# Patient Record
Sex: FEMALE | NOT HISPANIC OR LATINO | Employment: FULL TIME | URBAN - METROPOLITAN AREA
[De-identification: names, ages, dates, MRNs, and addresses within clinical notes are randomized per-mention and may not be internally consistent; named-entity substitution may affect disease eponyms.]

---

## 2017-01-05 ENCOUNTER — TELEPHONE (OUTPATIENT)
Dept: FAMILY MEDICINE | Facility: OTHER | Age: 27
End: 2017-01-05

## 2017-01-05 NOTE — TELEPHONE ENCOUNTER
Summary:    Patient is due/failing the following:   PAP and PHQ9    Action needed:   Patient needs office visit for PAP. and Patient needs to do PHQ9.    Type of outreach:    Phone, left message for patient to call back.     Questions for provider review:    None                                                                                                                                    Victorina Cuellar       Chart routed to Care Team .        Panel Management Review      Patient has the following on her problem list: None      Composite cancer screening  Chart review shows that this patient is due/due soon for the following Pap Smear

## 2017-01-05 NOTE — Clinical Note
[facility]  [address]   [facility] [address]  Phone: [phone]  January 6, 2017      Vin Branham  [address]  [place] [address]      Dear Vin,    We care about your health and have reviewed your health plan including your medical conditions, medications, and lab results.  Based on this review, it is recommended that you follow up regarding the following health topic(s):  -Cervical Cancer Screening    We recommend you take the following action(s):  -schedule a PAP SMEAR EXAM which is due.  Please disregard this reminder if you have had this exam elsewhere within the last year.  It would be helpful for us to have a copy of your recent pap smear report to update your records.     Please call us at the [facility] - [phone] (or use Yippy) to address the above recommendations.     Thank you for trusting [facility] and we appreciate the opportunity to serve you.  We look forward to supporting your healthcare needs in the future.    Healthy Regards,    Your Health Care Team  [facility]
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
## 2018-07-06 NOTE — PATIENT INSTRUCTIONS
Preventive Health Recommendations  Female Ages 26 - 39  Yearly exam:   See your health care provider every year in order to    Review health changes.     Discuss preventive care.      Review your medicines if you your doctor has prescribed any.    Until age 30: Get a Pap test every three years (more often if you have had an abnormal result).    After age 30: Talk to your doctor about whether you should have a Pap test every 3 years or have a Pap test with HPV screening every 5 years.   You do not need a Pap test if your uterus was removed (hysterectomy) and you have not had cancer.  You should be tested each year for STDs (sexually transmitted diseases), if you're at risk.   Talk to your provider about how often to have your cholesterol checked.  If you are at risk for diabetes, you should have a diabetes test (fasting glucose).  Shots: Get a flu shot each year. Get a tetanus shot every 10 years.   Nutrition:     Eat at least 5 servings of fruits and vegetables each day.    Eat whole-grain bread, whole-wheat pasta and brown rice instead of white grains and rice.    Get adequate Calcium and Vitamin D.     Lifestyle    Exercise at least 150 minutes a week (30 minutes a day, 5 days of the week). This will help you control your weight and prevent disease.    Limit alcohol to one drink per day.    No smoking.     Wear sunscreen to prevent skin cancer.    See your dentist every six months for an exam and cleaning.        Will get you set up with OB/gyn to further discuss your chronic pelvic pain with history of endometriosis.    Will order labs today including cholesterol. If anything comes back abnormal, will order genetic testing.    Follow up annually.

## 2018-07-11 ENCOUNTER — OFFICE VISIT (OUTPATIENT)
Dept: FAMILY MEDICINE | Facility: OTHER | Age: 28
End: 2018-07-11
Payer: COMMERCIAL

## 2018-07-11 VITALS
TEMPERATURE: 98.8 F | RESPIRATION RATE: 16 BRPM | WEIGHT: 157 LBS | BODY MASS INDEX: 24.64 KG/M2 | DIASTOLIC BLOOD PRESSURE: 64 MMHG | OXYGEN SATURATION: 100 % | SYSTOLIC BLOOD PRESSURE: 110 MMHG | HEIGHT: 67 IN | HEART RATE: 89 BPM

## 2018-07-11 DIAGNOSIS — N94.6 DYSMENORRHEA: ICD-10-CM

## 2018-07-11 DIAGNOSIS — Z00.00 ENCOUNTER FOR ROUTINE ADULT HEALTH EXAMINATION WITHOUT ABNORMAL FINDINGS: Primary | ICD-10-CM

## 2018-07-11 DIAGNOSIS — Z11.4 SCREENING FOR HIV (HUMAN IMMUNODEFICIENCY VIRUS): ICD-10-CM

## 2018-07-11 DIAGNOSIS — Z83.42 FAMILY HISTORY OF FAMILIAL HYPERCHOLESTEROLEMIA: ICD-10-CM

## 2018-07-11 DIAGNOSIS — Z13.6 CARDIOVASCULAR SCREENING; LDL GOAL LESS THAN 160: ICD-10-CM

## 2018-07-11 DIAGNOSIS — R10.2 CHRONIC PELVIC PAIN IN FEMALE: ICD-10-CM

## 2018-07-11 DIAGNOSIS — G89.29 CHRONIC PELVIC PAIN IN FEMALE: ICD-10-CM

## 2018-07-11 PROBLEM — Z23 NEED FOR PROPHYLACTIC VACCINATION WITH TETANUS-DIPHTHERIA (TD): Status: ACTIVE | Noted: 2018-07-11

## 2018-07-11 LAB
ANION GAP SERPL CALCULATED.3IONS-SCNC: 8 MMOL/L (ref 3–14)
BUN SERPL-MCNC: 13 MG/DL (ref 7–30)
CALCIUM SERPL-MCNC: 9.4 MG/DL (ref 8.5–10.1)
CHLORIDE SERPL-SCNC: 103 MMOL/L (ref 94–109)
CHOLEST SERPL-MCNC: 244 MG/DL
CO2 SERPL-SCNC: 27 MMOL/L (ref 20–32)
CREAT SERPL-MCNC: 0.84 MG/DL (ref 0.52–1.04)
GFR SERPL CREATININE-BSD FRML MDRD: 80 ML/MIN/1.7M2
GLUCOSE SERPL-MCNC: 88 MG/DL (ref 70–99)
HDLC SERPL-MCNC: 74 MG/DL
LDLC SERPL CALC-MCNC: 153 MG/DL
NONHDLC SERPL-MCNC: 170 MG/DL
POTASSIUM SERPL-SCNC: 4 MMOL/L (ref 3.4–5.3)
SODIUM SERPL-SCNC: 138 MMOL/L (ref 133–144)
TRIGL SERPL-MCNC: 84 MG/DL

## 2018-07-11 PROCEDURE — 99395 PREV VISIT EST AGE 18-39: CPT | Performed by: PHYSICIAN ASSISTANT

## 2018-07-11 PROCEDURE — 80061 LIPID PANEL: CPT | Performed by: PHYSICIAN ASSISTANT

## 2018-07-11 PROCEDURE — 99213 OFFICE O/P EST LOW 20 MIN: CPT | Mod: 25 | Performed by: PHYSICIAN ASSISTANT

## 2018-07-11 PROCEDURE — 87389 HIV-1 AG W/HIV-1&-2 AB AG IA: CPT | Performed by: PHYSICIAN ASSISTANT

## 2018-07-11 PROCEDURE — 36415 COLL VENOUS BLD VENIPUNCTURE: CPT | Performed by: PHYSICIAN ASSISTANT

## 2018-07-11 PROCEDURE — 80048 BASIC METABOLIC PNL TOTAL CA: CPT | Performed by: PHYSICIAN ASSISTANT

## 2018-07-11 RX ORDER — SPIRONOLACTONE 100 MG/1
100 TABLET, FILM COATED ORAL
COMMUNITY
Start: 2018-06-20 | End: 2019-08-26

## 2018-07-11 ASSESSMENT — ENCOUNTER SYMPTOMS
ABDOMINAL PAIN: 0
HEARTBURN: 0
CONSTIPATION: 0
DIARRHEA: 0
FREQUENCY: 1
MYALGIAS: 0
COUGH: 0
HEADACHES: 0
PALPITATIONS: 0
DIZZINESS: 0
NERVOUS/ANXIOUS: 1
BREAST MASS: 0
FEVER: 0
SHORTNESS OF BREATH: 0
HEMATOCHEZIA: 0
PARESTHESIAS: 0
ARTHRALGIAS: 0
SORE THROAT: 0
NAUSEA: 0
JOINT SWELLING: 0
CHILLS: 1
DYSURIA: 0
EYE PAIN: 0
HEMATURIA: 0
WEAKNESS: 0

## 2018-07-11 ASSESSMENT — PATIENT HEALTH QUESTIONNAIRE - PHQ9
10. IF YOU CHECKED OFF ANY PROBLEMS, HOW DIFFICULT HAVE THESE PROBLEMS MADE IT FOR YOU TO DO YOUR WORK, TAKE CARE OF THINGS AT HOME, OR GET ALONG WITH OTHER PEOPLE: NOT DIFFICULT AT ALL
SUM OF ALL RESPONSES TO PHQ QUESTIONS 1-9: 5
SUM OF ALL RESPONSES TO PHQ QUESTIONS 1-9: 5

## 2018-07-11 ASSESSMENT — PAIN SCALES - GENERAL: PAINLEVEL: NO PAIN (0)

## 2018-07-11 ASSESSMENT — ANXIETY QUESTIONNAIRES
1. FEELING NERVOUS, ANXIOUS, OR ON EDGE: SEVERAL DAYS
3. WORRYING TOO MUCH ABOUT DIFFERENT THINGS: SEVERAL DAYS
5. BEING SO RESTLESS THAT IT IS HARD TO SIT STILL: SEVERAL DAYS
7. FEELING AFRAID AS IF SOMETHING AWFUL MIGHT HAPPEN: SEVERAL DAYS
2. NOT BEING ABLE TO STOP OR CONTROL WORRYING: SEVERAL DAYS
4. TROUBLE RELAXING: SEVERAL DAYS
GAD7 TOTAL SCORE: 7
6. BECOMING EASILY ANNOYED OR IRRITABLE: SEVERAL DAYS
7. FEELING AFRAID AS IF SOMETHING AWFUL MIGHT HAPPEN: SEVERAL DAYS
GAD7 TOTAL SCORE: 7
GAD7 TOTAL SCORE: 7

## 2018-07-11 NOTE — MR AVS SNAPSHOT
After Visit Summary   7/11/2018    Vin Branham    MRN: [id number]           Patient Information     Date Of Birth          1990        Visit Information        Provider Department      7/11/2018 7:30 AM Daniele Garcia PA-C [facility]        Today's Diagnoses     Encounter for routine adult health examination without abnormal findings    -  1    Screening for HIV (human immunodeficiency virus)        Chronic pelvic pain in female        Endometriosis of pelvis        Family history of familial hypercholesterolemia        CARDIOVASCULAR SCREENING; LDL GOAL LESS THAN 160        Need for prophylactic vaccination with tetanus-diphtheria (TD)          Care Instructions      Preventive Health Recommendations  Female Ages 26 - 39  Yearly exam:   See your health care provider every year in order to    Review health changes.     Discuss preventive care.      Review your medicines if you your doctor has prescribed any.    Until age 30: Get a Pap test every three years (more often if you have had an abnormal result).    After age 30: Talk to your doctor about whether you should have a Pap test every 3 years or have a Pap test with HPV screening every 5 years.   You do not need a Pap test if your uterus was removed (hysterectomy) and you have not had cancer.  You should be tested each year for STDs (sexually transmitted diseases), if you're at risk.   Talk to your provider about how often to have your cholesterol checked.  If you are at risk for diabetes, you should have a diabetes test (fasting glucose).  Shots: Get a flu shot each year. Get a tetanus shot every 10 years.   Nutrition:     Eat at least 5 servings of fruits and vegetables each day.    Eat whole-grain bread, whole-wheat pasta and brown rice instead of white grains and rice.    Get adequate Calcium and Vitamin D.     Lifestyle    Exercise at least 150 minutes a week (30 minutes a day, 5 days of the week). This will  help you control your weight and prevent disease.    Limit alcohol to one drink per day.    No smoking.     Wear sunscreen to prevent skin cancer.    See your dentist every six months for an exam and cleaning.        Will get you set up with OB/gyn to further discuss your chronic pelvic pain with history of endometriosis.    Will order labs today including cholesterol. If anything comes back abnormal, will order genetic testing.    Follow up annually.                  Follow-ups after your visit        Additional Services     OB/GYN REFERRAL       Your provider has referred you to:  [facility]: [facility] [place] [phone]   [email or web address]    Please be aware that coverage of these services is subject to the terms and limitations of your health insurance plan.  Call member services at your health plan with any benefit or coverage questions.      Please bring the following with you to your appointment:    (1) Any X-Rays, CTs or MRIs which have been performed.  Contact the facility where they were done to arrange for  prior to your scheduled appointment.   (2) List of current medications   (3) This referral request   (4) Any documents/labs given to you for this referral                  Follow-up notes from your care team     Return in about 1 year (around 7/11/2019) for Routine Visit, Lab Work, Physical Exam.      Your next 10 appointments already scheduled     Aug 09, 2018  9:30 AM CDT   [facility]    [address] [address] [phone]           Bring a current list of meds and any records pertaining to this visit. For Physicals, please bring immunization records and any forms needing to be filled out. Please arrive 10 minutes early to complete paperwork.              Who to contact     If you have questions or need follow up information about today's clinic  "visit or your schedule please contact [facility] directly at [phone].  Normal or non-critical lab and imaging results will be communicated to you by MyChart, letter or phone within 4 business days after the clinic has received the results. If you do not hear from us within 7 days, please contact the clinic through MyChart or phone. If you have a critical or abnormal lab result, we will notify you by phone as soon as possible.  Submit refill requests through [employer] or call your pharmacy and they will forward the refill request to us. Please allow 3 business days for your refill to be completed.          Additional Information About Your Visit        Care EveryWhere ID     This is your Care EveryWhere ID. This could be used by other organizations to access your [place] medical records  [id number]        Your Vitals Were     Pulse Temperature Respirations Height Last Period Pulse Oximetry    89 98.8  F (37.1  C) (Temporal) 16 5' 7\" (1.702 m) (LMP Unknown) 100%    BMI (Body Mass Index)                   24.59 kg/m2            Blood Pressure from Last 3 Encounters:   07/11/18 110/64   02/01/16 108/70   04/03/15 118/82    Weight from Last 3 Encounters:   07/11/18 157 lb (71.2 kg)   02/01/16 198 lb (89.8 kg)   04/03/15 199 lb (90.3 kg)              We Performed the Following     Basic metabolic panel  (Ca, Cl, CO2, Creat, Gluc, K, Na, BUN)     HIV Screening     Lipid panel reflex to direct LDL Fasting     OB/GYN REFERRAL     TDAP, IM (10 - 64 YRS) - Adacel        Primary Care Provider Office Phone # Fax #    [facility] [phone]       [address] [address]        Equal Access to Services     CHENTE MCCLURE AH: Hadii alex Rivera, waanjumda luqadaha, qaybta kaalmada adeegyada, gill damian. So [facility] [phone].    ATENCIÓN: Si habla español, tiene a nunez disposición servicios gratuitos de asistencia lingüística. Llame al " [phone].    We comply with applicable federal civil rights laws and [place] laws. We do not discriminate on the basis of race, color, national origin, age, disability, sex, sexual orientation, or gender identity.            Thank you!     Thank you for choosing [facility]  for your care. Our goal is always to provide you with excellent care. Hearing back from our patients is one way we can continue to improve our services. Please take a few minutes to complete the written survey that you may receive in the mail after your visit with us. Thank you!             Your Updated Medication List - Protect others around you: Learn how to safely use, store and throw away your medicines at www.disposemymeds.org.          This list is accurate as of 7/11/18  8:00 AM.  Always use your most recent med list.                   Brand Name Dispense Instructions for use Diagnosis    ibuprofen 600 MG tablet    ADVIL/MOTRIN    90 tablet    Take 1 tablet by mouth every 6 hours as needed for pain.    Ovarian cysts, Chronic pelvic pain in female, Endometriosis of pelvis       ondansetron 4 MG ODT tab    ZOFRAN ODT    20 tablet    Take 1-2 tablets by mouth every 8 hours as needed for nausea.    Nausea, Abdominal pain, Diarrhea       oxyCODONE-acetaminophen 5-325 MG per tablet    PERCOCET          spironolactone 100 MG tablet    ALDACTONE     100 mg        TYLENOL PO      Take  by mouth.

## 2018-07-11 NOTE — NURSING NOTE
"Chief Complaint   Patient presents with     Physical     Panel Management     mychart, tdap, height, pap, hiv screen , phq/radha, urine drug screen        Initial /64  Pulse 89  Temp 98.8  F (37.1  C) (Temporal)  Resp 16  Ht 5' 7\" (1.702 m)  Wt 157 lb (71.2 kg)  LMP  (LMP Unknown)  SpO2 100%  BMI 24.59 kg/m2 Estimated body mass index is 24.59 kg/(m^2) as calculated from the following:    Height as of this encounter: 5' 7\" (1.702 m).    Weight as of this encounter: 157 lb (71.2 kg).  Medication Reconciliation: complete    Tierney Gee CMA      "

## 2018-07-12 LAB — HIV 1+2 AB+HIV1 P24 AG SERPL QL IA: NONREACTIVE

## 2018-07-12 ASSESSMENT — PATIENT HEALTH QUESTIONNAIRE - PHQ9: SUM OF ALL RESPONSES TO PHQ QUESTIONS 1-9: 5

## 2018-07-12 ASSESSMENT — ANXIETY QUESTIONNAIRES: GAD7 TOTAL SCORE: 7

## 2018-07-23 ENCOUNTER — TELEPHONE (OUTPATIENT)
Dept: FAMILY MEDICINE | Facility: OTHER | Age: 28
End: 2018-07-23

## 2018-07-23 ENCOUNTER — ALLIED HEALTH/NURSE VISIT (OUTPATIENT)
Dept: FAMILY MEDICINE | Facility: OTHER | Age: 28
End: 2018-07-23
Payer: COMMERCIAL

## 2018-07-23 DIAGNOSIS — E78.00 PURE HYPERCHOLESTEROLEMIA: ICD-10-CM

## 2018-07-23 DIAGNOSIS — Z12.4 SCREENING FOR MALIGNANT NEOPLASM OF CERVIX: ICD-10-CM

## 2018-07-23 DIAGNOSIS — Z83.42 FAMILY HISTORY OF FAMILIAL HYPERCHOLESTEROLEMIA: ICD-10-CM

## 2018-07-23 DIAGNOSIS — Z23 NEED FOR PROPHYLACTIC VACCINATION WITH TETANUS-DIPHTHERIA (TD): ICD-10-CM

## 2018-07-23 DIAGNOSIS — Z23 NEED FOR TDAP VACCINATION: ICD-10-CM

## 2018-07-23 DIAGNOSIS — Z83.42 FAMILY HISTORY OF FAMILIAL HYPERCHOLESTEROLEMIA: Primary | ICD-10-CM

## 2018-07-23 PROCEDURE — 90471 IMMUNIZATION ADMIN: CPT

## 2018-07-23 PROCEDURE — 40000803 ZZHCL STATISTIC DNA ISOL HIGH PURITY: Performed by: PHYSICIAN ASSISTANT

## 2018-07-23 PROCEDURE — 36415 COLL VENOUS BLD VENIPUNCTURE: CPT | Performed by: PHYSICIAN ASSISTANT

## 2018-07-23 PROCEDURE — 99207 ZZC NO CHARGE LOS: CPT

## 2018-07-23 PROCEDURE — 90715 TDAP VACCINE 7 YRS/> IM: CPT

## 2018-07-23 PROCEDURE — 81479 UNLISTED MOLECULAR PATHOLOGY: CPT | Performed by: PHYSICIAN ASSISTANT

## 2018-07-23 PROCEDURE — 81406 MOPATH PROCEDURE LEVEL 7: CPT | Mod: 91 | Performed by: PHYSICIAN ASSISTANT

## 2018-07-23 PROCEDURE — 81401 MOPATH PROCEDURE LEVEL 2: CPT | Performed by: PHYSICIAN ASSISTANT

## 2018-07-23 NOTE — MR AVS SNAPSHOT
After Visit Summary   7/23/2018    Vin Branham    MRN: [id number]           Patient Information     Date Of Birth          1990        Visit Information        Provider Department      7/23/2018 3:15 PM ROSEMARY VILLALTA TEAM B, [facility]        Today's Diagnoses     Screening for malignant neoplasm of cervix        Need for prophylactic vaccination with tetanus-diphtheria (TD)        Need for Tdap vaccination           Follow-ups after your visit        Your next 10 appointments already scheduled     Aug 09, 2018  9:30 AM CDT   Office Visit [facility]h [facility] MD   [facility]    [address]  [facility] [address]   [phone]           Bring a current list of meds and any records pertaining to this visit. For Physicals, please bring immunization records and any forms needing to be filled out. Please arrive 10 minutes early to complete paperwork.              Future tests that were ordered for you today     Open Future Orders        Priority Expected Expires Ordered    [employer] Hold For Preauthorization: CUSTOM NGS; LDLR, PCSK9, apoB Routine 7/23/2018 7/23/2019 7/23/2018            Who to contact     If you have questions or need follow up information about today's clinic visit or your schedule please contact [facility] directly at [phone].  Normal or non-critical lab and imaging results will be communicated to you by MyChart, letter or phone within 4 business days after the clinic has received the results. If you do not hear from us within 7 days, please contact the clinic through MyChart or phone. If you have a critical or abnormal lab result, we will notify you by phone as soon as possible.  Submit refill requests through [employer] or call your pharmacy and they will forward the refill request to us. Please allow 3 business days for your refill to be completed.          Additional  Information About Your Visit        Care EveryWhere ID     This is your Care EveryWhere ID. This could be used by other organizations to access your [place] medical records  [id number]        Your Vitals Were     Last Period                   (LMP Unknown)            Blood Pressure from Last 3 Encounters:   07/11/18 110/64   02/01/16 108/70   04/03/15 118/82    Weight from Last 3 Encounters:   07/11/18 157 lb (71.2 kg)   02/01/16 198 lb (89.8 kg)   04/03/15 199 lb (90.3 kg)              We Performed the Following     INJECTION INTRAMUSCULAR OR SUB-Q     TDAP, IM (10 - 64 YRS) - Adacel        Primary Care Provider Office Phone # Fax #    [facility] [phone]       [address] [place] [address]        Equal Access to Services     SHERINE MCCLURE : Hadii aad ku hadasho Sonuzhat, waaxda luqadaha, qaybta kaalmada adedee deeyarommel, gill paz . So [facility] [phone].    ATENCIÓN: Si habla español, tiene a nunez disposición servicios gratuitos de asistencia lingüística. Freddie al [phone].    We comply with applicable federal civil rights laws and [place] laws. We do not discriminate on the basis of race, color, national origin, age, disability, sex, sexual orientation, or gender identity.            Thank you!     Thank you for choosing [facility]  for your care. Our goal is always to provide you with excellent care. Hearing back from our patients is one way we can continue to improve our services. Please take a few minutes to complete the written survey that you may receive in the mail after your visit with us. Thank you!             Your Updated Medication List - Protect others around you: Learn how to safely use, store and throw away your medicines at www.disposemymeds.org.          This list is accurate as of 7/23/18  3:23 PM.  Always use your most recent med list.                   Brand Name Dispense Instructions for use Diagnosis     ibuprofen 600 MG tablet    ADVIL/MOTRIN    90 tablet    Take 1 tablet by mouth every 6 hours as needed for pain.    Ovarian cysts, Chronic pelvic pain in female, Endometriosis of pelvis       oxyCODONE-acetaminophen 5-325 MG per tablet    PERCOCET          spironolactone 100 MG tablet    ALDACTONE     100 mg        TYLENOL PO      Take  by mouth.

## 2018-07-23 NOTE — NURSING NOTE
Screening Questionnaire for Adult Immunization    Are you sick today?   No   Do you have allergies to medications, food, a vaccine component or latex?   No   Have you ever had a serious reaction after receiving a vaccination?   No   Do you have a long-term health problem with heart disease, lung disease, asthma, kidney disease, metabolic disease (e.g. diabetes), anemia, or other blood disorder?   No   Do you have cancer, leukemia, HIV/AIDS, or any other immune system problem?   No   In the past 3 months, have you taken medications that affect  your immune system, such as prednisone, other steroids, or anticancer drugs; drugs for the treatment of rheumatoid arthritis, Crohn s disease, or psoriasis; or have you had radiation treatments?   No   Have you had a seizure, or a brain or other nervous system problem?   No   During the past year, have you received a transfusion of blood or blood     products, or been given immune (gamma) globulin or antiviral drug?   No   For women: Are you pregnant or is there a chance you could become        pregnant during the next month?   No   Have you received any vaccinations in the past 4 weeks?   No     Immunization questionnaire answers were all negative.        Per orders of David Garcia NP, injection of TDAP given by Amanda Ambrocio. Patient instructed to remain in clinic for 15 minutes afterwards, and to report any adverse reaction to me immediately.  Prior to injection verified patient identity using patient's name and date of birth.  Due to injection administration, patient instructed to remain in clinic for 15 minutes  afterwards, and to report any adverse reaction to me immediately.     Screening performed by Amanda Ambrocio on 7/23/2018 at 3:22 PM.

## 2018-07-23 NOTE — TELEPHONE ENCOUNTER
I called and notified patient about the pricing for the genetic hyperlipidemia testing and that it is around $500 per gene and we will need to test 3 different genes. The [facility] testing panel is more extensive and is around $2500. She spoke with her insurance and they told her they would cover 80% of the cost so she would like to move forward with the testing through [facility]. I notified her that she will need to sign a genetic consent form when she comes in. She will come in today for the lab work and will also get her Tdap that she was supposed to get at her recent visit.    Daniele Garcia PA-C

## 2018-07-24 LAB — COPATH REPORT: NORMAL

## 2018-07-27 DIAGNOSIS — E78.00 PURE HYPERCHOLESTEROLEMIA: ICD-10-CM

## 2018-07-27 DIAGNOSIS — Z83.42 FAMILY HISTORY OF FAMILIAL HYPERCHOLESTEROLEMIA: ICD-10-CM

## 2018-08-09 ENCOUNTER — RADIANT APPOINTMENT (OUTPATIENT)
Dept: ULTRASOUND IMAGING | Facility: OTHER | Age: 28
End: 2018-08-09
Attending: OBSTETRICS & GYNECOLOGY
Payer: COMMERCIAL

## 2018-08-09 ENCOUNTER — OFFICE VISIT (OUTPATIENT)
Dept: OBGYN | Facility: OTHER | Age: 28
End: 2018-08-09
Attending: PHYSICIAN ASSISTANT
Payer: COMMERCIAL

## 2018-08-09 VITALS
HEART RATE: 68 BPM | WEIGHT: 162 LBS | BODY MASS INDEX: 25.37 KG/M2 | SYSTOLIC BLOOD PRESSURE: 110 MMHG | DIASTOLIC BLOOD PRESSURE: 64 MMHG

## 2018-08-09 DIAGNOSIS — Z12.4 SCREENING FOR MALIGNANT NEOPLASM OF CERVIX: ICD-10-CM

## 2018-08-09 DIAGNOSIS — G89.29 CHRONIC PELVIC PAIN IN FEMALE: ICD-10-CM

## 2018-08-09 DIAGNOSIS — R10.2 CHRONIC PELVIC PAIN IN FEMALE: ICD-10-CM

## 2018-08-09 DIAGNOSIS — Z97.5 IUD (INTRAUTERINE DEVICE) IN PLACE: ICD-10-CM

## 2018-08-09 PROCEDURE — 99203 OFFICE O/P NEW LOW 30 MIN: CPT | Performed by: OBSTETRICS & GYNECOLOGY

## 2018-08-09 PROCEDURE — 87624 HPV HI-RISK TYP POOLED RSLT: CPT | Performed by: OBSTETRICS & GYNECOLOGY

## 2018-08-09 PROCEDURE — 76830 TRANSVAGINAL US NON-OB: CPT

## 2018-08-09 PROCEDURE — G0476 HPV COMBO ASSAY CA SCREEN: HCPCS | Performed by: OBSTETRICS & GYNECOLOGY

## 2018-08-09 PROCEDURE — 76856 US EXAM PELVIC COMPLETE: CPT

## 2018-08-09 PROCEDURE — G0145 SCR C/V CYTO,THINLAYER,RESCR: HCPCS | Performed by: OBSTETRICS & GYNECOLOGY

## 2018-08-09 NOTE — LETTER
August 17, 2018    Vin Jorge Branham  [address] [place] [address]    Dear Vin,  We are happy to inform you that your PAP smear result from 8/9/18 is normal.  We are now able to do a follow up test on PAP smears. The DNA test is for HPV (Human Papilloma Virus). Cervical cancer is closely linked with certain types of HPV. Your results showed no evidence of high risk HPV.  Therefore we recommend you return in 5 years for your next pap smear and HPV test.  You will still need to return to the clinic every year for an annual exam and other preventive tests.  Please contact the clinic at [phone] with any questions.  Sincerely,    Sariah Lazo MD/sushma

## 2018-08-09 NOTE — NURSING NOTE
"Chief Complaint   Patient presents with     Gyn Exam     pap      Consult     discuss continue care for endometriosis/PCOS       Initial /64 (BP Location: Left arm, Patient Position: Chair, Cuff Size: Adult Regular)  Pulse 68  Wt 162 lb (73.5 kg)  LMP 2018 (Approximate)  BMI 25.37 kg/m2 Estimated body mass index is 25.37 kg/(m^2) as calculated from the following:    Height as of 18: 5' 7\" (1.702 m).    Weight as of this encounter: 162 lb (73.5 kg).  BP completed using cuff size: regular        The following HM Due: pap smear      The following patient reported/Care Every where data was sent to:  P ABSTRACT QUALITY INITIATIVES [08476]       patient has appointment for today    Thania Ley CMA  2018           "

## 2018-08-09 NOTE — MR AVS SNAPSHOT
After Visit Summary   8/9/2018    Vin Branham    MRN: [id number]           Patient Information     Date Of Birth          1990        Visit Information        Provider Department      8/9/2018 9:30 AM Sariah Lazo MD [facility]        Today's Diagnoses     Endometriosis of pelvis    -  1    Chronic pelvic pain in female        Screening for malignant neoplasm of cervix        IUD (intrauterine device) in place           Follow-ups after your visit        Follow-up notes from your care team     Return if symptoms worsen or fail to improve.      Who to contact     If you have questions or need follow up information about today's clinic visit or your schedule please contact [facility] directly at [phone].  Normal or non-critical lab and imaging results will be communicated to you by MyChart, letter or phone within 4 business days after the clinic has received the results. If you do not hear from us within 7 days, please contact the clinic through MyChart or phone. If you have a critical or abnormal lab result, we will notify you by phone as soon as possible.  Submit refill requests through [employer] or call your pharmacy and they will forward the refill request to us. Please allow 3 business days for your refill to be completed.          Additional Information About Your Visit        Care EveryWhere ID     This is your Care EveryWhere ID. This could be used by other organizations to access your [place] medical records  [id number]        Your Vitals Were     Pulse Last Period BMI (Body Mass Index)             68 07/26/2018 (Approximate) 25.37 kg/m2          Blood Pressure from Last 3 Encounters:   08/09/18 110/64   07/11/18 110/64   02/01/16 108/70    Weight from Last 3 Encounters:   08/09/18 162 lb (73.5 kg)   07/11/18 157 lb (71.2 kg)   02/01/16 198 lb (89.8 kg)              We Performed the Following     HPV High Risk Types DNA Cervical     Pap  imaged thin layer screen with HPV - recommended age 30 - 65 years (select HPV order below)        Primary Care Provider Office Phone # Fax #    [facility] [phone]       [address] [place] [address]        Equal Access to Services     CHENTE MCCLURE : Hadii aad ku hademmyjr Sofrancyali, waaxda luqadaha, qaybta kaalmada adeegyada, gill duque haytova higuerasierracathy damian. So [facility] [phone].    ATENCIÓN: Si habla español, tiene a nunez disposición servicios gratuitos de asistencia lingüística. Llame al [phone].    We comply with applicable federal civil rights laws and [place] laws. We do not discriminate on the basis of race, color, national origin, age, disability, sex, sexual orientation, or gender identity.            Thank you!     Thank you for choosing [facility]  for your care. Our goal is always to provide you with excellent care. Hearing back from our patients is one way we can continue to improve our services. Please take a few minutes to complete the written survey that you may receive in the mail after your visit with us. Thank you!             Your Updated Medication List - Protect others around you: Learn how to safely use, store and throw away your medicines at www.disposemymeds.org.          This list is accurate as of 8/9/18 10:50 AM.  Always use your most recent med list.                   Brand Name Dispense Instructions for use Diagnosis    ibuprofen 600 MG tablet    ADVIL/MOTRIN    90 tablet    Take 1 tablet by mouth every 6 hours as needed for pain.    Ovarian cysts, Chronic pelvic pain in female, Endometriosis of pelvis       levonorgestrel 20 MCG/24HR IUD    MIRENA     1 each by Intrauterine route once        oxyCODONE-acetaminophen 5-325 MG per tablet    PERCOCET          spironolactone 100 MG tablet    ALDACTONE     100 mg        TYLENOL PO      Take  by mouth.

## 2018-08-09 NOTE — PROGRESS NOTES
OB/GYN New   2018      NAME:  Vin Branham  PCP:  [facility], [facility]  MRN:  [id number]      Impression / Plan     28 year old  with:      ICD-10-CM    1. Endometriosis of pelvis N80.3 US Pelvic Complete with Transvaginal   2. Chronic pelvic pain in female R10.2 US Pelvic Complete with Transvaginal    G89.29    3. Screening for malignant neoplasm of cervix Z12.4 Pap imaged thin layer screen with HPV - recommended age 30 - 65 years (select HPV order below)     HPV High Risk Types DNA Cervical       Discussed management options for her chronic pelvic pain and endometriosis.  She has already done 6 months of Depo-Lupron and now has the Mirena IUD in place.  We will start with an ultrasound and I will contact her with those results and we will discuss management options in more detail at that time.    Chief Complaint     Chief Complaint   Patient presents with     Gyn Exam     pap      Consult     discuss continue care for endometriosis/PCOS       HPI     Vin Branham is a  28 year old female who is seen for consultation.     Patient has been seen at [facility].  Dr. Tolliver's operative note was reviewed.  He performed a robotic assisted operative laparoscopy with resection of endometriosis on 2013.  Endometriosis lesions were noted on the left uterosacral ligament and also the right ovarian fossa.  The fallopian tubes appeared grossly normal bilaterally.  He took 5 samples of the peritoneum and all returned benign fibrovascular tissue.     An had IUD insertion on 2014 with Dr. Tolliver at [facility].  Notes available for review in the media section of epic.  Uterus sounded to 7 cm and the Mirena IUD was inserted without difficulty.    Patient also had pelvic floor physical therapy through [facility].  She has not had physical therapy in the last 2 years.  She initially noticed some improvement but no significant improvements in the long-term.    Patient last saw  Randal NAVAS in 2013.  Dr. Agbeh's note was reviewed, please see his note    She has been treated with Depo Lupron in the past.  Dr. Agbeh discussed hysterectomy with BSO as definitive surgery, but discussed the risks of surgical menopause as well.    Patient's last menstrual period was 07/26/2018 (approximate).     Patient states she continues to have pelvic pain.  Usually her pain is on the right.  Today however her pain is on the left.   She did not like the narcotics, which were given to her for management of the pelvic pain.  She has not seen Gyn in a couple of years.  Multiple medical therapies have not worked.      Date of Last Pap Smear:   Lab Results   Component Value Date    PAP NIL 04/15/2013         Problem List     Patient Active Problem List    Diagnosis Date Noted     Family history of familial hypercholesterolemia 07/11/2018     Priority: Medium     CARDIOVASCULAR SCREENING; LDL GOAL LESS THAN 160 02/11/2015     Priority: Medium     Chronic pelvic pain in female 02/26/2013     Priority: Medium     Ovarian cysts 02/26/2013     Priority: Medium     Problem list name updated by automated process. Provider to review and confirm       Endometriosis of pelvis 02/26/2013     Priority: Medium       Medications     Current Outpatient Prescriptions   Medication     Acetaminophen (TYLENOL PO)     levonorgestrel (MIRENA) 20 MCG/24HR IUD     spironolactone (ALDACTONE) 100 MG tablet     ibuprofen (ADVIL,MOTRIN) 600 MG tablet     oxyCODONE-acetaminophen (PERCOCET) 5-325 MG per tablet     No current facility-administered medications for this visit.         Allergies     Allergies   Allergen Reactions     Penicillins        Past Medical/Surgical History     Past Medical History:   Diagnosis Date     Asthma     Only as a child     Endometriosis        Past Surgical History:   Procedure Laterality Date     APPENDECTOMY  2010     ESOPHAGOSCOPY, GASTROSCOPY, DUODENOSCOPY (EGD), COMBINED  4/16/2013    Procedure:  COMBINED ESOPHAGOSCOPY, GASTROSCOPY, DUODENOSCOPY (EGD);  EGD  Nausea   pkt given in clinic  [facility];  Surgeon: Oliver Del Cid MD;  Location: [facility] OR     GYN SURGERY  2011, 2012    cystectomy        Social History     Social History     Social History     Marital status:      Spouse name: N/A     Number of children: N/A     Years of education: N/A     Occupational History     Not on file.     Social History Main Topics     Smoking status: Former Smoker     Types: Cigarettes     Smokeless tobacco: Never Used     Alcohol use Yes     Drug use: No     Sexual activity: Yes     Partners: Male     Other Topics Concern     Parent/Sibling W/ Cabg, Mi Or Angioplasty Before 65f 55m? No     Social History Narrative       Family History      History reviewed. No pertinent family history.    ROS     A 6 organ review of systems was asked and the pertinent positives and negatives are listed in the HPI. All other organ systems can be considered negative.     Physical Exam   Vitals: /64 (BP Location: Left arm, Patient Position: Chair, Cuff Size: Adult Regular)  Pulse 68  Wt 162 lb (73.5 kg)  LMP 07/26/2018 (Approximate)  BMI 25.37 kg/m2    General: Comfortable, no obvious distress, normal  body habitus  Psych: Alert and orientated x 3. Appropriate affect, good insight.   : Normal female external genitalia.  No lesions.  Urethral meatus normal.  Speculum exam reveals a normal vaginal vault, normal cervix. Small amount of menstrual blood.   Bimanual exam reveals a normal, mobile, uterus.  No cervical motion tenderness.  Adnexa with no palpable masses.  Pain in the left adnexa.  She is generally painful with the exam.  Some levator tenderness, but no significant spasm.   Mild bladder base tenderness.    Extremities: No peripheral edema, nontender     Pap was done in the usual fashion     Labs/Imaging       Labs were reviewed in [facility]     Imaging was reviewed in Epic.       30 minutes was spent with patient, more than  50% counseling and coordinating care      Sariah Lazo MD

## 2018-08-10 ENCOUNTER — ALLIED HEALTH/NURSE VISIT (OUTPATIENT)
Dept: FAMILY MEDICINE | Facility: OTHER | Age: 28
End: 2018-08-10
Payer: COMMERCIAL

## 2018-08-10 ENCOUNTER — TELEPHONE (OUTPATIENT)
Dept: OBGYN | Facility: OTHER | Age: 28
End: 2018-08-10

## 2018-08-10 VITALS — SYSTOLIC BLOOD PRESSURE: 114 MMHG | DIASTOLIC BLOOD PRESSURE: 70 MMHG | HEART RATE: 88 BPM

## 2018-08-10 DIAGNOSIS — N80.9 ENDOMETRIOSIS: Primary | ICD-10-CM

## 2018-08-10 PROCEDURE — 99207 ZZC NO CHARGE NURSE ONLY: CPT

## 2018-08-10 PROCEDURE — 96372 THER/PROPH/DIAG INJ SC/IM: CPT

## 2018-08-10 RX ORDER — MEDROXYPROGESTERONE ACETATE 150 MG/ML
150 INJECTION, SUSPENSION INTRAMUSCULAR
Qty: 3 ML | Refills: 3 | OUTPATIENT
Start: 2018-08-10 | End: 2019-08-26

## 2018-08-10 NOTE — MR AVS SNAPSHOT
After Visit Summary   8/10/2018    Vin Branham    MRN: [id number]           Patient Information     Date Of Birth          1990        Visit Information        Provider Department      8/10/2018 2:30 PM ROSEMARY VILLALTA TEAM B, [facility]        Today's Diagnoses     Contraception    -  1       Follow-ups after your visit        Who to contact     If you have questions or need follow up information about today's clinic visit or your schedule please contact [facility] directly at [phone].  Normal or non-critical lab and imaging results will be communicated to you by MyChart, letter or phone within 4 business days after the clinic has received the results. If you do not hear from us within 7 days, please contact the clinic through MyChart or phone. If you have a critical or abnormal lab result, we will notify you by phone as soon as possible.  Submit refill requests through [employer] or call your pharmacy and they will forward the refill request to us. Please allow 3 business days for your refill to be completed.          Additional Information About Your Visit        Care EveryWhere ID     This is your Care EveryWhere ID. This could be used by other organizations to access your [place] medical records  [id number]        Your Vitals Were     Pulse Last Period                88 07/26/2018 (Approximate)           Blood Pressure from Last 3 Encounters:   08/10/18 114/70   08/09/18 110/64   07/11/18 110/64    Weight from Last 3 Encounters:   08/09/18 162 lb (73.5 kg)   07/11/18 157 lb (71.2 kg)   02/01/16 198 lb (89.8 kg)              We Performed the Following     INJECTION INTRAMUSCULAR OR SUB-Q     Medroxyprogesterone inj/1mg (Depo Provera J-Code)          Today's Medication Changes          These changes are accurate as of 8/10/18  3:23 PM.  If you have any questions, ask your nurse or doctor.               Start taking these medicines.        Dose/Directions     medroxyPROGESTERone 150 MG/ML injection   Commonly known as:  DEPO-PROVERA   Used for:  Endometriosis   Started by:  Sariah Lazo MD        Dose:  150 mg   Inject 1 mL (150 mg) into the muscle every 3 months   Quantity:  3 mL   Refills:  3         Stop taking these medicines if you haven't already. Please contact your care team if you have questions.     oxyCODONE-acetaminophen 5-325 MG per tablet   Commonly known as:  PERCOCET   Stopped by:  Sariah Lazo MD                Where to get your medicines      Some of these will need a paper prescription and others can be bought over the counter.  Ask your nurse if you have questions.     You don't need a prescription for these medications     medroxyPROGESTERone 150 MG/ML injection                Primary Care Provider Office Phone # Fax #    [facility] [phone]       [address] [place] [address]        Equal Access to Services     [facility] : Hadii alex christineo Sonuzhat, waaxda luqadaha, qaybta kaalmada adedee deeyada, gill paz . So [facility] [phone].    ATENCIÓN: Si habla español, tiene a nunez disposición servicios gratuitos de asistencia lingüística. Llame al [phone].    We comply with applicable federal civil rights laws and [place] laws. We do not discriminate on the basis of race, color, national origin, age, disability, sex, sexual orientation, or gender identity.            Thank you!     Thank you for choosing [facility]  for your care. Our goal is always to provide you with excellent care. Hearing back from our patients is one way we can continue to improve our services. Please take a few minutes to complete the written survey that you may receive in the mail after your visit with us. Thank you!             Your Updated Medication List - Protect others around you: Learn how to safely use, store and throw away your medicines at www.disposemymeds.org.           This list is accurate as of 8/10/18  3:23 PM.  Always use your most recent med list.                   Brand Name Dispense Instructions for use Diagnosis    ibuprofen 600 MG tablet    ADVIL/MOTRIN    90 tablet    Take 1 tablet by mouth every 6 hours as needed for pain.    Ovarian cysts, Chronic pelvic pain in female, Endometriosis of pelvis       levonorgestrel 20 MCG/24HR IUD    MIRENA     1 each by Intrauterine route once        medroxyPROGESTERone 150 MG/ML injection    DEPO-PROVERA    3 mL    Inject 1 mL (150 mg) into the muscle every 3 months    Endometriosis       spironolactone 100 MG tablet    ALDACTONE     100 mg        TYLENOL PO      Take  by mouth.

## 2018-08-10 NOTE — NURSING NOTE
BP: 114/70    LAST PAP/EXAM:   Lab Results   Component Value Date    PAP NIL 04/15/2013     URINE HCG: not indicated    The following medication was given:     MEDICATION: Depo Provera 150mg  ROUTE: IM  SITE: Ventrogluteal - Right  : [employer]  LOT #: [id number]  EXP: 06/2020  NEXT INJECTION DUE: 10/26/18 - 11/9/18   Provider: Sariah Lazo MD    Prior to injection verified patient identity using patient's name and date of birth.  Due to injection administration, patient instructed to remain in clinic for 15 minutes  afterwards, and to report any adverse reaction to me immediately.  Roxana BHATTI CMA (AAMA)

## 2018-08-10 NOTE — TELEPHONE ENCOUNTER
Called to discuss ultrasound results and management options.    Ultrasound was normal with the IUD in the expected position.    Patient would like to try something new for her pelvic pain and endometriosis.  Please see my note from our recent office visit.   she has been on Depo-Lupron in the past.  This is her second Mirena IUD, which is due for removal in January (about 3 months).  This IUD has not resolved her symptoms to her satisfaction.  She does not feel the IUD worked any better when it was initially placed.  She has never been on the Depo-Provera injection as far as she can remember.  She has not done well with the oral contraceptive pills in the past.  She has tried Neurontin in the past and that has not helped.  She is not interested in narcotics.  She has not tried acupuncture but does not think her insurance will cover that.  She has tried pelvic floor physical therapy and that has not helped in the long-term.    We discussed medical management options.  Patient would like to try the Depo-Provera injection.  Discussed the risks and benefits of keeping the Mirena IUD in place during this initial trial.    Patient will call to schedule a nurse-only visit to have the Depo-Provera injection.  Plan to keep the Mirena in place at this time.    Please assist the patient in scheduling this appointment.  The Depo-Provera was ordered.  I would also like to see the patient in about 4-6 weeks for follow-up.

## 2018-08-13 LAB
COPATH REPORT: NORMAL
PAP: NORMAL

## 2018-08-13 NOTE — PROGRESS NOTES
SUBJECTIVE:   Vin Branham is a 28 year old female who presents to clinic today for the following health issues:      HPI     Headache  Onset: x 5 days    Description:   Location: bilateral in the temporal area, bilateral in the occipital area   Character: sharp pain  Frequency:  Never  Duration:      Intensity: moderate, 5/10    Progression of Symptoms:  worsening    Accompanying Signs & Symptoms:  Stiff neck: no  Neck or upper back pain: no  Fever: no  Sinus pressure: no  Nausea or vomiting: YES- nausea  Dizziness: no  Numbness: no  Weakness: no  Visual changes: no    History:   Head trauma: no  Family history of migraines: no  Previous tests for headaches: no  Neurologist evaluations: no  Able to do daily activities: YES- wears hat & sunglasses at work  Wake with a headaches: no  Do headaches wake you up: no  Daily pain medication use: YES- Tylenol  Work/school stressors/changes: no    Precipitating factors:   Does light make it worse: YES  Does sound make it worse: YES    Alleviating factors:  Does sleep help: YES  Therapies Tried and outcome: Tylenol      Problem list and histories reviewed & adjusted, as indicated.  Additional history: as documented        Patient Active Problem List   Diagnosis     Chronic pelvic pain in female     Ovarian cysts     Endometriosis of pelvis     Family history of familial hypercholesterolemia     IUD (intrauterine device) in place     Intractable episodic headache, unspecified headache type     Past Surgical History:   Procedure Laterality Date     APPENDECTOMY  2010     DAVINCI ASSISTED ABLATION / EXCISION OF ENDOMETRIOSIS  12/06/2013    Dr. oTlliver of [facility]     ESOPHAGOSCOPY, GASTROSCOPY, DUODENOSCOPY (EGD), COMBINED  4/16/2013    Procedure: COMBINED ESOPHAGOSCOPY, GASTROSCOPY, DUODENOSCOPY (EGD);  EGD  Nausea   pkt given in clinic  [facility];  Surgeon: Oliver Del Cid MD;  Location: [facility] OR     GYN SURGERY  2011, 2012    cystectomy      "  Social History   Substance Use Topics     Smoking status: Former Smoker     Types: Cigarettes     Smokeless tobacco: Never Used     Alcohol use Yes     History reviewed. No pertinent family history.      Current Outpatient Prescriptions   Medication Sig Dispense Refill     Acetaminophen (TYLENOL PO) Take  by mouth.       ibuprofen (ADVIL/MOTRIN) 800 MG tablet Take 1 tablet (800 mg) by mouth every 8 hours as needed for moderate pain 60 tablet 0     ketorolac (TORADOL) 60 MG/2ML SOLN injection Inject 2 mLs (60 mg) into the muscle once for 1 dose 2 mL 0     levonorgestrel (MIRENA) 20 MCG/24HR IUD 1 each by Intrauterine route once       medroxyPROGESTERone (DEPO-PROVERA) 150 MG/ML injection Inject 1 mL (150 mg) into the muscle every 3 months 3 mL 3     spironolactone (ALDACTONE) 100 MG tablet 100 mg       SUMAtriptan (IMITREX) 25 MG tablet Take 1-2 tablets (25-50 mg) by mouth at onset of headache for migraine May repeat in 2 hours. Max 8 tablets/24 hours. 18 tablet 1     ibuprofen (ADVIL,MOTRIN) 600 MG tablet Take 1 tablet by mouth every 6 hours as needed for pain. (Patient not taking: Reported on 7/11/2018) 90 tablet 3     Allergies   Allergen Reactions     Penicillins      BP Readings from Last 3 Encounters:   08/14/18 112/68   08/10/18 114/70   08/09/18 110/64    Wt Readings from Last 3 Encounters:   08/14/18 158 lb (71.7 kg)   08/09/18 162 lb (73.5 kg)   07/11/18 157 lb (71.2 kg)                  Labs reviewed in EPIC    ROS:  Constitutional, HEENT, cardiovascular, pulmonary, gi and gu systems are negative, except as otherwise noted.    OBJECTIVE:     /68 (BP Location: Right arm, Patient Position: Chair, Cuff Size: Adult Regular)  Pulse 89  Temp 97.4  F (36.3  C) (Temporal)  Resp 16  Ht 5' 7\" (1.702 m)  Wt 158 lb (71.7 kg)  LMP 07/26/2018 (Approximate)  SpO2 100%  BMI 24.75 kg/m2  Body mass index is 24.75 kg/(m^2).   Physical Exam   Constitutional: She is oriented to person, place, and time. She " appears well-developed and well-nourished.   HENT:   Head: Normocephalic and atraumatic.   Right Ear: External ear normal.   Left Ear: External ear normal.   Eyes: EOM are normal. Pupils are equal, round, and reactive to light.   Neck: Neck supple.   Cardiovascular: Normal rate, regular rhythm and intact distal pulses.    No murmur heard.  Pulmonary/Chest: Effort normal and breath sounds normal.   Abdominal: Soft. Bowel sounds are normal.   Musculoskeletal: Normal range of motion.   Neurological: She is alert and oriented to person, place, and time. She displays normal reflexes. No cranial nerve deficit. She exhibits normal muscle tone. Coordination normal.   Psychiatric: She has a normal mood and affect.         Diagnostic Test Results:  none     ASSESSMENT/PLAN:     Problem List Items Addressed This Visit     Intractable episodic headache, unspecified headache type - Primary     This is a pleasant 28-year-old with history of endometriosis who comes to the clinic complaining of acute onset of headaches 5 days ago about 2 hours after receiving her Depo-Provera treatment for endometriosis.  She does not usually have headaches.  She has experienced headaches in the past when she was put oral birth control pills which got better once there was stopped.  She had to have a double shot to help treat her endometriosis in spite of having an IUD in place.  This could have been the trigger.  Though the location and the character of the pain is not consistent with migraines, the rest of this symptoms like light and noise sensitivity raises concerns that these could be migraines.  I advised a regimen of ibuprofen and Tylenol for abortive therapy for now.  Give a injection of Toradol in the clinic today to accelerate recovery.  Advised to maintain a headache log for the next month and recheck in 1 month for close follow-up.         Relevant Medications    ibuprofen (ADVIL/MOTRIN) 800 MG tablet    ketorolac (TORADOL) 60 MG/2ML  SOLN injection    SUMAtriptan (IMITREX) 25 MG tablet           Etelvina Suarez MD  [facility]

## 2018-08-13 NOTE — TELEPHONE ENCOUNTER
Vin Branham is a 28 year old female who informed MA with migraine with light sensitivity.    NURSING ASSESSMENT:  Description:  Migraine after getting Depo-Provera. Having a constant headache, sensitive to light, and intermittent ear pain. Started 08/10/2018 a few hours after Depo-Provera injection. Seasonal allergies. Working 3rd shift. Wearing sunglasses and a hat at work. Denies fevers, nasal congestion, facial pain, nausea, vomiting.  Onset/duration:  Since 08/10/2018  Precip. factors:  Recent Depo-Provera injection   Associated symptoms:  Migraine, light sensitivity, intermittent ear pain  Improves/worsens symptoms:  Same   Pain scale (0-10)   Moderate, has not tried medication yet  LMP/preg/breast feeding:  Patient's last menstrual period was 07/26/2018 (approximate).  Last exam/Treatment:  08/09/2018  Allergies:   Allergies   Allergen Reactions     Penicillins      NURSING PLAN: Nursing advice to patient to try home care measures and to be seen in clinic    RECOMMENDED DISPOSITION:  See in 24 hours   Will comply with recommendation: Yes  If further questions/concerns or if symptoms do not improve, worsen or new symptoms develop, call your PCP or [facility] as soon as possible.    NOTES:  Disposition was determined by the first positive assessment question, therefore all previous assessment questions were negative    Guideline used:  Telephone Triage Protocols for Nurses, Fifth Edition, Ina Bridges  Headache  Nursing Judgment    Natacha Amaya RN, BSN

## 2018-08-13 NOTE — TELEPHONE ENCOUNTER
Called patient and scheduled her for follow up on 9/10/2018.  She received the Depo on Friday and is experiencing really bad migraines, light sensitivity, and ears hurt.  She has never been prone to migraines in the past.     Thania Ley, JHONATAN  August 13, 2018

## 2018-08-14 ENCOUNTER — OFFICE VISIT (OUTPATIENT)
Dept: FAMILY MEDICINE | Facility: OTHER | Age: 28
End: 2018-08-14
Payer: COMMERCIAL

## 2018-08-14 VITALS
TEMPERATURE: 97.4 F | SYSTOLIC BLOOD PRESSURE: 112 MMHG | HEIGHT: 67 IN | BODY MASS INDEX: 24.8 KG/M2 | RESPIRATION RATE: 16 BRPM | WEIGHT: 158 LBS | DIASTOLIC BLOOD PRESSURE: 68 MMHG | HEART RATE: 89 BPM | OXYGEN SATURATION: 100 %

## 2018-08-14 DIAGNOSIS — R51.9 INTRACTABLE EPISODIC HEADACHE, UNSPECIFIED HEADACHE TYPE: Primary | ICD-10-CM

## 2018-08-14 PROCEDURE — 99214 OFFICE O/P EST MOD 30 MIN: CPT | Performed by: FAMILY MEDICINE

## 2018-08-14 RX ORDER — KETOROLAC TROMETHAMINE 30 MG/ML
60 INJECTION, SOLUTION INTRAMUSCULAR; INTRAVENOUS ONCE
Qty: 2 ML | Refills: 0
Start: 2018-08-14 | End: 2018-08-14

## 2018-08-14 RX ORDER — SUMATRIPTAN 25 MG/1
25-50 TABLET, FILM COATED ORAL
Qty: 18 TABLET | Refills: 1 | Status: SHIPPED | OUTPATIENT
Start: 2018-08-14 | End: 2019-08-26

## 2018-08-14 RX ORDER — IBUPROFEN 800 MG/1
800 TABLET, FILM COATED ORAL EVERY 8 HOURS PRN
Qty: 60 TABLET | Refills: 0 | Status: SHIPPED | OUTPATIENT
Start: 2018-08-14 | End: 2018-08-24

## 2018-08-14 ASSESSMENT — PAIN SCALES - GENERAL: PAINLEVEL: MODERATE PAIN (5)

## 2018-08-14 NOTE — NURSING NOTE
The following medication was given:     MEDICATION: Ketorolac Tromethamine 60MG/2ML (30 mg/mL) (Toradol)  ROUTE: IM  SITE: Deltoid - Right  DOSE: 2ML  LOT #: [id number]  :  NeXeption  EXPIRATION DATE:  09/19  NDC: 85589-332-66  Prior to injection verified patient identity using patient's name and date of birth.  Due to injection administration, patient instructed to remain in clinic for 15 minutes  afterwards, and to report any adverse reaction to me immediately.  Amanda Purvis CMA (AAMA)

## 2018-08-14 NOTE — ASSESSMENT & PLAN NOTE
This is a pleasant 28-year-old with history of endometriosis who comes to the clinic complaining of acute onset of headaches 5 days ago about 2 hours after receiving her Depo-Provera treatment for endometriosis.  She does not usually have headaches.  She has experienced headaches in the past when she was put oral birth control pills which got better once there was stopped.  She had to have a double shot to help treat her endometriosis in spite of having an IUD in place.  This could have been the trigger.  Though the location and the character of the pain is not consistent with migraines, the rest of this symptoms like light and noise sensitivity raises concerns that these could be migraines.  I advised a regimen of ibuprofen and Tylenol for abortive therapy for now.  Give a injection of Toradol in the clinic today to accelerate recovery.  Advised to maintain a headache log for the next month and recheck in 1 month for close follow-up.

## 2018-08-14 NOTE — MR AVS SNAPSHOT
"              After Visit Summary   8/14/2018    Vin Branham    MRN: [id number]           Patient Information     Date Of Birth          1990        Visit Information        Provider Department      8/14/2018 1:20 PM Etelvina Suarez MD [facility]        Today's Diagnoses     Intractable episodic headache, unspecified headache type    -  1       Follow-ups after your visit        Your next 10 appointments already scheduled     Sep 10, 2018  8:30 AM CDT   Office Visit with Sariah Lazo MD   [facility] ([facility])    [address] [facility] [id number] [phone]           Bring a current list of meds and any records pertaining to this visit. For Physicals, please bring immunization records and any forms needing to be filled out. Please arrive 10 minutes early to complete paperwork.              Who to contact     If you have questions or need follow up information about today's clinic visit or your schedule please contact [facility] directly at [phone].  Normal or non-critical lab and imaging results will be communicated to you by MyChart, letter or phone within 4 business days after the clinic has received the results. If you do not hear from us within 7 days, please contact the clinic through MyChart or phone. If you have a critical or abnormal lab result, we will notify you by phone as soon as possible.  Submit refill requests through [facility] or call your pharmacy and they will forward the refill request to us. Please allow 3 business days for your refill to be completed.          Additional Information About Your Visit        Care EveryWhere ID     This is your Care EveryWhere ID. This could be used by other organizations to access your [facility] medical records  [id number]        Your Vitals Were     Pulse Temperature Respirations Height Last Period Pulse Oximetry    89 97.4  F (36.3  C) (Temporal) 16 5' 7\" (1.702 " m) 07/26/2018 (Approximate) 100%    BMI (Body Mass Index)                   24.75 kg/m2            Blood Pressure from Last 3 Encounters:   08/14/18 112/68   08/10/18 114/70   08/09/18 110/64    Weight from Last 3 Encounters:   08/14/18 158 lb (71.7 kg)   08/09/18 162 lb (73.5 kg)   07/11/18 157 lb (71.2 kg)              Today, you had the following     No orders found for display         Today's Medication Changes          These changes are accurate as of 8/14/18  1:58 PM.  If you have any questions, ask your nurse or doctor.               Start taking these medicines.        Dose/Directions    ketorolac 60 MG/2ML Soln injection   Commonly known as:  TORADOL   Used for:  Intractable episodic headache, unspecified headache type   Started by:  Etelvina Suarez MD        Dose:  60 mg   Inject 2 mLs (60 mg) into the muscle once for 1 dose   Quantity:  2 mL   Refills:  0       SUMAtriptan 25 MG tablet   Commonly known as:  IMITREX   Used for:  Intractable episodic headache, unspecified headache type   Started by:  Etelvina Suarez MD        Dose:  25-50 mg   Take 1-2 tablets (25-50 mg) by mouth at onset of headache for migraine May repeat in 2 hours. Max 8 tablets/24 hours.   Quantity:  18 tablet   Refills:  1         These medicines have changed or have updated prescriptions.        Dose/Directions    * ibuprofen 600 MG tablet   Commonly known as:  ADVIL/MOTRIN   This may have changed:  Another medication with the same name was added. Make sure you understand how and when to take each.   Used for:  Ovarian cysts, Chronic pelvic pain in female, Endometriosis of pelvis   Changed by:  Etelvina Suarez MD        Dose:  600 mg   Take 1 tablet by mouth every 6 hours as needed for pain.   Quantity:  90 tablet   Refills:  3       * ibuprofen 800 MG tablet   Commonly known as:  ADVIL/MOTRIN   This may have changed:  You were already taking a medication with the same name, and this prescription was added. Make sure you  understand how and when to take each.   Used for:  Intractable episodic headache, unspecified headache type   Changed by:  Etelvina Suarez MD        Dose:  800 mg   Take 1 tablet (800 mg) by mouth every 8 hours as needed for moderate pain   Quantity:  60 tablet   Refills:  0       * Notice:  This list has 2 medication(s) that are the same as other medications prescribed for you. Read the directions carefully, and ask your doctor or other care provider to review them with you.         Where to get your medicines      These medications were sent to [place] [address] [place] [facility] [address]  [address] [facility] [address]     Phone:  [phone]     ibuprofen 800 MG tablet    SUMAtriptan 25 MG tablet         Some of these will need a paper prescription and others can be bought over the counter.  Ask your nurse if you have questions.     You don't need a prescription for these medications     ketorolac 60 MG/2ML Soln injection                Primary Care Provider Office Phone # Fax #    [facility] [phone]       [address] [place] [address]        Equal Access to Services     [facility] KAMI : Hadii alex estes hadasho Sonuzhat, waaxda luqadaha, qaybta kaalmada adestella, gill damian. So [facility] [phone].    ATENCIÓN: Si habla español, tiene a nunez disposición servicios gratuitos de asistencia lingüística. Freddie al [phone].    We comply with applicable federal civil rights laws and [place] laws. We do not discriminate on the basis of race, color, national origin, age, disability, sex, sexual orientation, or gender identity.            Thank you!     Thank you for choosing [facility]  for your care. Our goal is always to provide you with excellent care. Hearing back from our patients is one way we can continue to improve our services. Please take a few minutes to complete the written survey that you may  receive in the mail after your visit with us. Thank you!             Your Updated Medication List - Protect others around you: Learn how to safely use, store and throw away your medicines at www.disposemymeds.org.          This list is accurate as of 8/14/18  1:59 PM.  Always use your most recent med list.                   Brand Name Dispense Instructions for use Diagnosis    * ibuprofen 600 MG tablet    ADVIL/MOTRIN    90 tablet    Take 1 tablet by mouth every 6 hours as needed for pain.    Ovarian cysts, Chronic pelvic pain in female, Endometriosis of pelvis       * ibuprofen 800 MG tablet    ADVIL/MOTRIN    60 tablet    Take 1 tablet (800 mg) by mouth every 8 hours as needed for moderate pain    Intractable episodic headache, unspecified headache type       ketorolac 60 MG/2ML Soln injection    TORADOL    2 mL    Inject 2 mLs (60 mg) into the muscle once for 1 dose    Intractable episodic headache, unspecified headache type       levonorgestrel 20 MCG/24HR IUD    MIRENA     1 each by Intrauterine route once        medroxyPROGESTERone 150 MG/ML injection    DEPO-PROVERA    3 mL    Inject 1 mL (150 mg) into the muscle every 3 months    Endometriosis       spironolactone 100 MG tablet    ALDACTONE     100 mg        SUMAtriptan 25 MG tablet    IMITREX    18 tablet    Take 1-2 tablets (25-50 mg) by mouth at onset of headache for migraine May repeat in 2 hours. Max 8 tablets/24 hours.    Intractable episodic headache, unspecified headache type       TYLENOL PO      Take  by mouth.        * Notice:  This list has 2 medication(s) that are the same as other medications prescribed for you. Read the directions carefully, and ask your doctor or other care provider to review them with you.

## 2018-08-15 LAB
FINAL DIAGNOSIS: NORMAL
HPV HR 12 DNA CVX QL NAA+PROBE: NEGATIVE
HPV16 DNA SPEC QL NAA+PROBE: NEGATIVE
HPV18 DNA SPEC QL NAA+PROBE: NEGATIVE
SPECIMEN DESCRIPTION: NORMAL
SPECIMEN SOURCE CVX/VAG CYTO: NORMAL

## 2018-08-16 DIAGNOSIS — Z83.42 FAMILY HISTORY OF FAMILIAL HYPERCHOLESTEROLEMIA: Primary | ICD-10-CM

## 2018-08-16 DIAGNOSIS — E78.5 HYPERLIPIDEMIA LDL GOAL <160: ICD-10-CM

## 2018-09-10 ENCOUNTER — OFFICE VISIT (OUTPATIENT)
Dept: OBGYN | Facility: OTHER | Age: 28
End: 2018-09-10
Payer: COMMERCIAL

## 2018-09-10 VITALS
SYSTOLIC BLOOD PRESSURE: 110 MMHG | HEART RATE: 76 BPM | DIASTOLIC BLOOD PRESSURE: 70 MMHG | WEIGHT: 158 LBS | BODY MASS INDEX: 24.75 KG/M2

## 2018-09-10 DIAGNOSIS — N80.9 ENDOMETRIOSIS: Primary | ICD-10-CM

## 2018-09-10 PROCEDURE — 99213 OFFICE O/P EST LOW 20 MIN: CPT | Performed by: OBSTETRICS & GYNECOLOGY

## 2018-09-10 NOTE — PROGRESS NOTES
OB/GYN  9/10/2018      NAME:  Vin Branham  PCP:  [facility]  MRN:  [id number]      Impression / Plan     28 year old  with:      ICD-10-CM    1. Endometriosis N80.9 OB/GYN REFERRAL       Management options discussed.  She had a small amount of disease on laparoscopy in .  She did not have significant relief with Depo-Lupron, Mirena IUD, or Depo-Provera.  We discussed hysterectomy with bilateral oophorectomy, but then she would be treating symptoms of endometriosis for symptoms of menopause.  Patient is not ready for surgical management.  She is not interested in narcotics.  She does not feel another laparoscopic excision of endometriosis would be beneficial either.  She will consider other medical management options over the next 6 weeks or so and get back to me.    Offered a second opinion and patient would like to pursue that.  She will follow up with me as needed.      Chief Complaint     Chief Complaint   Patient presents with     Follow Up For     Birth control       HPI     Vin Branham is a  28 year old female who is seen for follow up. Please see my last note 18.   In review, patient had a robotic assisted operative laparoscopy with resection of endometriosis on 2013 with Dr. Tolliver of [facility].  Patient had a Mirena IUD inserted on 2014.  Patient has tried pelvic floor physical therapy but that was over 2 years ago.  Minimal improvement  Prior to the laparoscopic resection of endometriosis, patient was treated with Depo-Lupron ().      Ultrasound 2018:    Uterus 8.9 x 4.2 x 5.6 cm.    Endometrium 4 mm    IUD in expected position    Normal ovaries bilaterally.  No free fluid    Patient received a Depo-Provera injection on 8/10/2018.  Mirena IUD is still in place and due for removal in a few months.  The Mirena did not give her optimal pain relief, even when it was initially placed.  Patient states her duration of pain has  lessened since the Depo-Provera injection.  However she still has the same amount of painful days and the severity is unchanged.  She has noticed some hair loss while showering since the Depo-Provera injection.  She also had worsening with headaches after the injection, but this has improved.  She has noticed increase in acne and this is bothersome for her.  She continues the Spironolactone.      Patient's last menstrual period was 08/26/2018.     Date of Last Pap Smear:   Lab Results   Component Value Date    PAP NIL 08/09/2018       Problem List     Patient Active Problem List    Diagnosis Date Noted     Intractable episodic headache, unspecified headache type 08/14/2018     Priority: Medium     IUD (intrauterine device) in place 08/09/2018     Priority: Medium     Mirena placed 1/21/14.  Due for removal Jan 2019       Family history of familial hypercholesterolemia 07/11/2018     Priority: Medium     Chronic pelvic pain in female 02/26/2013     Priority: Medium     Ovarian cysts 02/26/2013     Priority: Medium     Problem list name updated by automated process. Provider to review and confirm       Endometriosis of pelvis 02/26/2013     Priority: Medium       Medications     Current Outpatient Prescriptions   Medication     Acetaminophen (TYLENOL PO)     ibuprofen (ADVIL,MOTRIN) 600 MG tablet     levonorgestrel (MIRENA) 20 MCG/24HR IUD     medroxyPROGESTERone (DEPO-PROVERA) 150 MG/ML injection     spironolactone (ALDACTONE) 100 MG tablet     SUMAtriptan (IMITREX) 25 MG tablet     No current facility-administered medications for this visit.         Allergies     Allergies   Allergen Reactions     Penicillins        Past Medical/Surgical History     Past Medical History:   Diagnosis Date     Asthma     Only as a child     Endometriosis        Past Surgical History:   Procedure Laterality Date     APPENDECTOMY  2010     DAVINCI ASSISTED ABLATION / EXCISION OF ENDOMETRIOSIS  12/06/2013    Dr. Tollievr of [facility]  at [facility]     ESOPHAGOSCOPY, GASTROSCOPY, DUODENOSCOPY (EGD), COMBINED  4/16/2013    Procedure: COMBINED ESOPHAGOSCOPY, GASTROSCOPY, DUODENOSCOPY (EGD);  EGD  Nausea   pkt given in clinic  [facility];  Surgeon: Oliver Del Cid MD;  Location: [facility] OR     GYN SURGERY  2011, 2012    cystectomy        Social History     Social History     Social History     Marital status:      Spouse name: N/A     Number of children: N/A     Years of education: N/A     Occupational History     Not on file.     Social History Main Topics     Smoking status: Former Smoker     Types: Cigarettes     Smokeless tobacco: Never Used     Alcohol use Yes     Drug use: No     Sexual activity: Yes     Partners: Male     Other Topics Concern     Parent/Sibling W/ Cabg, Mi Or Angioplasty Before 65f 55m? No     Social History Narrative       Family History      History reviewed. No pertinent family history.    ROS     A 10 organ review of systems was asked and the pertinent positives and negatives are listed in the HPI.     Physical Exam   Vitals: /70 (BP Location: Right arm, Patient Position: Chair, Cuff Size: Adult Regular)  Pulse 76  Wt 158 lb (71.7 kg)  LMP 08/26/2018  BMI 24.75 kg/m2    General: Comfortable, no obvious distress  Psych: Alert and orientated x 3. Appropriate affect, good insight.   : deferred      Labs/Imaging       Labs were reviewed in [facility]     Imaging was reviewed in Epic.       14 minutes was spent with patient, more than 50% counseling and coordinating care      Sariah Lazo MD

## 2018-09-10 NOTE — MR AVS SNAPSHOT
After Visit Summary   9/10/2018    Vin Branham    MRN: [id number]           Patient Information     Date Of Birth          1990        Visit Information        Provider Department      9/10/2018 8:30 AM Sariah Lazo MD [facility]        Today's Diagnoses     Endometriosis    -  1       Follow-ups after your visit        Additional Services     OB/GYN REFERRAL       Your provider has referred you to:  [facility]: [facility] [phone]   [email or web address]    Please be aware that coverage of these services is subject to the terms and limitations of your health insurance plan.  Call member services at your health plan with any benefit or coverage questions.      Please bring the following with you to your appointment:    (1) Any X-Rays, CTs or MRIs which have been performed.  Contact the facility where they were done to arrange for  prior to your scheduled appointment.   (2) List of current medications   (3) This referral request   (4) Any documents/labs given to you for this referral                  Follow-up notes from your care team     Return if symptoms worsen or fail to improve.      Who to contact     If you have questions or need follow up information about today's clinic visit or your schedule please contact [facility] directly at [phone].  Normal or non-critical lab and imaging results will be communicated to you by MyChart, letter or phone within 4 business days after the clinic has received the results. If you do not hear from us within 7 days, please contact the clinic through MyChart or phone. If you have a critical or abnormal lab result, we will notify you by phone as soon as possible.  Submit refill requests through [employer] or call your pharmacy and they will forward the refill request to us. Please allow 3 business days for your refill to be  completed.          Additional Information About Your Visit        Care EveryWhere ID     This is your Care EveryWhere ID. This could be used by other organizations to access your [facility] medical records  [id number]        Your Vitals Were     Pulse Last Period BMI (Body Mass Index)             76 08/26/2018 24.75 kg/m2          Blood Pressure from Last 3 Encounters:   09/10/18 110/70   08/14/18 112/68   08/10/18 114/70    Weight from Last 3 Encounters:   09/10/18 158 lb (71.7 kg)   08/14/18 158 lb (71.7 kg)   08/09/18 162 lb (73.5 kg)              We Performed the Following     OB/GYN REFERRAL        Primary Care Provider Office Phone # Fax #    [facility] [phone]       [address] [place] [id number]        Equal Access to Services     CHENTE MCCLURE : Ivelisse bernabe Sonuzhat, waaxda luqadaha, qaybta kaalmada alvaro, gill paz . So [facility] [phone].    ATENCIÓN: Si habla español, tiene a nunez disposición servicios gratuitos de asistencia lingüística. Freddie al [phone].    We comply with applicable federal civil rights laws and [place] laws. We do not discriminate on the basis of race, color, national origin, age, disability, sex, sexual orientation, or gender identity.            Thank you!     Thank you for choosing [facility]  for your care. Our goal is always to provide you with excellent care. Hearing back from our patients is one way we can continue to improve our services. Please take a few minutes to complete the written survey that you may receive in the mail after your visit with us. Thank you!             Your Updated Medication List - Protect others around you: Learn how to safely use, store and throw away your medicines at www.disposemymeds.org.          This list is accurate as of 9/10/18  8:46 AM.  Always use your most recent med list.                   Brand Name Dispense Instructions for use  Diagnosis    ibuprofen 600 MG tablet    ADVIL/MOTRIN    90 tablet    Take 1 tablet by mouth every 6 hours as needed for pain.    Ovarian cysts, Chronic pelvic pain in female, Endometriosis of pelvis       levonorgestrel 20 MCG/24HR IUD    MIRENA     1 each by Intrauterine route once        medroxyPROGESTERone 150 MG/ML injection    DEPO-PROVERA    3 mL    Inject 1 mL (150 mg) into the muscle every 3 months    Endometriosis       spironolactone 100 MG tablet    ALDACTONE     100 mg        SUMAtriptan 25 MG tablet    IMITREX    18 tablet    Take 1-2 tablets (25-50 mg) by mouth at onset of headache for migraine May repeat in 2 hours. Max 8 tablets/24 hours.    Intractable episodic headache, unspecified headache type       TYLENOL PO      Take  by mouth.

## 2018-09-10 NOTE — NURSING NOTE
"Chief Complaint   Patient presents with     Follow Up For     Birth control       Initial /70 (BP Location: Right arm, Patient Position: Chair, Cuff Size: Adult Regular)  Pulse 76  Wt 158 lb (71.7 kg)  LMP 2018  BMI 24.75 kg/m2 Estimated body mass index is 24.75 kg/(m^2) as calculated from the following:    Height as of 18: 5' 7\" (1.702 m).    Weight as of this encounter: 158 lb (71.7 kg).  BP completed using cuff size: regular        The following HM Due: NONE      The following patient reported/Care Every where data was sent to:  P ABSTRACT QUALITY INITIATIVES [[id number]]       N/a    Thania Ley, JHONATAN  September 10, 2018           "

## 2018-09-21 LAB — COPATH REPORT: NORMAL

## 2019-08-26 ENCOUNTER — OFFICE VISIT (OUTPATIENT)
Dept: OBGYN | Facility: OTHER | Age: 29
End: 2019-08-26
Payer: COMMERCIAL

## 2019-08-26 VITALS
SYSTOLIC BLOOD PRESSURE: 110 MMHG | HEART RATE: 78 BPM | HEIGHT: 67 IN | BODY MASS INDEX: 24.88 KG/M2 | WEIGHT: 158.5 LBS | DIASTOLIC BLOOD PRESSURE: 72 MMHG

## 2019-08-26 DIAGNOSIS — Z01.419 WELL FEMALE EXAM WITH ROUTINE GYNECOLOGICAL EXAM: Primary | ICD-10-CM

## 2019-08-26 DIAGNOSIS — L70.9 ACNE, UNSPECIFIED ACNE TYPE: ICD-10-CM

## 2019-08-26 DIAGNOSIS — Z13.6 CARDIOVASCULAR SCREENING; LDL GOAL LESS THAN 160: ICD-10-CM

## 2019-08-26 PROCEDURE — 99395 PREV VISIT EST AGE 18-39: CPT | Performed by: OBSTETRICS & GYNECOLOGY

## 2019-08-26 RX ORDER — SPIRONOLACTONE 100 MG/1
50 TABLET, FILM COATED ORAL 2 TIMES DAILY
Qty: 90 TABLET | Refills: 3 | Status: SHIPPED | OUTPATIENT
Start: 2019-08-26 | End: 2020-08-13

## 2019-08-26 ASSESSMENT — MIFFLIN-ST. JEOR: SCORE: 1464.19

## 2019-08-26 NOTE — NURSING NOTE
"Chief Complaint   Patient presents with     Physical       Initial /72 (BP Location: Right arm, Patient Position: Chair, Cuff Size: Adult Regular)   Pulse 78   Ht 1.698 m (5' 6.85\")   Wt 71.9 kg (158 lb 8 oz)   LMP 2019 (Approximate)   BMI 24.94 kg/m   Estimated body mass index is 24.94 kg/m  as calculated from the following:    Height as of this encounter: 1.698 m (5' 6.85\").    Weight as of this encounter: 71.9 kg (158 lb 8 oz).  BP completed using cuff size: regular        The following HM Due: NONE      The following patient reported/Care Every where data was sent to:  P ABSTRACT QUALITY INITIATIVES [[id number]]       n/a       Thania Ley CMA  2019    "
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
## 2019-08-29 DIAGNOSIS — Z13.6 CARDIOVASCULAR SCREENING; LDL GOAL LESS THAN 160: ICD-10-CM

## 2019-08-29 DIAGNOSIS — Z01.419 WELL FEMALE EXAM WITH ROUTINE GYNECOLOGICAL EXAM: ICD-10-CM

## 2019-08-29 LAB
CHOLEST SERPL-MCNC: 242 MG/DL
GLUCOSE SERPL-MCNC: 86 MG/DL (ref 70–99)
HDLC SERPL-MCNC: 66 MG/DL
LDLC SERPL CALC-MCNC: 166 MG/DL
NONHDLC SERPL-MCNC: 176 MG/DL
TRIGL SERPL-MCNC: 52 MG/DL

## 2019-08-29 PROCEDURE — 80061 LIPID PANEL: CPT | Performed by: OBSTETRICS & GYNECOLOGY

## 2019-08-29 PROCEDURE — 36415 COLL VENOUS BLD VENIPUNCTURE: CPT | Performed by: OBSTETRICS & GYNECOLOGY

## 2019-08-29 PROCEDURE — 82947 ASSAY GLUCOSE BLOOD QUANT: CPT | Performed by: OBSTETRICS & GYNECOLOGY

## 2020-03-01 ENCOUNTER — HEALTH MAINTENANCE LETTER (OUTPATIENT)
Age: 30
End: 2020-03-01

## 2020-08-13 DIAGNOSIS — L70.9 ACNE, UNSPECIFIED ACNE TYPE: ICD-10-CM

## 2020-08-13 RX ORDER — SPIRONOLACTONE 100 MG/1
50 TABLET, FILM COATED ORAL 2 TIMES DAILY
Qty: 90 TABLET | Refills: 0 | Status: SHIPPED | OUTPATIENT
Start: 2020-08-13 | End: 2020-11-05

## 2020-08-13 NOTE — TELEPHONE ENCOUNTER
"Diuretics (Including Combos) Protocol Failed     Rerun Protocol  (8/13/2020 8:43 AM)       Normal serum creatinine on file in past 12 months         Recent Labs   Lab Test 07/11/18  0803   CR 0.84             Normal serum potassium on file in past 12 months         Recent Labs   Lab Test 07/11/18  0803   POTASSIUM 4.0                      Normal serum sodium on file in past 12 months         Recent Labs   Lab Test 07/11/18  0803                Blood pressure under 140/90 in past 12 months         BP Readings from Last 3 Encounters:   08/26/19 110/72   09/10/18 110/70   08/14/18 112/68                Recent (12 mo) or future (30 days) visit within the authorizing provider's specialty     Patient has had an office visit with the authorizing provider or a provider within the authorizing providers department within the previous 12 mos or has a future within next 30 days. See \"Patient Info\" tab in inbasket, or \"Choose Columns\" in Meds & Orders section of the refill encounter.              Medication is active on med list         Patient is age 18 or older         No active pregancy on record         No positive pregnancy test in past 12 months        Pt last seen 8/26/2019 for annual exam.    Last prescribed on 8/26/2019 for 90 tablets with 3 refills.    RN sent Recorded Future reminder for annual exam.    Routing refill request to provider for review/approval because:  Labs not current:  Potassium, creatinine, sodium.    Thuy Garcia RN on 8/13/2020 at 9:01 AM            "

## 2020-11-04 DIAGNOSIS — L70.9 ACNE, UNSPECIFIED ACNE TYPE: ICD-10-CM

## 2020-11-05 RX ORDER — SPIRONOLACTONE 100 MG/1
50 TABLET, FILM COATED ORAL 2 TIMES DAILY
Qty: 30 TABLET | Refills: 0 | Status: SHIPPED | OUTPATIENT
Start: 2020-11-05 | End: 2021-02-13

## 2020-11-05 NOTE — TELEPHONE ENCOUNTER
Pending Prescriptions:                       Disp   Refills    spironolactone (ALDACTONE) 100 MG tablet   90 tab*0        Sig: Take 0.5 tablets (50 mg) by mouth 2 times daily    Routing refill request to provider for review/approval because:  Labs and BP

## 2020-12-09 ENCOUNTER — OFFICE VISIT (OUTPATIENT)
Dept: OBGYN | Facility: OTHER | Age: 30
End: 2020-12-09
Payer: COMMERCIAL

## 2020-12-09 VITALS
HEART RATE: 78 BPM | BODY MASS INDEX: 26.41 KG/M2 | HEIGHT: 67 IN | SYSTOLIC BLOOD PRESSURE: 100 MMHG | OXYGEN SATURATION: 99 % | DIASTOLIC BLOOD PRESSURE: 64 MMHG | WEIGHT: 168.25 LBS

## 2020-12-09 DIAGNOSIS — Z83.42 FAMILY HISTORY OF FAMILIAL HYPERCHOLESTEROLEMIA: ICD-10-CM

## 2020-12-09 DIAGNOSIS — Z01.419 WELL FEMALE EXAM WITH ROUTINE GYNECOLOGICAL EXAM: Primary | ICD-10-CM

## 2020-12-09 DIAGNOSIS — Z13.6 CARDIOVASCULAR SCREENING; LDL GOAL LESS THAN 160: ICD-10-CM

## 2020-12-09 DIAGNOSIS — Z32.00 PREGNANCY EXAMINATION OR TEST, PREGNANCY UNCONFIRMED: ICD-10-CM

## 2020-12-09 DIAGNOSIS — Z30.433 ENCOUNTER FOR REMOVAL AND REINSERTION OF IUD: ICD-10-CM

## 2020-12-09 DIAGNOSIS — Z97.5 IUD (INTRAUTERINE DEVICE) IN PLACE: ICD-10-CM

## 2020-12-09 LAB — HCG UR QL: NEGATIVE

## 2020-12-09 PROCEDURE — 58301 REMOVE INTRAUTERINE DEVICE: CPT | Performed by: OBSTETRICS & GYNECOLOGY

## 2020-12-09 PROCEDURE — 81025 URINE PREGNANCY TEST: CPT | Performed by: OBSTETRICS & GYNECOLOGY

## 2020-12-09 PROCEDURE — 99395 PREV VISIT EST AGE 18-39: CPT | Mod: 25 | Performed by: OBSTETRICS & GYNECOLOGY

## 2020-12-09 PROCEDURE — 58300 INSERT INTRAUTERINE DEVICE: CPT | Performed by: OBSTETRICS & GYNECOLOGY

## 2020-12-09 ASSESSMENT — MIFFLIN-ST. JEOR: SCORE: 1509.77
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
## 2020-12-14 ENCOUNTER — HEALTH MAINTENANCE LETTER (OUTPATIENT)
Age: 30
End: 2020-12-14

## 2020-12-22 DIAGNOSIS — L70.9 ACNE, UNSPECIFIED ACNE TYPE: ICD-10-CM

## 2020-12-22 NOTE — TELEPHONE ENCOUNTER
Fax received for refill request. Pended and routed to RN pool.    Per 11/04 refill encounter - patient was told she needed an appointment. Had a physical on 12/09 with MM. I don't see this was discussed.

## 2020-12-22 NOTE — TELEPHONE ENCOUNTER
"Requested Prescriptions   Pending Prescriptions Disp Refills     spironolactone (ALDACTONE) 100 MG tablet 30 tablet 0     Sig: Take 0.5 tablets (50 mg) by mouth 2 times daily Please make clinic apt. for refills       Diuretics (Including Combos) Protocol Failed - 12/22/2020  9:45 AM        Failed - Normal serum creatinine on file in past 12 months     Recent Labs   Lab Test 07/11/18  0803   CR 0.84              Failed - Normal serum potassium on file in past 12 months     Recent Labs   Lab Test 07/11/18  0803   POTASSIUM 4.0                    Failed - Normal serum sodium on file in past 12 months     Recent Labs   Lab Test 07/11/18  0803                 Passed - Blood pressure under 140/90 in past 12 months     BP Readings from Last 3 Encounters:   12/09/20 100/64   08/26/19 110/72   09/10/18 110/70                 Passed - Recent (12 mo) or future (30 days) visit within the authorizing provider's specialty     Patient has had an office visit with the authorizing provider or a provider within the authorizing providers department within the previous 12 mos or has a future within next 30 days. See \"Patient Info\" tab in inbasket, or \"Choose Columns\" in Meds & Orders section of the refill encounter.              Passed - Medication is active on med list        Passed - Patient is age 18 or older        Passed - No active pregancy on record        Passed - No positive pregnancy test in past 12 months           Routing refill request to provider for review/approval because:  Labs not current:  Creatinine, sodium and potassium.    Sofía Mane RN          "

## 2020-12-23 NOTE — TELEPHONE ENCOUNTER
Unable to reach patient via phone regarding labs prior to RX refill.  Left message to call clinic back at [phone] and ask for Women's Health.

## 2020-12-23 NOTE — TELEPHONE ENCOUNTER
Please confirm her pharmacy.    Also she needs labs done before I am able to do the refill.  These are fasting labs, so please remind her.

## 2020-12-29 NOTE — TELEPHONE ENCOUNTER
Unable to reach patient via phone.  Left message to call clinic back at [phone] and ask for Women's Health.

## 2020-12-31 NOTE — TELEPHONE ENCOUNTER
Unable to reach patient via phone. RN left a message and instructed patient to call the clinic at [phone] and ask for Women's Health.    RN notes unable to reach pt x4. RN routing to provider to advise on next steps.    Thuy Garcia RN on 12/31/2020 at 3:00 PM

## 2021-01-02 RX ORDER — SPIRONOLACTONE 100 MG/1
50 TABLET, FILM COATED ORAL 2 TIMES DAILY
Qty: 30 TABLET | Refills: 0 | OUTPATIENT
Start: 2021-01-02

## 2021-01-27 ENCOUNTER — OFFICE VISIT (OUTPATIENT)
Dept: ALLERGY | Facility: OTHER | Age: 31
End: 2021-01-27
Payer: COMMERCIAL

## 2021-01-27 VITALS
OXYGEN SATURATION: 99 % | DIASTOLIC BLOOD PRESSURE: 70 MMHG | WEIGHT: 168 LBS | SYSTOLIC BLOOD PRESSURE: 103 MMHG | HEIGHT: 67 IN | BODY MASS INDEX: 26.37 KG/M2 | HEART RATE: 85 BPM

## 2021-01-27 DIAGNOSIS — R14.0 ABDOMINAL BLOATING: Primary | ICD-10-CM

## 2021-01-27 DIAGNOSIS — R19.7 DIARRHEA, UNSPECIFIED TYPE: ICD-10-CM

## 2021-01-27 DIAGNOSIS — Z88.0 PENICILLIN ALLERGY: ICD-10-CM

## 2021-01-27 PROCEDURE — 99203 OFFICE O/P NEW LOW 30 MIN: CPT | Performed by: ALLERGY & IMMUNOLOGY

## 2021-01-27 ASSESSMENT — PAIN SCALES - GENERAL: PAINLEVEL: NO PAIN (0)

## 2021-01-27 ASSESSMENT — MIFFLIN-ST. JEOR: SCORE: 1508.63

## 2021-01-27 NOTE — ASSESSMENT & PLAN NOTE
Abdominal bloating and cramping.  Associated diarrhea.  Occurs after eating most foods including fruits, breads, tree nuts, milk.  Symptoms are immediate.  No other IgE mediated symptoms.  She is avoiding wheat products.    -Doubt symptoms are related to specific IgE mediated food allergy.  She has no other IgE mid symptoms and is reacting to numerous different foods.  -Referral to GI.

## 2021-01-27 NOTE — ASSESSMENT & PLAN NOTE
History of rash after receiving penicillin antibiotic in early childhood.  Subsequently avoided.    - Discussed with patient and family that only about 15% of those that think they are allergic actually are and of those 15%, 80% outgrow their penicillin allergy within 10 years.   - Fortunately, there is skin testing to ascertain if patient is truly allergic.   - Would recommend patient return to clinic for penicillin testing and oral challenge. Discussed testing with family and patient.

## 2021-01-27 NOTE — LETTER
1/27/2021         RE: Vin Branham  [facility] [place] [id number]        Dear Colleague,    Thank you for referring your patient, Vin Branham, to the [facility]. Please see a copy of my visit note below.    Vin Branham is a 30 year old White female with previous medical history significant for penicillin allergy. Vin Branham is being seen today for evaluation of allergies to medication and bloating.     Patient reports that over the course of the last 2 to 3 years after eating numerous different types of foods within minutes she will develop bloating, increased gas, diarrhea and belching.  This has occurred with numerous different fruits, breads, pistachio, milk.  Symptoms lasting more from 2 hours up until 2 days.  Denies any IgE mediated symptoms such as hives, angioedema, shortness of breath, wheezing, congestion, etc.  No recent GI evaluation.    Patient reports that she had full body rash after receiving penicillin in early childhood.  Subsequently avoided all penicillin antibiotics.    ENVIRONMENTAL HISTORY: [place] The home is heated with a electric furnace and forced air. They do have central air conditioning. The patient's bedroom is furnished with carpeting in bedroom.  Pets inside the house include 2 dog(s). There is no history of cockroach or mice infestation. There is/are 0 smokers in the house.  The house does not have a damp basement.     Past Medical History:   Diagnosis Date     Asthma     Only as a child     Endometriosis      Family History   Problem Relation Age of Onset     Hyperlipidemia Mother      Endometriosis Mother      Endometriosis Maternal Grandmother      Hyperlipidemia Maternal Grandfather      Diabetes Type 2  Paternal Grandmother      Cancer Paternal Grandmother      Prostate Cancer Paternal Grandfather      Past Surgical History:   Procedure Laterality Date      APPENDECTOMY  2010     DAVINCI ASSISTED ABLATION / EXCISION OF ENDOMETRIOSIS  12/06/2013    Dr. Tolliver of [facility] at [facility]     ESOPHAGOSCOPY, GASTROSCOPY, DUODENOSCOPY (EGD), COMBINED  4/16/2013    Procedure: COMBINED ESOPHAGOSCOPY, GASTROSCOPY, DUODENOSCOPY (EGD);  EGD  Nausea   pkt given in clinic  [facility];  Surgeon: Oliver Del Cid MD;  Location: [facility] OR     GYN SURGERY  2011, 2012    cystectomy       REVIEW OF SYSTEMS:  General: negative for weight gain. negative for weight loss. negative for changes in sleep.   Ears: negative for fullness. negative for hearing loss. negative for dizziness.   Nose: negative for snoring.negative for changes in smell. negative for drainage.   Eyes: negative for eye watering. negative for eye itching. negative for vision changes. negative for eye redness.  Throat: negative for hoarseness. negative for sore throat. negative for trouble swallowing.   Lungs: negative for shortness of breath.negative for wheezing. negative for sputum production.   Cardiovascular: negative for chest pain. negative for swelling of ankles. negative for fast or irregular heartbeat.   Gastrointestinal: negative for nausea. negative for heartburn. negative for acid reflux.   Musculoskeletal: negative for joint pain. negative for joint stiffness. negative for joint swelling.   Neurologic: negative for seizures. negative for fainting. negative for weakness.   Psychiatric: negative for changes in mood. negative for anxiety.   Endocrine: negative for cold intolerance. negative for heat intolerance. negative for tremors.   Lymphatic: negative for lower extremity swelling. negative for lymph node swelling.   Hematologic: negative for easy bruising. negative for easy bleeding.  Integumentary: negative for rash. negative for scaling. negative for nail changes.       Current Outpatient Medications:      Acetaminophen (TYLENOL PO), Take  by mouth., Disp: , Rfl:      ibuprofen (ADVIL,MOTRIN) 600 MG  tablet, Take 1 tablet by mouth every 6 hours as needed for pain., Disp: 90 tablet, Rfl: 3     levonorgestrel (MIRENA) 20 MCG/24HR IUD, 1 each by Intrauterine route once, Disp: , Rfl:      Naproxen Sodium (ALEVE PO), , Disp: , Rfl:      spironolactone (ALDACTONE) 100 MG tablet, Take 0.5 tablets (50 mg) by mouth 2 times daily Please make clinic apt. for refills, Disp: 30 tablet, Rfl: 0  Immunization History   Administered Date(s) Administered     TDAP Vaccine (Adacel) 07/23/2018     Allergies   Allergen Reactions     Penicillins          EXAM:   Constitutional:  Appears well-developed and well-nourished. No distress.   HEENT:   Head: Normocephalic.   Nasal tissue pink and normal appearing.  No rhinorrhea noted.    Eyes: Conjunctivae are non-erythematous   Cardiovascular: Normal rate, regular rhythm and normal heart sounds. Exam reveals no gallop and no friction rub.   No murmur heard.  Respiratory: Effort normal and breath sounds normal. No respiratory distress. No wheezes. No rales.   Musculoskeletal: Normal range of motion.   Neuro: Oriented to person, place, and time.  Skin: Skin is warm and dry. No rash noted.   Psychiatric: Normal mood and affect.     Nursing note and vitals reviewed.    ASSESSMENT/PLAN:  Problem List Items Addressed This Visit        Digestive    Diarrhea, unspecified type       Other    Abdominal bloating - Primary     Abdominal bloating and cramping.  Associated diarrhea.  Occurs after eating most foods including fruits, breads, tree nuts, milk.  Symptoms are immediate.  No other IgE mediated symptoms.  She is avoiding wheat products.    -Doubt symptoms are related to specific IgE mediated food allergy.  She has no other IgE mid symptoms and is reacting to numerous different foods.  -Referral to GI.         Relevant Orders    GASTROENTEROLOGY ADULT REF CONSULT ONLY    Penicillin allergy     History of rash after receiving penicillin antibiotic in early childhood.  Subsequently avoided.    -  Discussed with patient and family that only about 15% of those that think they are allergic actually are and of those 15%, 80% outgrow their penicillin allergy within 10 years.   - Fortunately, there is skin testing to ascertain if patient is truly allergic.   - Would recommend patient return to clinic for penicillin testing and oral challenge. Discussed testing with family and patient.                  Chart documentation with Dragon Voice recognition Software. Although reviewed after completion, some words and grammatical errors may remain.    Regis Dickerson DO [facility]  Medical Director for Allergy/Immunology at [place], [place]        Again, thank you for allowing me to participate in the care of your patient.        Sincerely,        Regis Dickerson DO

## 2021-01-27 NOTE — PATIENT INSTRUCTIONS
Allergy Staff Appt Hours Shot Hours Locations    Physician     Regis Dickerson DO       Support Staff     CRAIG Lopez CMA  Tuesday:        [place] 7-5 Wednesday:        [place]: 7-5 Thursday:                    [place] 7-6     Friday:  [place]  7-2   [place]        Thursday: 8-5:20        Friday: 7-12     [place]        Tuesday: 7- 3:20 Wednesday: 7-4:20     [place] Monday: 7-2:20 Tuesday: 9-5:20         [facility]  [address] [place] [address]  Appt Line: [phone]  Allergy RN:  [phone]    [facility]  [address] [place] [address]  Appt Line: [phone]  Allergy RN:  [phone]       Important Scheduling Information  Aspirin Desensitization: Appt will last 2 clinic days. Please call the Allergy RN line for your clinic to schedule. Discontinue antihistamines 7 days prior to the appointment.     Food Challenges: Appt will last 3-4 hours. Please call the Allergy RN line for your clinic to schedule. Discontinue antihistamines 7 days prior to the appointment.     Penicillin Testing: Appt will last 2-3 hours. Please call the Allergy RN line for your clinic to schedule. Discontinue antihistamines 7 days prior to the appointment.     Skin Testing: Appt will about 40 minutes. Call the appointment line for your clinic to schedule. Discontinue antihistamines 7 days prior to the appointment.     Venom Testing: Appt will last 2-3 hours. Please call the Allergy RN line for your clinic to schedule. Discontinue antihistamines 7 days prior to the appointment.     Thank you for trusting us with your Allergy, Asthma, and Immunology care. Please feel free to contact us with any questions or concerns you may have.      - GI referral.   - Return to clinic for penicillin testing.

## 2021-01-27 NOTE — PROGRESS NOTES
Vin Branham is a 30 year old White female with previous medical history significant for penicillin allergy. Vin Branham is being seen today for evaluation of allergies to medication and bloating.     Patient reports that over the course of the last 2 to 3 years after eating numerous different types of foods within minutes she will develop bloating, increased gas, diarrhea and belching.  This has occurred with numerous different fruits, breads, pistachio, milk.  Symptoms lasting more from 2 hours up until 2 days.  Denies any IgE mediated symptoms such as hives, angioedema, shortness of breath, wheezing, congestion, etc.  No recent GI evaluation.    Patient reports that she had full body rash after receiving penicillin in early childhood.  Subsequently avoided all penicillin antibiotics.    ENVIRONMENTAL HISTORY: [place] The home is heated with a electric furnace and forced air. They do have central air conditioning. The patient's bedroom is furnished with carpeting in bedroom.  Pets inside the house include 2 dog(s). There is no history of cockroach or mice infestation. There is/are 0 smokers in the house.  The house does not have a damp basement.     Past Medical History:   Diagnosis Date     Asthma     Only as a child     Endometriosis      Family History   Problem Relation Age of Onset     Hyperlipidemia Mother      Endometriosis Mother      Endometriosis Maternal Grandmother      Hyperlipidemia Maternal Grandfather      Diabetes Type 2  Paternal Grandmother      Cancer Paternal Grandmother      Prostate Cancer Paternal Grandfather      Past Surgical History:   Procedure Laterality Date     APPENDECTOMY  2010     DAVINCI ASSISTED ABLATION / EXCISION OF ENDOMETRIOSIS  12/06/2013    Dr. Tolliver of [facility] at [facility]     ESOPHAGOSCOPY, GASTROSCOPY, DUODENOSCOPY (EGD), COMBINED  4/16/2013    Procedure: COMBINED ESOPHAGOSCOPY, GASTROSCOPY,  DUODENOSCOPY (EGD);  EGD  Nausea   pkt given in clinic  [facility];  Surgeon: Oliver Del Cid MD;  Location: [facility] OR     GYN SURGERY  2011, 2012    cystectomy       REVIEW OF SYSTEMS:  General: negative for weight gain. negative for weight loss. negative for changes in sleep.   Ears: negative for fullness. negative for hearing loss. negative for dizziness.   Nose: negative for snoring.negative for changes in smell. negative for drainage.   Eyes: negative for eye watering. negative for eye itching. negative for vision changes. negative for eye redness.  Throat: negative for hoarseness. negative for sore throat. negative for trouble swallowing.   Lungs: negative for shortness of breath.negative for wheezing. negative for sputum production.   Cardiovascular: negative for chest pain. negative for swelling of ankles. negative for fast or irregular heartbeat.   Gastrointestinal: negative for nausea. negative for heartburn. negative for acid reflux.   Musculoskeletal: negative for joint pain. negative for joint stiffness. negative for joint swelling.   Neurologic: negative for seizures. negative for fainting. negative for weakness.   Psychiatric: negative for changes in mood. negative for anxiety.   Endocrine: negative for cold intolerance. negative for heat intolerance. negative for tremors.   Lymphatic: negative for lower extremity swelling. negative for lymph node swelling.   Hematologic: negative for easy bruising. negative for easy bleeding.  Integumentary: negative for rash. negative for scaling. negative for nail changes.       Current Outpatient Medications:      Acetaminophen (TYLENOL PO), Take  by mouth., Disp: , Rfl:      ibuprofen (ADVIL,MOTRIN) 600 MG tablet, Take 1 tablet by mouth every 6 hours as needed for pain., Disp: 90 tablet, Rfl: 3     levonorgestrel (MIRENA) 20 MCG/24HR IUD, 1 each by Intrauterine route once, Disp: , Rfl:      Naproxen Sodium (ALEVE PO), , Disp: , Rfl:      spironolactone (ALDACTONE) 100  MG tablet, Take 0.5 tablets (50 mg) by mouth 2 times daily Please make clinic apt. for refills, Disp: 30 tablet, Rfl: 0  Immunization History   Administered Date(s) Administered     TDAP Vaccine (Adacel) 07/23/2018     Allergies   Allergen Reactions     Penicillins          EXAM:   Constitutional:  Appears well-developed and well-nourished. No distress.   HEENT:   Head: Normocephalic.   Nasal tissue pink and normal appearing.  No rhinorrhea noted.    Eyes: Conjunctivae are non-erythematous   Cardiovascular: Normal rate, regular rhythm and normal heart sounds. Exam reveals no gallop and no friction rub.   No murmur heard.  Respiratory: Effort normal and breath sounds normal. No respiratory distress. No wheezes. No rales.   Musculoskeletal: Normal range of motion.   Neuro: Oriented to person, place, and time.  Skin: Skin is warm and dry. No rash noted.   Psychiatric: Normal mood and affect.     Nursing note and vitals reviewed.    ASSESSMENT/PLAN:  Problem List Items Addressed This Visit        Digestive    Diarrhea, unspecified type       Other    Abdominal bloating - Primary     Abdominal bloating and cramping.  Associated diarrhea.  Occurs after eating most foods including fruits, breads, tree nuts, milk.  Symptoms are immediate.  No other IgE mediated symptoms.  She is avoiding wheat products.    -Doubt symptoms are related to specific IgE mediated food allergy.  She has no other IgE mid symptoms and is reacting to numerous different foods.  -Referral to GI.         Relevant Orders    GASTROENTEROLOGY ADULT REF CONSULT ONLY    Penicillin allergy     History of rash after receiving penicillin antibiotic in early childhood.  Subsequently avoided.    - Discussed with patient and family that only about 15% of those that think they are allergic actually are and of those 15%, 80% outgrow their penicillin allergy within 10 years.   - Fortunately, there is skin testing to ascertain if patient is truly allergic.   - Would  recommend patient return to clinic for penicillin testing and oral challenge. Discussed testing with family and patient.                  Chart documentation with Dragon Voice recognition Software. Although reviewed after completion, some words and grammatical errors may remain.    DO KARY Kramer  Medical Director for Allergy/Immunology at [place], [place]

## 2021-01-28 DIAGNOSIS — Z13.6 CARDIOVASCULAR SCREENING; LDL GOAL LESS THAN 160: ICD-10-CM

## 2021-01-28 DIAGNOSIS — Z01.419 WELL FEMALE EXAM WITH ROUTINE GYNECOLOGICAL EXAM: ICD-10-CM

## 2021-01-28 DIAGNOSIS — L70.9 ACNE, UNSPECIFIED ACNE TYPE: ICD-10-CM

## 2021-01-28 LAB
ANION GAP SERPL CALCULATED.3IONS-SCNC: 2 MMOL/L (ref 3–14)
BUN SERPL-MCNC: 19 MG/DL (ref 7–30)
CALCIUM SERPL-MCNC: 9.3 MG/DL (ref 8.5–10.1)
CHLORIDE SERPL-SCNC: 109 MMOL/L (ref 94–109)
CHOLEST SERPL-MCNC: 208 MG/DL
CO2 SERPL-SCNC: 28 MMOL/L (ref 20–32)
CREAT SERPL-MCNC: 0.8 MG/DL (ref 0.52–1.04)
GFR SERPL CREATININE-BSD FRML MDRD: >90 ML/MIN/{1.73_M2}
GLUCOSE SERPL-MCNC: 93 MG/DL (ref 70–99)
HDLC SERPL-MCNC: 66 MG/DL
LDLC SERPL CALC-MCNC: 121 MG/DL
NONHDLC SERPL-MCNC: 142 MG/DL
POTASSIUM SERPL-SCNC: 4 MMOL/L (ref 3.4–5.3)
SODIUM SERPL-SCNC: 139 MMOL/L (ref 133–144)
TRIGL SERPL-MCNC: 106 MG/DL

## 2021-01-28 PROCEDURE — 80048 BASIC METABOLIC PNL TOTAL CA: CPT | Performed by: OBSTETRICS & GYNECOLOGY

## 2021-01-28 PROCEDURE — 80061 LIPID PANEL: CPT | Performed by: OBSTETRICS & GYNECOLOGY

## 2021-01-28 PROCEDURE — 36415 COLL VENOUS BLD VENIPUNCTURE: CPT | Performed by: OBSTETRICS & GYNECOLOGY

## 2021-02-16 ENCOUNTER — TELEPHONE (OUTPATIENT)
Dept: ALLERGY | Facility: OTHER | Age: 31
End: 2021-02-16

## 2021-02-16 DIAGNOSIS — Z88.0 PENICILLIN ALLERGY: Primary | ICD-10-CM

## 2021-02-16 NOTE — TELEPHONE ENCOUNTER
----- Message from Kelsey Scott MA sent at 1/27/2021  9:29 AM CST -----  Regarding: PCN challenge  Pt scheduled for penicillin challenge 2/24 at 7 AM at [place]. Please order supplies.       Kelsey Scott MA

## 2021-02-24 ENCOUNTER — OFFICE VISIT (OUTPATIENT)
Dept: ALLERGY | Facility: OTHER | Age: 31
End: 2021-02-24
Payer: COMMERCIAL

## 2021-02-24 VITALS
WEIGHT: 168 LBS | HEIGHT: 67 IN | SYSTOLIC BLOOD PRESSURE: 120 MMHG | HEART RATE: 118 BPM | BODY MASS INDEX: 26.37 KG/M2 | DIASTOLIC BLOOD PRESSURE: 75 MMHG | OXYGEN SATURATION: 97 %

## 2021-02-24 DIAGNOSIS — T36.95XD: ICD-10-CM

## 2021-02-24 PROCEDURE — 95018 ALL TSTG PERQ&IQ DRUGS/BIOL: CPT | Performed by: ALLERGY & IMMUNOLOGY

## 2021-02-24 PROCEDURE — 95076 INGEST CHALLENGE INI 120 MIN: CPT | Performed by: ALLERGY & IMMUNOLOGY

## 2021-02-24 PROCEDURE — 99207 PR NO CHARGE LOS: CPT | Performed by: ALLERGY & IMMUNOLOGY

## 2021-02-24 ASSESSMENT — PAIN SCALES - GENERAL: PAINLEVEL: NO PAIN (0)

## 2021-02-24 ASSESSMENT — MIFFLIN-ST. JEOR: SCORE: 1504.67

## 2021-02-24 NOTE — ASSESSMENT & PLAN NOTE
History of rash after receiving penicillin antibiotic in early childhood.  Subsequently avoided.    Penicillin skin testing with Pre Pen and Pen G was negative.     Intradermal penicillin skin testing with Pre Pen and Pen G in duplicates was negative.    Amoxicillin oral challenge:  Passed. Observed for over 1.5 hours.     Outcome:  No penicillin allergic.

## 2021-02-24 NOTE — PROGRESS NOTES
Vin Branham is a 30 year old White female with previous medical history significant for penicillin allergy who returns for a follow up visit.    Patient presents today for penicillin skin testing. The patient is currently in a good state of health. No recent fevers, chills, cough, wheezing, shortness of breath, skin rash, angioedema, nausea, vomiting or diarrhea. The risks and benefits were discussed and the patient/patient's family wishes to proceed. The consent was signed.         Past Medical History:   Diagnosis Date     Asthma     Only as a child     Endometriosis      Family History   Problem Relation Age of Onset     Hyperlipidemia Mother      Endometriosis Mother      Endometriosis Maternal Grandmother      Hyperlipidemia Maternal Grandfather      Diabetes Type 2  Paternal Grandmother      Cancer Paternal Grandmother      Prostate Cancer Paternal Grandfather      Past Surgical History:   Procedure Laterality Date     APPENDECTOMY  2010     DAVINCI ASSISTED ABLATION / EXCISION OF ENDOMETRIOSIS  12/06/2013    Dr. Tolliver of [facility] at [facility]     ESOPHAGOSCOPY, GASTROSCOPY, DUODENOSCOPY (EGD), COMBINED  4/16/2013    Procedure: COMBINED ESOPHAGOSCOPY, GASTROSCOPY, DUODENOSCOPY (EGD);  EGD  Nausea   pkt given in clinic  [facility];  Surgeon: Oliver Del Cid MD;  Location: [facility]     GYN SURGERY  2011, 2012    cystectomy       REVIEW OF SYSTEMS:  Nose: negative for snoring.negative for changes in smell. negative for drainage.   Eyes: negative for eye watering. negative for eye itching. negative for vision changes. negative for eye redness.  Throat: negative for hoarseness. negative for sore throat. negative for trouble swallowing.   Lungs: negative for shortness of breath.negative for wheezing. negative for sputum production.   Integumentary: negative for rash. negative for scaling. negative for nail changes.       Current Outpatient Medications:      Acetaminophen (TYLENOL PO), Take  by  mouth., Disp: , Rfl:      ibuprofen (ADVIL,MOTRIN) 600 MG tablet, Take 1 tablet by mouth every 6 hours as needed for pain., Disp: 90 tablet, Rfl: 3     levonorgestrel (MIRENA) 20 MCG/24HR IUD, 1 each by Intrauterine route once, Disp: , Rfl:      Naproxen Sodium (ALEVE PO), , Disp: , Rfl:      PENICILLIN G SKIN TEST (ANJALI/DIMITRI PROTOCOL) CMPD KIT, Kit to consist of:  Pre-Pen (0.25 mL), penicillin G 100,000 units/mL (5 mL), amoxicillin 250 mg/5mL (80 mL)., Disp: 1 kit, Rfl: 0     spironolactone (ALDACTONE) 100 MG tablet, Take 1 tablet (100 mg) by mouth 2 times daily Please make clinic apt. for refills, Disp: 60 tablet, Rfl: 0  Immunization History   Administered Date(s) Administered     TDAP Vaccine (Adacel) 07/23/2018     No Active Allergies      EXAM:   Constitutional:  Appears well-developed and well-nourished. No distress.   HEENT:   Head: Normocephalic.   Cardiovascular: Normal rate, regular rhythm and normal heart sounds. Exam reveals no gallop and no friction rub.   No murmur heard.  Respiratory: Effort normal and breath sounds normal. No respiratory distress. No wheezes. No rales.   Musculoskeletal: Normal range of motion.   Neuro: Oriented to person, place, and time.  Skin: Skin is warm and dry. No rash noted.   Psychiatric: Normal mood and affect.     Nursing note and vitals reviewed.    ASSESSMENT/PLAN:  Problem List Items Addressed This Visit        Other    Adverse effect of antibiotic, subsequent encounter     History of rash after receiving penicillin antibiotic in early childhood.  Subsequently avoided.    Penicillin skin testing with Pre Pen and Pen G was negative.     Intradermal penicillin skin testing with Pre Pen and Pen G in duplicates was negative.    Amoxicillin oral challenge:  Passed. Observed for over 1.5 hours.     Outcome:  No penicillin allergic.            Relevant Orders    [facility] ALLG TEST PERQ & IC DRUG/BIOL IMMED REACT W/ I&R (Completed)    [facility] INGESTION CHALLENGE TEST INITIAL 120  MINUTES (Completed)          Chart documentation with Dragon Voice recognition Software. Although reviewed after completion, some words and grammatical errors may remain.    DO KARY Kramer  Medical Director for Allergy/Immunology at [place], [place]     TELE

## 2021-02-24 NOTE — LETTER
2/24/2021         RE: Vin Branham  [address] [place] [id number]        Dear Colleague,    Thank you for referring your patient, Vin Branham, to the [facility]. Please see a copy of my visit note below.    Vin Branham is a 30 year old White female with previous medical history significant for penicillin allergy who returns for a follow up visit.    Patient presents today for penicillin skin testing. The patient is currently in a good state of health. No recent fevers, chills, cough, wheezing, shortness of breath, skin rash, angioedema, nausea, vomiting or diarrhea. The risks and benefits were discussed and the patient/patient's family wishes to proceed. The consent was signed.         Past Medical History:   Diagnosis Date     Asthma     Only as a child     Endometriosis      Family History   Problem Relation Age of Onset     Hyperlipidemia Mother      Endometriosis Mother      Endometriosis Maternal Grandmother      Hyperlipidemia Maternal Grandfather      Diabetes Type 2  Paternal Grandmother      Cancer Paternal Grandmother      Prostate Cancer Paternal Grandfather      Past Surgical History:   Procedure Laterality Date     APPENDECTOMY  2010     DAVINCI ASSISTED ABLATION / EXCISION OF ENDOMETRIOSIS  12/06/2013    [facility]     ESOPHAGOSCOPY, GASTROSCOPY, DUODENOSCOPY (EGD), COMBINED  4/16/2013    Procedure: COMBINED ESOPHAGOSCOPY, GASTROSCOPY, DUODENOSCOPY (EGD);  EGD  Nausea   pkt given in clinic  [facility];  Surgeon: Oliver Del Cid MD;  Location: [facility] OR     GYN SURGERY  2011, 2012    cystectomy       REVIEW OF SYSTEMS:  Nose: negative for snoring.negative for changes in smell. negative for drainage.   Eyes: negative for eye watering. negative for eye itching. negative for vision changes. negative for eye redness.  Throat: negative for hoarseness. negative for sore throat. negative for trouble swallowing.    Lungs: negative for shortness of breath.negative for wheezing. negative for sputum production.   Integumentary: negative for rash. negative for scaling. negative for nail changes.       Current Outpatient Medications:      Acetaminophen (TYLENOL PO), Take  by mouth., Disp: , Rfl:      ibuprofen (ADVIL,MOTRIN) 600 MG tablet, Take 1 tablet by mouth every 6 hours as needed for pain., Disp: 90 tablet, Rfl: 3     levonorgestrel (MIRENA) 20 MCG/24HR IUD, 1 each by Intrauterine route once, Disp: , Rfl:      Naproxen Sodium (ALEVE PO), , Disp: , Rfl:      PENICILLIN G SKIN TEST (ANJALI/DIMITRI PROTOCOL) CMPD KIT, Kit to consist of:  Pre-Pen (0.25 mL), penicillin G 100,000 units/mL (5 mL), amoxicillin 250 mg/5mL (80 mL)., Disp: 1 kit, Rfl: 0     spironolactone (ALDACTONE) 100 MG tablet, Take 1 tablet (100 mg) by mouth 2 times daily Please make clinic apt. for refills, Disp: 60 tablet, Rfl: 0  Immunization History   Administered Date(s) Administered     TDAP Vaccine (Adacel) 07/23/2018     No Active Allergies      EXAM:   Constitutional:  Appears well-developed and well-nourished. No distress.   HEENT:   Head: Normocephalic.   Cardiovascular: Normal rate, regular rhythm and normal heart sounds. Exam reveals no gallop and no friction rub.   No murmur heard.  Respiratory: Effort normal and breath sounds normal. No respiratory distress. No wheezes. No rales.   Musculoskeletal: Normal range of motion.   Neuro: Oriented to person, place, and time.  Skin: Skin is warm and dry. No rash noted.   Psychiatric: Normal mood and affect.     Nursing note and vitals reviewed.    ASSESSMENT/PLAN:  Problem List Items Addressed This Visit        Other    Adverse effect of antibiotic, subsequent encounter     History of rash after receiving penicillin antibiotic in early childhood.  Subsequently avoided.    Penicillin skin testing with Pre Pen and Pen G was negative.     Intradermal penicillin skin testing with Pre Pen and Pen G in duplicates  was negative.    Amoxicillin oral challenge:  Passed. Observed for over 1.5 hours.     Outcome:  No penicillin allergic.            Relevant Orders    MI ALLG TEST PERQ & IC DRUG/BIOL IMMED REACT W/ I&R (Completed)    MI INGESTION CHALLENGE TEST INITIAL 120 MINUTES (Completed)          Chart documentation with Dragon Voice recognition Software. Although reviewed after completion, some words and grammatical errors may remain.    Regis Dickerson DO FAAAAI  Medical Director for Allergy/Immunology at [place], [place]        Again, thank you for allowing me to participate in the care of your patient.        Sincerely,        Regis Dickerson DO

## 2021-02-24 NOTE — NURSING NOTE
Patient evaluated by provider prior to start of penicillin challenge.  RN administered skin prick testing and intradermals per physician directed guidelines.  Patient was monitored for 15 minutes after each administered dose.  Both oral doses of Penicillin were given without reaction. Vital signs were recorded. Patient tolerated the testing well. All questions and concerns were addressed in clinic during challenge.     Amanda Mallory RN    
no

## 2021-02-24 NOTE — PATIENT INSTRUCTIONS
Allergy Staff Appt Hours Shot Hours Locations    Physician     Regis Dickerson DO       Support Staff     CRAIG Lopez CMA  Tuesday:        [place] 7-5 Wednesday:        [place]: 7-5 Thursday:                    [place] 7-6     Friday:  [place]  7-2   [place]        Thursday: 8-5:20        Friday: 7-12     [place]        Tuesday: 7- 3:20 Wednesday: 7-4:20     [place] Monday: 7-2:20 Tuesday: 9-5:20         [facility]  [address] [place] [address]  Appt Line: [phone]  Allergy RN:  [phone]    [facility]  [address] [place] [address]  Appt Line: [phone]  Allergy RN:  [phone]       Important Scheduling Information  Aspirin Desensitization: Appt will last 2 clinic days. Please call the Allergy RN line for your clinic to schedule. Discontinue antihistamines 7 days prior to the appointment.     Food Challenges: Appt will last 3-4 hours. Please call the Allergy RN line for your clinic to schedule. Discontinue antihistamines 7 days prior to the appointment.     Penicillin Testing: Appt will last 2-3 hours. Please call the Allergy RN line for your clinic to schedule. Discontinue antihistamines 7 days prior to the appointment.     Skin Testing: Appt will about 40 minutes. Call the appointment line for your clinic to schedule. Discontinue antihistamines 7 days prior to the appointment.     Venom Testing: Appt will last 2-3 hours. Please call the Allergy RN line for your clinic to schedule. Discontinue antihistamines 7 days prior to the appointment.     Thank you for trusting us with your Allergy, Asthma, and Immunology care. Please feel free to contact us with any questions or concerns you may have.

## 2021-03-08 DIAGNOSIS — L70.9 ACNE, UNSPECIFIED ACNE TYPE: ICD-10-CM

## 2021-03-09 RX ORDER — SPIRONOLACTONE 100 MG/1
100 TABLET, FILM COATED ORAL 2 TIMES DAILY
Qty: 180 TABLET | Refills: 3 | Status: SHIPPED | OUTPATIENT
Start: 2021-03-09 | End: 2022-03-08

## 2021-03-09 NOTE — TELEPHONE ENCOUNTER
Per prescription for spironolactone 100 mg that was sent to pharmacy on 2/15/21: please make clinic appointment for refills.    Will route a message to Dr. Lazo to clarify if she would like to see pt for further acne management or if she would like pt to follow up with an FP provider for this.    Sofía Mane RN

## 2021-03-18 NOTE — NURSING NOTE
Prior to injection verified patient identity using patient's name and date of birth.  Due to injection administration, patient instructed to remain in clinic for 15 minutes  afterwards, and to report any adverse reaction to me immediately.      The following medication was given:     MEDICATION: Depo Provera 150mg  ROUTE: IM  SITE: Ventrogluteal - Right  DOSE: 150mg  LOT #: [id number]  :  [employer]   EXPIRATION DATE:  06/2020  NDC#: [address]-7     Roxana BHATTI CMA ([facility])     Spoke with DAVY dukes from [facility]. Who states patient was sent for increased AMS and as well as not eating normally. DAVY dukes also spoke with Dr Cheek

## 2021-03-22 ENCOUNTER — VIRTUAL VISIT (OUTPATIENT)
Dept: OBGYN | Facility: CLINIC | Age: 31
End: 2021-03-22
Payer: COMMERCIAL

## 2021-03-22 DIAGNOSIS — Z97.5 IUD (INTRAUTERINE DEVICE) IN PLACE: ICD-10-CM

## 2021-03-22 PROCEDURE — 99213 OFFICE O/P EST LOW 20 MIN: CPT | Mod: TEL | Performed by: OBSTETRICS & GYNECOLOGY

## 2021-03-22 NOTE — PROGRESS NOTES
Vin is a 30 year old who is being evaluated via a billable telephone visit.      What phone number would you like to be contacted at? [phone]  How would you like to obtain your AVS? Jose Elias    OB/GYN      NAME:  Vin Branham  PCP:  [facility]  MRN:  [id number]    Impression / Plan     30 year old  with:      ICD-10-CM    1. Endometriosis of pelvis  N80.3    2. IUD (intrauterine device) in place  Z97.5      Patient desires permanent sterilization.  We discussed the risks benefits and alternatives to sterilization.  The IUD is helping control the symptoms of endometriosis.  She would plan to keep that in place.  Sterilization offers minimal benefit over the IUD and carries the risk of surgery.  Patient will think about her options and let me know how she would like to proceed.  She will send me a [employer] message if she would like the laparoscopic bilateral salpingectomy.  Procedure discussed in detail.  She is doing well with the spironolactone and will continue that as well.      Chief Complaint     Chief Complaint   Patient presents with     Consult       HPI     Vin Branham is a  30 year old female who is seen via phone visit to discuss permanent sterilization.  Kyleena IUD in place.    She has significantly less pain with the IUD.  Kyleena is working as well as the Mirena did for her.  Menses is every 2.5 to 3 weeks now but very light.   She will plan to continue the IUD for management of endometriosis symptoms if she were to have laparoscopic sterilization.    She is also considering laparoscopic sterilization in [place] or [place] where she has family to support her.    No LMP recorded. (Menstrual status: IUD).         Problem List     Patient Active Problem List    Diagnosis Date Noted     Diarrhea, unspecified type 2021     Priority: Medium     Abdominal bloating 2021     Priority: Medium     Adverse effect of antibiotic, subsequent encounter  01/27/2021     Priority: Medium     Intractable episodic headache, unspecified headache type 08/14/2018     Priority: Medium     IUD (intrauterine device) in place 08/09/2018     Priority: Medium     Kyleena placed 12/9/2020.  Due for removal Dec 2025       Family history of familial hypercholesterolemia 07/11/2018     Priority: Medium     Chronic pelvic pain in female 02/26/2013     Priority: Medium     Ovarian cysts 02/26/2013     Priority: Medium     Problem list name updated by automated process. Provider to review and confirm       Endometriosis of pelvis 02/26/2013     Priority: Medium       Medications     Current Outpatient Medications   Medication     Acetaminophen (TYLENOL PO)     ibuprofen (ADVIL,MOTRIN) 600 MG tablet     Naproxen Sodium (ALEVE PO)     spironolactone (ALDACTONE) 100 MG tablet     levonorgestrel (MIRENA) 20 MCG/24HR IUD     PENICILLIN G SKIN TEST (ANJALI/DIMITRI PROTOCOL) CMPD KIT     No current facility-administered medications for this visit.         Allergies     No Known Allergies    ROS     A  organ review of systems was asked and the pertinent positives and negatives are listed in the HPI.     Physical Exam   Vitals: There were no vitals taken for this visit.    Telephone visit      20 min spent on the date of the encounter in chart review, patient visit,  documentation  about the issues documented above.     Sariah Lazo MD

## 2021-10-02 ENCOUNTER — HEALTH MAINTENANCE LETTER (OUTPATIENT)
Age: 31
End: 2021-10-02

## 2021-11-09 NOTE — PATIENT INSTRUCTIONS
If you have labs or imaging done, the results will automatically release in [employer] without an interpretation.  Your health care professional will review those results and send an interpretation with recommendations as soon as possible, but this may be 1-3 business days.    If you have any questions regarding your visit, please contact your care team.     [employer] [facility]: [phone]  Women s Health CLINIC HOURS TELEPHONE NUMBER       MD Cathy Bourne - JHONATAN Daley-CRAIG Gore-CRAIG Tan-CRAIG Larsen-  Vanita-     Monday- [place]  8:00 a.m - 5:00 p.m    Tuesday- Surgery    Wednesday- Admin    Thursday- [place]  8:00 a.m - 5:00 p.m.    Friday- [place]  7:30 a.m - 4:00 p.m. [facility]  [address]  [place] [address]  [phone] Fax  Imaging [phone]    [facility]  [address] [place] [address] [place] [address]  [phone]    [facility]  [address] [facility] [place], [place] [address] [phone] Fax  Imaging [phone]     Urgent Care locations:    [facility] Monday-Friday  10 am - 8 pm  Saturday and Sunday   9 am - 5 pm  Monday-Friday   10 am - 8 pm  Saturday and Sunday   9 am - 5 pm   [phone]     If you need a medication refill, please contact your pharmacy. Please allow 3 business days for your refill to be completed.    As always, thank you for trusting us with your healthcare needs!      Preventive Health Recommendations  Female Ages 26 - 39  Yearly exam:   See your health care provider every year in order to    Review health changes.     Discuss preventive care.      Review your medicines if you your doctor has prescribed any.    Until age 30: Get a Pap test every three years (more often if you have had an abnormal result).    After age 30: Talk to your doctor about whether you should have a  Pap test every 3 years or have a Pap test with HPV screening every 5 years.   You do not need a Pap test if your uterus was removed (hysterectomy) and you have not had cancer.  You should be tested each year for STDs (sexually transmitted diseases), if you're at risk.   Talk to your provider about how often to have your cholesterol checked.  If you are at risk for diabetes, you should have a diabetes test (fasting glucose).  Shots: Get a flu shot each year. Get a tetanus shot every 10 years.   Nutrition:     Eat at least 5 servings of fruits and vegetables each day.    Eat whole-grain bread, whole-wheat pasta and brown rice instead of white grains and rice.    Get adequate Calcium and Vitamin D.     Lifestyle    Exercise at least 150 minutes a week (30 minutes a day, 5 days of the week). This will help you control your weight and prevent disease.    Limit alcohol to one drink per day.    No smoking.     Wear sunscreen to prevent skin cancer.    See your dentist every six months for an exam and cleaning.

## 2021-11-11 ENCOUNTER — TRANSFERRED RECORDS (OUTPATIENT)
Dept: HEALTH INFORMATION MANAGEMENT | Facility: CLINIC | Age: 31
End: 2021-11-11

## 2021-11-11 ENCOUNTER — OFFICE VISIT (OUTPATIENT)
Dept: OBGYN | Facility: OTHER | Age: 31
End: 2021-11-11
Payer: COMMERCIAL

## 2021-11-11 VITALS
HEART RATE: 86 BPM | HEIGHT: 67 IN | OXYGEN SATURATION: 100 % | SYSTOLIC BLOOD PRESSURE: 102 MMHG | WEIGHT: 173.5 LBS | DIASTOLIC BLOOD PRESSURE: 62 MMHG | BODY MASS INDEX: 27.23 KG/M2

## 2021-11-11 DIAGNOSIS — Z79.899 LONG TERM CURRENT USE OF DIURETIC: ICD-10-CM

## 2021-11-11 DIAGNOSIS — Z01.419 WELL FEMALE EXAM WITH ROUTINE GYNECOLOGICAL EXAM: Primary | ICD-10-CM

## 2021-11-11 DIAGNOSIS — E78.00 ELEVATED LDL CHOLESTEROL LEVEL: ICD-10-CM

## 2021-11-11 LAB
ANION GAP SERPL CALCULATED.3IONS-SCNC: 2 MMOL/L (ref 3–14)
BUN SERPL-MCNC: 11 MG/DL (ref 7–30)
CALCIUM SERPL-MCNC: 8.9 MG/DL (ref 8.5–10.1)
CHLORIDE BLD-SCNC: 105 MMOL/L (ref 94–109)
CHOLEST SERPL-MCNC: 208 MG/DL
CO2 SERPL-SCNC: 29 MMOL/L (ref 20–32)
CREAT SERPL-MCNC: 0.78 MG/DL (ref 0.52–1.04)
FASTING STATUS PATIENT QL REPORTED: YES
GFR SERPL CREATININE-BSD FRML MDRD: >90 ML/MIN/1.73M2
GLUCOSE BLD-MCNC: 91 MG/DL (ref 70–99)
HDLC SERPL-MCNC: 63 MG/DL
LDLC SERPL CALC-MCNC: 122 MG/DL
NONHDLC SERPL-MCNC: 145 MG/DL
POTASSIUM BLD-SCNC: 3.4 MMOL/L (ref 3.4–5.3)
SODIUM SERPL-SCNC: 136 MMOL/L (ref 133–144)
TRIGL SERPL-MCNC: 116 MG/DL

## 2021-11-11 PROCEDURE — 80048 BASIC METABOLIC PNL TOTAL CA: CPT | Performed by: OBSTETRICS & GYNECOLOGY

## 2021-11-11 PROCEDURE — 99395 PREV VISIT EST AGE 18-39: CPT | Performed by: OBSTETRICS & GYNECOLOGY

## 2021-11-11 PROCEDURE — 80061 LIPID PANEL: CPT | Performed by: OBSTETRICS & GYNECOLOGY

## 2021-11-11 PROCEDURE — 36415 COLL VENOUS BLD VENIPUNCTURE: CPT | Performed by: OBSTETRICS & GYNECOLOGY

## 2021-11-11 ASSESSMENT — MIFFLIN-ST. JEOR: SCORE: 1528.58

## 2021-11-11 NOTE — PROGRESS NOTES
SUBJECTIVE:   CC: Vin Branham is an 31 year old woman who presents for preventive health visit.       Patient has been advised of split billing requirements and indicates understanding: Yes  Healthy Habits:    Getting at least 3 servings of Calcium per day:  Yes    Bi-annual eye exam:  Yes    Dental care twice a year:  Yes    Sleep apnea or symptoms of sleep apnea:  Daytime drowsiness    Diet:  Gluten-free/reduced and Other    Frequency of exercise:  1 day/week    Duration of exercise:  30-45 minutes    Taking medications regularly:  Yes    Medication side effects:  None    PHQ-2 Total Score:    Additional concerns today:  No        - No history of GDM or GHTN.   Contraception - tubal .   History of endometriosis - managed with kyleena, which is working better than the mirena.   History of elevated LDL    Today's PHQ-2 Score:   PHQ-2 (  [employer]) 2021   Q1: Little interest or pleasure in doing things 0   Q2: Feeling down, depressed or hopeless 0   PHQ-2 Score 0   Q1: Little interest or pleasure in doing things Not at all   Q2: Feeling down, depressed or hopeless Not at all   PHQ-2 Score 0       Abuse: Current or Past (Physical, Sexual or Emotional) - No  Do you feel safe in your environment? Yes      Social History     Tobacco Use     Smoking status: Former Smoker     Types: Cigarettes     Smokeless tobacco: Never Used   Substance Use Topics     Alcohol use: Yes     If you drink alcohol do you typically have >3 drinks per day or >7 drinks per week? No    Alcohol Use 2021   Prescreen: >3 drinks/day or >7 drinks/week? Not Applicable   Prescreen: >3 drinks/day or >7 drinks/week? -   }  BP Readings from Last 3 Encounters:   21 102/62   21 120/75   21 103/70    Wt Readings from Last 3 Encounters:   21 78.7 kg (173 lb 8 oz)   21 76.2 kg (168 lb)   21 76.2 kg (168 lb)                  Patient Active Problem List   Diagnosis     Chronic pelvic pain in female      Ovarian cysts     Endometriosis of pelvis     Family history of familial hypercholesterolemia     IUD (intrauterine device) in place     Intractable episodic headache, unspecified headache type     Diarrhea, unspecified type     Abdominal bloating     Adverse effect of antibiotic, subsequent encounter     Past Surgical History:   Procedure Laterality Date     APPENDECTOMY  2010     DAVINCI ASSISTED ABLATION / EXCISION OF ENDOMETRIOSIS  12/06/2013    Dr. Tolliver of [facility] at [facility]     ESOPHAGOSCOPY, GASTROSCOPY, DUODENOSCOPY (EGD), COMBINED  04/16/2013    Procedure: COMBINED ESOPHAGOSCOPY, GASTROSCOPY, DUODENOSCOPY (EGD);  EGD  Nausea   pkt given in clinic  [facility];  Surgeon: Oliver Del Cid MD;  Location: [facility]     GYN SURGERY  2011, 2012    cystectomy     TUBAL LIGATION  05/2021       Social History     Tobacco Use     Smoking status: Former Smoker     Types: Cigarettes     Smokeless tobacco: Never Used   Substance Use Topics     Alcohol use: Yes     Family History   Problem Relation Age of Onset     Hyperlipidemia Mother      Endometriosis Mother      Endometriosis Maternal Grandmother      Hyperlipidemia Maternal Grandfather      Diabetes Type 2  Paternal Grandmother      Cancer Paternal Grandmother      Prostate Cancer Paternal Grandfather          Current Outpatient Medications   Medication Sig Dispense Refill     Acetaminophen (TYLENOL PO) Take  by mouth.       ibuprofen (ADVIL,MOTRIN) 600 MG tablet Take 1 tablet by mouth every 6 hours as needed for pain. 90 tablet 3     levonorgestrel (KYLEENA) 19.5 MG IUD 1 each by Intrauterine route once       Naproxen Sodium (ALEVE PO)        spironolactone (ALDACTONE) 100 MG tablet Take 1 tablet (100 mg) by mouth 2 times daily 180 tablet 3     No Known Allergies  Recent Labs   Lab Test 01/28/21  0855 08/29/19  0738 07/11/18  0803   * 166* 153*   HDL 66 66 74   TRIG 106 52 84   CR 0.80  --  0.84   GFRESTIMATED >90  --  80   GFRESTBLACK >90   "--  >90   POTASSIUM 4.0  --  4.0          History of abnormal Pap smear: NO - age 30-65 PAP every 5 years with negative HPV co-testing recommended  PAP / HPV Latest Ref Rng & Units 8/9/2018 4/15/2013   PAP (Historical) - NIL NIL   HPV16 NEG:Negative Negative -   HPV18 NEG:Negative Negative -   HRHPV NEG:Negative Negative -       Review of Systems  CONSTITUTIONAL: NEGATIVE for fever, chills, change in weight  INTEGUMENTARU/SKIN: NEGATIVE for worrisome rashes, moles or lesions  EYES: NEGATIVE for vision changes or irritation  ENT: NEGATIVE for ear, mouth and throat problems  RESP: NEGATIVE for significant cough or SOB  BREAST: NEGATIVE for masses, tenderness or discharge  CV: NEGATIVE for chest pain, palpitations or peripheral edema  GI: NEGATIVE for nausea, abdominal pain, heartburn, or change in bowel habits  : NEGATIVE for unusual urinary or vaginal symptoms. Periods are regular.   MUSCULOSKELETAL: NEGATIVE for significant arthralgias or myalgia  NEURO: NEGATIVE for weakness, dizziness or paresthesias  PSYCHIATRIC: NEGATIVE for changes in mood or affect     OBJECTIVE:   /62 (BP Location: Right arm, Cuff Size: Adult Regular)   Pulse 86   Ht 1.708 m (5' 7.25\")   Wt 78.7 kg (173 lb 8 oz)   SpO2 100%   BMI 26.97 kg/m    Physical Exam  Gen: Alert and oriented times 3, no acute distress.  Well developed, well nourished, pleasant.    Neck: Supple, no masses.  No thyromegaly.  Breast: Symmetrical without lesions.  No dimpling, nipple discharge, or discrete masses.  No lymphadenopathy.  Chest:  Non labored.  Clear to auscultation bilaterally.    Heart: Regular, normal S1, S2.  No murmurs.   Abdomen: Soft, nontender, nondistended.  No hepatosplenomegaly.    :  Normal female external genitalia.  No lesions.  Urethral meatus normal.  Speculum exam reveals a normal vaginal vault, normal cervix .   No abnormal discharge.  Bimanual exam reveals a normal, mobile, nontender uterus.  No cervical motion tenderness.  " "Adnexa nontender with no palpable masses.    Extremities:  Nontender, no edema.      ASSESSMENT/PLAN:       ICD-10-CM    1. Well female exam with routine gynecological exam  Z01.419 CANCELED: Glucose   2. Elevated LDL cholesterol level  E78.00 Lipid panel reflex to direct LDL Fasting   3. Long term current use of diuretic  Z79.899 Basic metabolic panel  (Ca, Cl, CO2, Creat, Gluc, K, Na, BUN)       Plan to refer to family medicine if her LDL continues to be elevated.  Continue Kyleena for endometriosis management.  She will let me know if she needs a refill of her spironolactone.  EMILY for tubal records obtained    Patient has been advised of split billing requirements and indicates understanding: Yes  COUNSELING:  Reviewed preventive health counseling, as reflected in patient instructions    Estimated body mass index is 26.97 kg/m  as calculated from the following:    Height as of this encounter: 1.708 m (5' 7.25\").    Weight as of this encounter: 78.7 kg (173 lb 8 oz).        She reports that she has quit smoking. Her smoking use included cigarettes. She has never used smokeless tobacco.      Counseling Resources:  ATP IV Guidelines  Pooled Cohorts Equation Calculator  Breast Cancer Risk Calculator  BRCA-Related Cancer Risk Assessment: FHS-7 Tool  FRAX Risk Assessment  ICSI Preventive Guidelines  Dietary Guidelines for Americans, 2010  USDA's MyPlate  ASA Prophylaxis  Lung CA Screening    Sariah Lazo MD  [facility]  "

## 2022-03-08 DIAGNOSIS — L70.9 ACNE, UNSPECIFIED ACNE TYPE: ICD-10-CM

## 2022-03-08 RX ORDER — SPIRONOLACTONE 100 MG/1
100 TABLET, FILM COATED ORAL 2 TIMES DAILY
Qty: 180 TABLET | Refills: 2 | Status: SHIPPED | OUTPATIENT
Start: 2022-03-08 | End: 2022-11-03

## 2022-09-10 DIAGNOSIS — L70.9 ACNE, UNSPECIFIED ACNE TYPE: ICD-10-CM

## 2022-11-03 ENCOUNTER — OFFICE VISIT (OUTPATIENT)
Dept: OBGYN | Facility: OTHER | Age: 32
End: 2022-11-03
Payer: COMMERCIAL

## 2022-11-03 VITALS
SYSTOLIC BLOOD PRESSURE: 121 MMHG | BODY MASS INDEX: 30.49 KG/M2 | WEIGHT: 194.3 LBS | HEIGHT: 67 IN | HEART RATE: 80 BPM | DIASTOLIC BLOOD PRESSURE: 82 MMHG

## 2022-11-03 DIAGNOSIS — Z13.6 CARDIOVASCULAR SCREENING; LDL GOAL LESS THAN 160: ICD-10-CM

## 2022-11-03 DIAGNOSIS — Z83.49 FAMILY HISTORY OF THYROID DISEASE: ICD-10-CM

## 2022-11-03 DIAGNOSIS — Z01.419 WELL FEMALE EXAM WITH ROUTINE GYNECOLOGICAL EXAM: Primary | ICD-10-CM

## 2022-11-03 DIAGNOSIS — Z79.899 LONG TERM CURRENT USE OF DIURETIC: ICD-10-CM

## 2022-11-03 DIAGNOSIS — Z97.5 IUD (INTRAUTERINE DEVICE) IN PLACE: ICD-10-CM

## 2022-11-03 DIAGNOSIS — Z28.21 INFLUENZA VACCINATION DECLINED: ICD-10-CM

## 2022-11-03 PROCEDURE — 84443 ASSAY THYROID STIM HORMONE: CPT | Performed by: OBSTETRICS & GYNECOLOGY

## 2022-11-03 PROCEDURE — 80048 BASIC METABOLIC PNL TOTAL CA: CPT | Performed by: OBSTETRICS & GYNECOLOGY

## 2022-11-03 PROCEDURE — 36415 COLL VENOUS BLD VENIPUNCTURE: CPT | Performed by: OBSTETRICS & GYNECOLOGY

## 2022-11-03 PROCEDURE — 99395 PREV VISIT EST AGE 18-39: CPT | Performed by: OBSTETRICS & GYNECOLOGY

## 2022-11-03 RX ORDER — SPIRONOLACTONE 100 MG/1
100 TABLET, FILM COATED ORAL 2 TIMES DAILY
Qty: 180 TABLET | Refills: 3 | Status: SHIPPED | OUTPATIENT
Start: 2022-11-03 | End: 2023-09-14

## 2022-11-03 ASSESSMENT — ENCOUNTER SYMPTOMS
NERVOUS/ANXIOUS: 0
CONSTIPATION: 0
HEARTBURN: 0
DIZZINESS: 0
BREAST MASS: 0
NAUSEA: 0
PALPITATIONS: 0
FEVER: 0
EYE PAIN: 0
HEMATOCHEZIA: 0
HEADACHES: 0
ARTHRALGIAS: 0
PARESTHESIAS: 0
DYSURIA: 0
DIARRHEA: 0
HEMATURIA: 0
MYALGIAS: 0
JOINT SWELLING: 0
FREQUENCY: 0
COUGH: 0
WEAKNESS: 0
SHORTNESS OF BREATH: 0
CHILLS: 0
ABDOMINAL PAIN: 0
SORE THROAT: 0

## 2022-11-03 NOTE — PROGRESS NOTES
"   SUBJECTIVE:   CC: Vin is an 32 year old who presents for preventive health visit.       Patient has been advised of split billing requirements and indicates understanding: Yes  Healthy Habits:     Getting at least 3 servings of Calcium per day:  NO    Bi-annual eye exam:  Yes    Dental care twice a year:  Yes    Sleep apnea or symptoms of sleep apnea:  Daytime drowsiness    Diet:  Gluten-free/reduced    Frequency of exercise:  2-3 days/week    Duration of exercise:  45-60 minutes    Taking medications regularly:  No    Barriers to taking medications:  Problems remembering to take them    Medication side effects:  None    PHQ-2 Total Score: 0    Additional concerns today:  Yes        - No history of GDM or GHTN.   Contraception - tubal .   History of endometriosis - managed with kyleena, which is working better than the mirena. Op note reviewed after her last visit - \"Extensive ANKITA to gain access to the peritoneal cavity and left adnexa.  Partial salpingectomy bilaterally... adhesions in culdesac.  Bowel adhesion to left adnexal.\"  History of elevated LDL.  Has been normal the last couple of years.    BMP is done to check K for spironolactone refills      Today's PHQ-2 Score:   PHQ-2 (  [employer]) 11/3/2022   Q1: Little interest or pleasure in doing things 0   Q2: Feeling down, depressed or hopeless 0   PHQ-2 Score 0   PHQ-2 Total Score (12-17 Years)- Positive if 3 or more points; Administer PHQ-A if positive -   Q1: Little interest or pleasure in doing things Not at all   Q2: Feeling down, depressed or hopeless Not at all   PHQ-2 Score 0       Abuse: Current or Past (Physical, Sexual or Emotional) - No  Do you feel safe in your environment? Yes        Social History     Tobacco Use     Smoking status: Former     Types: Cigarettes     Smokeless tobacco: Never   Substance Use Topics     Alcohol use: Yes     If you drink alcohol do you typically have >3 drinks per day or >7 drinks per week? No    Alcohol " Use 11/3/2022   Prescreen: >3 drinks/day or >7 drinks/week? No   Prescreen: >3 drinks/day or >7 drinks/week? -   No flowsheet data found.      BP Readings from Last 3 Encounters:   11/03/22 121/82   11/11/21 102/62   02/24/21 120/75    Wt Readings from Last 3 Encounters:   11/03/22 88.1 kg (194 lb 4.8 oz)   11/11/21 78.7 kg (173 lb 8 oz)   02/24/21 76.2 kg (168 lb)                  Patient Active Problem List   Diagnosis     Chronic pelvic pain in female     Ovarian cysts     Endometriosis of pelvis     Family history of familial hypercholesterolemia     IUD (intrauterine device) in place     Intractable episodic headache, unspecified headache type     Diarrhea, unspecified type     Abdominal bloating     Adverse effect of antibiotic, subsequent encounter     Past Surgical History:   Procedure Laterality Date     APPENDECTOMY  2010     DAVINCI ASSISTED ABLATION / EXCISION OF ENDOMETRIOSIS  12/06/2013    Dr. Tolliver of [facility]     ESOPHAGOSCOPY, GASTROSCOPY, DUODENOSCOPY (EGD), COMBINED  04/16/2013    Procedure: COMBINED ESOPHAGOSCOPY, GASTROSCOPY, DUODENOSCOPY (EGD);  EGD  Nausea   pkt given in clinic  [facility];  Surgeon: Oliver Del Cid MD;  Location: [facility] OR     GYN SURGERY  2011, 2012    cystectomy     TUBAL LIGATION  05/2021       Social History     Tobacco Use     Smoking status: Former     Types: Cigarettes     Smokeless tobacco: Never   Substance Use Topics     Alcohol use: Yes     Family History   Problem Relation Age of Onset     Hyperlipidemia Mother      Endometriosis Mother      Endometriosis Maternal Grandmother      Hyperlipidemia Maternal Grandfather      Diabetes Type 2  Paternal Grandmother      Cancer Paternal Grandmother      Prostate Cancer Paternal Grandfather          Current Outpatient Medications   Medication Sig Dispense Refill     Acetaminophen (TYLENOL PO) Take  by mouth.       ibuprofen (ADVIL,MOTRIN) 600 MG tablet Take 1 tablet by mouth every 6 hours as needed  for pain. 90 tablet 3     levonorgestrel (KYLEENA) 19.5 MG IUD 1 each by Intrauterine route once       Naproxen Sodium (ALEVE PO)        spironolactone (ALDACTONE) 100 MG tablet Take 1 tablet (100 mg) by mouth 2 times daily 180 tablet 3     No Known Allergies  Recent Labs   Lab Test 11/11/21  0937 01/28/21  0855 08/29/19  0738 07/11/18  0803   * 121* 166* 153*   HDL 63 66 66 74   TRIG 116 106 52 84   CR 0.78 0.80  --  0.84   GFRESTIMATED >90 >90  --  80   GFRESTBLACK  --  >90  --  >90   POTASSIUM 3.4 4.0  --  4.0        Breast Cancer Screening:  Any new diagnosis of family breast, ovarian, or bowel cancer? No    FHS-7: No flowsheet data found.    Patient under 40 years of age: Routine Mammogram Screening not recommended.       History of abnormal Pap smear: NO - age 30-65 PAP every 5 years with negative HPV co-testing recommended  PAP / HPV Latest Ref Rng & Units 8/9/2018 4/15/2013   PAP (Historical) - NIL NIL   HPV16 NEG:Negative Negative -   HPV18 NEG:Negative Negative -   HRHPV NEG:Negative Negative -       Review of Systems   Constitutional: Negative for chills and fever.   HENT: Negative for congestion, ear pain, hearing loss and sore throat.    Eyes: Negative for pain and visual disturbance.   Respiratory: Negative for cough and shortness of breath.    Cardiovascular: Negative for chest pain, palpitations and peripheral edema.   Gastrointestinal: Negative for abdominal pain, constipation, diarrhea, heartburn, hematochezia and nausea.   Breasts:  Negative for tenderness, breast mass and discharge.   Genitourinary: Negative for dysuria, frequency, genital sores, hematuria, pelvic pain, urgency and vaginal discharge.   Musculoskeletal: Negative for arthralgias, joint swelling and myalgias.   Skin: Negative for rash.   Neurological: Negative for dizziness, weakness, headaches and paresthesias.   Psychiatric/Behavioral: Negative for mood changes. The patient is not nervous/anxious.       OBJECTIVE:   BP  "121/82 (BP Location: Right arm, Cuff Size: Adult Regular)   Pulse 80   Ht 1.702 m (5' 7\")   Wt 88.1 kg (194 lb 4.8 oz)   LMP 10/15/2022 (Approximate)   BMI 30.43 kg/m    Physical Exam  Gen: Alert and oriented times 3, no acute distress.  Well developed, well nourished, pleasant.    Neck: Supple, no masses.  No thyromegaly.  Breast: Symmetrical without lesions.  No dimpling, nipple discharge, or discrete masses.  No lymphadenopathy.  Chest:  Non labored.  Clear to auscultation bilaterally.    Heart: Regular, normal S1, S2.  No murmurs.   Abdomen: Soft, nontender, nondistended.  No hepatosplenomegaly.    :  Normal female external genitalia.  No lesions.  Urethral meatus normal.  Speculum exam reveals a normal vaginal vault, normal cervix.  No abnormal discharge.  Bimanual exam reveals a normal, mobile, nontender uterus .  No cervical motion tenderness.  Adnexa nontender with no palpable masses.    Extremities:  Nontender, no edema.       ASSESSMENT/PLAN:       ICD-10-CM    1. Well female exam with routine gynecological exam  Z01.419 CANCELED: Glucose      2. CARDIOVASCULAR SCREENING; LDL GOAL LESS THAN 160  Z13.6 Lipid panel reflex to direct LDL Fasting     Lipid panel reflex to direct LDL Fasting      3. Long term current use of diuretic  Z79.899 Basic metabolic panel  (Ca, Cl, CO2, Creat, Gluc, K, Na, BUN)     Basic metabolic panel  (Ca, Cl, CO2, Creat, Gluc, K, Na, BUN)     Basic metabolic panel  (Ca, Cl, CO2, Creat, Gluc, K, Na, BUN)     CANCELED: Basic metabolic panel  (Ca, Cl, CO2, Creat, Gluc, K, Na, BUN)      4. Family history of thyroid disease  Z83.49 TSH with free T4 reflex     TSH with free T4 reflex      5. Influenza vaccination declined  Z28.21       6. IUD (intrauterine device) in place  Z97.5             Continues to alternate between ibuprofen and aleve for pelvic pain. Endometriosis managed with Kyleena.  Plan lipids next year.         COUNSELING:  Reviewed preventive health counseling, as " "reflected in patient instructions    Estimated body mass index is 30.43 kg/m  as calculated from the following:    Height as of this encounter: 1.702 m (5' 7\").    Weight as of this encounter: 88.1 kg (194 lb 4.8 oz).        She reports that she has quit smoking. Her smoking use included cigarettes. She has never used smokeless tobacco.      Counseling Resources:  ATP IV Guidelines  Pooled Cohorts Equation Calculator  Breast Cancer Risk Calculator  BRCA-Related Cancer Risk Assessment: FHS-7 Tool  FRAX Risk Assessment  ICSI Preventive Guidelines  Dietary Guidelines for Americans, 2010  USDA's MyPlate  ASA Prophylaxis  Lung CA Screening    Sariah Lazo MD  [facility]  "

## 2022-11-04 LAB
ANION GAP SERPL CALCULATED.3IONS-SCNC: 6 MMOL/L (ref 3–14)
BUN SERPL-MCNC: 14 MG/DL (ref 7–30)
CALCIUM SERPL-MCNC: 9.1 MG/DL (ref 8.5–10.1)
CHLORIDE BLD-SCNC: 102 MMOL/L (ref 94–109)
CO2 SERPL-SCNC: 28 MMOL/L (ref 20–32)
CREAT SERPL-MCNC: 0.74 MG/DL (ref 0.52–1.04)
GFR SERPL CREATININE-BSD FRML MDRD: >90 ML/MIN/1.73M2
GLUCOSE BLD-MCNC: 103 MG/DL (ref 70–99)
POTASSIUM BLD-SCNC: 4 MMOL/L (ref 3.4–5.3)
SODIUM SERPL-SCNC: 136 MMOL/L (ref 133–144)
TSH SERPL DL<=0.005 MIU/L-ACNC: 0.9 MU/L (ref 0.4–4)

## 2023-01-14 ENCOUNTER — HEALTH MAINTENANCE LETTER (OUTPATIENT)
Age: 33
End: 2023-01-14

## 2023-02-27 ENCOUNTER — E-VISIT (OUTPATIENT)
Dept: URGENT CARE | Facility: CLINIC | Age: 33
End: 2023-02-27
Payer: COMMERCIAL

## 2023-02-27 DIAGNOSIS — L70.0 ACNE VULGARIS: Primary | ICD-10-CM

## 2023-02-27 PROCEDURE — 99421 OL DIG E/M SVC 5-10 MIN: CPT | Performed by: EMERGENCY MEDICINE

## 2023-02-27 RX ORDER — TRETINOIN 0.1 MG/G
GEL TOPICAL AT BEDTIME
Qty: 45 G | Refills: 2 | Status: SHIPPED | OUTPATIENT
Start: 2023-02-27 | End: 2023-09-14

## 2023-06-29 ENCOUNTER — OFFICE VISIT (OUTPATIENT)
Dept: FAMILY MEDICINE | Facility: OTHER | Age: 33
End: 2023-06-29
Payer: COMMERCIAL

## 2023-06-29 VITALS
HEIGHT: 67 IN | RESPIRATION RATE: 18 BRPM | DIASTOLIC BLOOD PRESSURE: 78 MMHG | TEMPERATURE: 98 F | BODY MASS INDEX: 32.18 KG/M2 | SYSTOLIC BLOOD PRESSURE: 114 MMHG | OXYGEN SATURATION: 97 % | HEART RATE: 85 BPM | WEIGHT: 205 LBS

## 2023-06-29 DIAGNOSIS — F50.819 BINGE EATING DISORDER: Primary | ICD-10-CM

## 2023-06-29 DIAGNOSIS — Z12.4 CERVICAL CANCER SCREENING: ICD-10-CM

## 2023-06-29 DIAGNOSIS — F41.1 GAD (GENERALIZED ANXIETY DISORDER): ICD-10-CM

## 2023-06-29 PROCEDURE — 99203 OFFICE O/P NEW LOW 30 MIN: CPT | Performed by: PHYSICIAN ASSISTANT

## 2023-06-29 NOTE — PROGRESS NOTES
"  Assessment & Plan     Binge eating disorder  Patient has a history of about 20 or more years worth of overeating.  She does not purge just needs to excess.  She does a lot of running to be able to maintain her weight but has been slowly gaining weight as she says \"I can no longer run my eating\" she has had some therapies in the past for anxiety but not much for anything that expressly addresses eating disorders.  Advised mental health consult and nutrition consult and following up with me in 2 to 3 weeks to see where she is overall plan.  We did talk about Gregoria and like treatment tenderness but I do not feel that she is to that point yet.  Evaluation based on her response to consults.  - Adult Mental Health  Referral; Future  - Nutrition Referral; Future    VENITA (generalized anxiety disorder)  See above  - Adult Mental Health  Referral; Future  - Nutrition Referral; Future    Cervical cancer screening  Has a OB/GYN appointment pending.  Follow-up with them as directed.     BMI:   Estimated body mass index is 32.1 kg/m  as calculated from the following:    Height as of this encounter: 1.702 m (5' 7.01\").    Weight as of this encounter: 93 kg (205 lb).   Weight management plan: Discussed healthy diet and exercise guidelines    Work on weight loss  Regular exercise  Juan Riley PA-C  [facility] NICKI Banuelos is a 33 year old, presenting for the following health issues:  Eating Disorder        6/29/2023     3:14 PM   Additional Questions   Roomed by Ana MONTANA   Accompanied by self     Eating Disorder    History of Present Illness       Reason for visit:  Binge eating  Symptom onset:  More than a month  Symptoms include:  I can no longer out-exercise my eating disorder  Symptom intensity:  Moderate  Symptom progression:  Worsening  Had these symptoms before:  Yes  Has tried/received treatment for these symptoms:  Yes  Previous treatment was successful:  No  What " "makes it better:  Days that I don't binge    She eats 4 or more servings of fruits and vegetables daily.She consumes 0 sweetened beverage(s) daily.She exercises with enough effort to increase her heart rate 60 or more minutes per day.  She exercises with enough effort to increase her heart rate 5 days per week.   She is taking medications regularly.          Constitutional, HEENT, cardiovascular, pulmonary, GI, , musculoskeletal, neuro, skin, endocrine and psych systems are negative, except as otherwise noted.      Objective    /78   Pulse 85   Temp 98  F (36.7  C) (Temporal)   Resp 18   Ht 1.702 m (5' 7.01\")   Wt 93 kg (205 lb)   SpO2 97%   BMI 32.10 kg/m    Body mass index is 32.1 kg/m .  Physical Exam   GENERAL: healthy, alert and no distress  NECK: no adenopathy, no asymmetry, masses, or scars and thyroid normal to palpation  RESP: lungs clear to auscultation - no rales, rhonchi or wheezes  CV: regular rate and rhythm, normal S1 S2, no S3 or S4, no murmur, click or rub, no peripheral edema and peripheral pulses strong  ABDOMEN: soft, nontender, no hepatosplenomegaly, no masses and bowel sounds normal  MS: no gross musculoskeletal defects noted, no edema  NEURO:  DTRs are normal and symmetric throughout.  Cranial nerves 2-12 grossly intact.  Sensation intact to light touch.  Psychological:   Negative for memory, mood or flight of ideas at this time with appropriate affect.  Good recall of date, time and place.  Some sad affect with tears is noted that she discusses her overall state.    No results found for this or any previous visit (from the past 24 hour(s)).                "

## 2023-08-23 ENCOUNTER — HOSPITAL ENCOUNTER (OUTPATIENT)
Dept: NUTRITION | Facility: CLINIC | Age: 33
Discharge: HOME | End: 2023-08-23
Admitting: DIETITIAN, REGISTERED
Payer: COMMERCIAL

## 2023-08-23 DIAGNOSIS — F50.819 BINGE EATING DISORDER: ICD-10-CM

## 2023-08-23 DIAGNOSIS — F41.1 GAD (GENERALIZED ANXIETY DISORDER): ICD-10-CM

## 2023-08-23 PROCEDURE — 97802 MEDICAL NUTRITION INDIV IN: CPT | Mod: GT,95 | Performed by: DIETITIAN, REGISTERED

## 2023-08-23 NOTE — PROGRESS NOTES
Virtual Visit Details    Type of service:  Video Visit     Originating Location (pt. Location): Home    Distant Location (provider location):  On-site  Platform used for Video Visit: [facility]     OUTPATIENT NUTRITION ASSESSMENT   REASON FOR ASSESSMENT  Vin Unger referred by Juan Clark PA-C for MNT related to   Binge eating disorder [F50.81]  - Primary      VENITA (generalized anxiety disorder) [F41.1]      .    Patient accompanied by self      ASSESSMENT   Nutrition History:  - Information obtained from patient.  - Patient is on a regular diet at home.  Patient has struggled with food since childhood.  Patient lives with partner and 14 year old son.  Patient's struggle with food has increased in the past 2 years.      Compensatory behaviors -exercise and restriction   Explored options of eating 2 years ago when was in CBT and DBT but felt like to much at that rema    10 years old -mom with yoyo dieting   15-16 years old cookout -was told by friend's mother -you need to slow down as would eat (2nd, 3rd and 4th and 5th portions).  Told parent would then restrict.  Friend's parent said patient likely has ED.      Health and weight impacted decision to address binge eating. Patient liked to go to buffets so could binge but Covid shutdowns no longer allowed binge eating so it became more expensive to binge     Last binge 2 days ago. Patient states over cooks so can over eat.  Patient eats until uncomfortably full.     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)    Sleeps 6 AM-12-1 PM during the summer   School sleeps 7:45-1:50 as drops son off at school     70-75% of day thinking about food   Thinks about body once eats a lot  Does not weigh   No purging, no laxatives   Diet pills -hydroxy cut -years ago   Stimulants pre-workout for the past 2 years thermogenics -150 mg caffeine     Sitting at table with family to eat first meal  Sitting at work desk for lunch   Eats rapidly 15 minutes   Wasn't allowed snack foods as kid   Only  "ate at designated eating times -didn't get to choose foods and sat at table until completed meal   Eats until 80% full or full with first meal s  Stuffed at 12 PM meal     Protein bar-the sweet     Fasting every other day -walking dog 1-2 miles (every other day) family get together will each past 2 years     Diet Recall:  Breakfast: none    Lunch: 12:00-12:30 anything in the fridge -burrito (rice, black beans, shredded chicken) wrapped in burrito tortilla with guacamole; states size of loaf bread    Dinner: pasta-karlie and lasgana; rice and chicken; salad (spinach, green pepper, tomato, boiled eggs) 1 head iceberg + olives; eggs favorite (5:00-6:00 PM)   Snack: popcorn; pear or hard boiled egg  Beverages: coffee 6-8 cups per day (just likes does not use for energy)   Dining out: varies          Exercise: Every other day-exercises 2 times per day; while working will do lunges and push ups   2-3 hours in AM or 90 minutes in the afternoon + adding movement in tasks (patient has been using exercise as way to burn calories for the past 2 years)     NUTRITION FOCUSED PHYSICAL ASSESSMENT (NFPA) FOR DIAGNOSING MALNUTRITION  Yes         Observed:   No nutrition-related physical findings observed    Obtained from Chart/Interdisciplinary Team:  None noted     LABS  Labs reviewed    MEDICATIONS  Medications reviewed    ANTHROPOMETRICS   Height: 5'7\"  Weight: 205 lbs   BMI (kg/m2): 32.1 kg/m2   Weight Status:  Obesity Grade I BMI 30-34.9  %IBW: 151%  Weight History:   Wt Readings from Last 10 Encounters:   06/29/23 93 kg (205 lb)   11/03/22 88.1 kg (194 lb 4.8 oz)   11/11/21 78.7 kg (173 lb 8 oz)   02/24/21 76.2 kg (168 lb)   01/27/21 76.2 kg (168 lb)   12/09/20 76.3 kg (168 lb 4 oz)   08/26/19 71.9 kg (158 lb 8 oz)   09/10/18 71.7 kg (158 lb)   08/14/18 71.7 kg (158 lb)   08/09/18 73.5 kg (162 lb)      ASSESSED NUTRITION NEEDS  Estimated Energy Needs: 8741-0854 kcals/day (14-17 Kcal/Kg)  Justification:  (obese)  Estimated " Protein Needs: 61-73 grams protein/day (1-1.2 g pro/Kg)  Justification:  (preservation of lean body mass)  Estimated Fluid Needs: 0210-7249 mL/day (30-35 mL/kg)    ASSESSED MALNUTRITION STATUS  % Weight Loss:  None noted  % Intake:  No decreased intake noted  Subcutaneous Fat Loss:  None observed  Loss of Muscle Mass:  None observed  Fluid Retention:  None noted    Malnutrition Diagnosis:  Patient does not meet two of the above criteria necessary for diagnosing malnutrition    DIAGNOSIS   Nutrition Diagnosis:  Disordered eating pattern related to preoccupation with food as evidenced by current binge behaviors, restriction and excessive exercise       INTERVENTIONS   Nutrition Prescription   Recommend normalized eating pattern       IMPLEMENTATION   Assessed learning needs and learning preference  Teaching Method(s) used: Explanation  Vitamin and Mineral Supplements: recommend complete multivitamin and mineral   Diet Education:  Provided education on normalized eating pattern   Nutrition Education (Content):   a)  Discussed normalized eating pattern. Encouraged patient to begin eating all meals at the table and not multitask.  Discussed current compensatory behaviors and determined strategies for behavior change.  Suggest gradual changes for long term healthy relationship with food.  Supported patient with her struggle with eating disorder.   Nutrition Education (Application):   a)  Discussed current eating pattern and ways to modify.     b)  Patient verbalizes understanding of diet by stating will eat at table.    Expected patient engagement: good   Other: Patient is not currently working with therapist but has appointment scheduled for evaluation.      GOALS  Reduce exercise to 60 minutes per day   Eat 2nd meal at kitchen table     FOLLOW UP/MONITORING   Progress towards goals will be monitored and evaluated per protocol and Practice Guidelines  Patient to follow up in 2 weeks  RD name and number provided    Time  Spent with Patient  52  minutes     Mike Huddleston, RD, LD  [facility] Dietitian  [phone] (office phone)

## 2023-09-11 ENCOUNTER — HOSPITAL ENCOUNTER (OUTPATIENT)
Dept: NUTRITION | Facility: CLINIC | Age: 33
Discharge: HOME | End: 2023-09-11
Attending: DIETITIAN, REGISTERED | Admitting: DIETITIAN, REGISTERED
Payer: COMMERCIAL

## 2023-09-11 PROCEDURE — 97803 MED NUTRITION INDIV SUBSEQ: CPT | Mod: GT,95 | Performed by: DIETITIAN, REGISTERED

## 2023-09-11 NOTE — PROGRESS NOTES
Virtual Visit Details    Type of service:  Video Visit     Originating Location (pt. Location): Home    Distant Location (provider location):  On-site  Platform used for Video Visit: [employer]     OUTPATIENT NUTRITION ASSESSMENT   REASON FOR ASSESSMENT  Vin Unger referred by Juan Clark PA-C for MNT related to   Binge eating disorder [F50.81]  - Primary      VENITA (generalized anxiety disorder) [F41.1]      .    Patient accompanied by self       ASSESSMENT   Nutrition History:  - Information obtained from patient.  - Patient is on a regular diet at home.  Patient has struggled with food since childhood.  Patient lives with partner and 14 year old son.  Patient's struggle with food has increased in the past 2 years.       Compensatory behaviors -exercise and restriction   Explored options of eating 2 years ago when was in CBT and DBT but felt like too much at that time     10 years old -mom with yoyo dieting   15-16 years old cookout -was told by friend's mother -you need to slow down as would eat (2nd, 3rd and 4th and 5th portions).  Told parent would then restrict.  Friend's parent said patient likely has ED.       Health and weight impacted decision to address binge eating. Patient liked to go to buffets so could binge but Covid shutdowns no longer allowed binge eating so it became more expensive to binge      Last binge 2 days ago. Patient states over cooks so can over eat.  Patient eats until uncomfortably full.      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)     Sleeps 6 AM-12-1 PM during the summer   School sleeps 7:45-1:50 as drops son off at school      70-75% of day thinking about food   Thinks about body once eats a lot  Does not weigh   No purging, no laxatives   Diet pills -hydroxy cut -years ago   Stimulants pre-workout for the past 2 years thermogenics -150 mg caffeine      Sitting at table with family to eat first meal  Sitting at work desk for lunch   Eats rapidly 15 minutes   Wasn't allowed snack foods as  "kid   Only ate at designated eating times -didn't get to choose foods and sat at table until completed meal   Eats until 80% full or full with first meal   Stuffed at 12 PM meal      Protein bar-the sweet      Fasting every other day -walking dog 1-2 miles (every other day) family get together will each past 2 years     9/11/2023 Patient with increased stress with trying to reduce exercise.  Patient reduced 2 hours of intense exercise by switching to walking her puppy.  Patient has not been negotiating calories.  Patient binges every other day.  Patient states binge can happen without intent.  Patient usually binges on meat.  Last binge with making lasagna.  Patient limits chips and cookies in her home.  Patient's biggest concern is whether her insurance will cover appointments.  Patient will be meeting with therapist at the end of the month.        Diet Recall:  Breakfast: none    Lunch: 12:00-12:30 anything in the fridge -burrito (rice, black beans, shredded chicken) wrapped in burrito tortilla with guacamole; states size of loaf bread    Dinner: pasta-karlie and lasgana; rice and chicken; salad (spinach, green pepper, tomato, boiled eggs) 1 head iceberg + olives; eggs favorite (5:00-6:00 PM)   Snack: popcorn; pear or hard boiled egg  Beverages: coffee 6-8 cups per day (just likes does not use for energy)   Dining out: varies           Exercise: Every other day-exercises 2 times per day; while working will do lunges and push ups   2-3 hours in AM or 90 minutes in the afternoon + adding movement in tasks (patient has been using exercise as way to burn calories for the past 2 years)      NUTRITION FOCUSED PHYSICAL ASSESSMENT (NFPA) FOR DIAGNOSING MALNUTRITION  Yes         Observed:   No nutrition-related physical findings observed     Obtained from Chart/Interdisciplinary Team:  None noted      LABS  Labs reviewed     MEDICATIONS  Medications reviewed     ANTHROPOMETRICS   Height: 5'7\"  Weight: 205 lbs   BMI " (kg/m2): 32.1 kg/m2   Weight Status:  Obesity Grade I BMI 30-34.9  %IBW: 151%  Weight History:   Wt Readings from Last 10 Encounters:   06/29/23 93 kg (205 lb)   11/03/22 88.1 kg (194 lb 4.8 oz)   11/11/21 78.7 kg (173 lb 8 oz)   02/24/21 76.2 kg (168 lb)   01/27/21 76.2 kg (168 lb)   12/09/20 76.3 kg (168 lb 4 oz)   08/26/19 71.9 kg (158 lb 8 oz)   09/10/18 71.7 kg (158 lb)   08/14/18 71.7 kg (158 lb)   08/09/18 73.5 kg (162 lb)      ASSESSED NUTRITION NEEDS  Estimated Energy Needs: 9450-5369 kcals/day (14-17 Kcal/Kg)  Justification:  (obese)  Estimated Protein Needs: 61-73 grams protein/day (1-1.2 g pro/Kg)  Justification:  (preservation of lean body mass)  Estimated Fluid Needs: 3235-7714 mL/day (30-35 mL/kg)     ASSESSED MALNUTRITION STATUS  % Weight Loss:  None noted  % Intake:  No decreased intake noted  Subcutaneous Fat Loss:  None observed  Loss of Muscle Mass:  None observed  Fluid Retention:  None noted     Malnutrition Diagnosis:  Patient does not meet two of the above criteria necessary for diagnosing malnutrition    EVALUATION/PROGRESS TOWARDS GOALS  Previous nutrition goals:  Reduce exercise to 60 minutes per day-not met   Eat 2nd meal at kitchen table-improving     Previous nutrition diagnosis: Disordered eating pattern related to preoccupation with food as evidenced by current binge behaviors, restriction and excessive exercise -no change     Current nutrition diagnosis: Disordered eating pattern related to preoccupation with food as evidenced by current binge behaviors, restriction and excessive exercise        INTERVENTIONS   Nutrition Prescription   Recommend normalized eating pattern        IMPLEMENTATION   Assessed learning needs and learning preference  Teaching Method(s) used: Explanation  Vitamin and Mineral Supplements: recommend complete multivitamin and mineral   Diet Education:  Provided education on normalized eating pattern   Nutrition Education (Content):              a)  Discussed  progress towards goals.  Reviewed normalized eating pattern.  Discussed options of ways to reduce binges.  Discussed current compensatory behaviors and determined strategies for behavior change.  Suggest gradual changes for long term healthy relationship with food.  Supported patient with her struggle with eating disorder.   Nutrition Education (Application):              a)  Discussed current eating pattern and ways to modify.                b)  Patient verbalizes understanding of diet by stating will set timer.    Expected patient engagement: good   Other: Patient is not currently working with therapist but has appointment scheduled for evaluation.       GOALS  Practice grounding prior to eating meal   Set timer for 20 minutes when eating meals      FOLLOW UP/MONITORING   Progress towards goals will be monitored and evaluated per protocol and Practice Guidelines  Patient to follow up in 3 weeks  RD name and number provided     Time Spent with Patient  30  minutes      Mike Huddleston, RD, LD  [facility] Outpatient Dietitian  [phone] (office phone)

## 2023-09-14 ENCOUNTER — OFFICE VISIT (OUTPATIENT)
Dept: OBGYN | Facility: OTHER | Age: 33
End: 2023-09-14
Payer: COMMERCIAL

## 2023-09-14 VITALS
HEIGHT: 67 IN | BODY MASS INDEX: 32.1 KG/M2 | WEIGHT: 204.5 LBS | SYSTOLIC BLOOD PRESSURE: 116 MMHG | DIASTOLIC BLOOD PRESSURE: 79 MMHG

## 2023-09-14 DIAGNOSIS — Z13.6 CARDIOVASCULAR SCREENING; LDL GOAL LESS THAN 160: ICD-10-CM

## 2023-09-14 DIAGNOSIS — Z12.4 ENCOUNTER FOR SCREENING FOR CERVICAL CANCER: ICD-10-CM

## 2023-09-14 DIAGNOSIS — L70.9 ACNE, UNSPECIFIED ACNE TYPE: ICD-10-CM

## 2023-09-14 DIAGNOSIS — Z97.5 IUD (INTRAUTERINE DEVICE) IN PLACE: ICD-10-CM

## 2023-09-14 DIAGNOSIS — Z01.419 WELL FEMALE EXAM WITH ROUTINE GYNECOLOGICAL EXAM: Primary | ICD-10-CM

## 2023-09-14 LAB
ANION GAP SERPL CALCULATED.3IONS-SCNC: 9 MMOL/L (ref 7–15)
BUN SERPL-MCNC: 12.5 MG/DL (ref 6–20)
CALCIUM SERPL-MCNC: 9.3 MG/DL (ref 8.6–10)
CHLORIDE SERPL-SCNC: 103 MMOL/L (ref 98–107)
CHOLEST SERPL-MCNC: 226 MG/DL
CREAT SERPL-MCNC: 0.87 MG/DL (ref 0.51–0.95)
DEPRECATED HCO3 PLAS-SCNC: 24 MMOL/L (ref 22–29)
EGFRCR SERPLBLD CKD-EPI 2021: 89 ML/MIN/1.73M2
GLUCOSE SERPL-MCNC: 99 MG/DL (ref 70–99)
HDLC SERPL-MCNC: 56 MG/DL
LDLC SERPL CALC-MCNC: 148 MG/DL
NONHDLC SERPL-MCNC: 170 MG/DL
POTASSIUM SERPL-SCNC: 4.2 MMOL/L (ref 3.4–5.3)
SODIUM SERPL-SCNC: 136 MMOL/L (ref 136–145)
TRIGL SERPL-MCNC: 110 MG/DL

## 2023-09-14 PROCEDURE — G0145 SCR C/V CYTO,THINLAYER,RESCR: HCPCS | Performed by: OBSTETRICS & GYNECOLOGY

## 2023-09-14 PROCEDURE — 36415 COLL VENOUS BLD VENIPUNCTURE: CPT | Performed by: OBSTETRICS & GYNECOLOGY

## 2023-09-14 PROCEDURE — 99395 PREV VISIT EST AGE 18-39: CPT | Performed by: OBSTETRICS & GYNECOLOGY

## 2023-09-14 PROCEDURE — 87624 HPV HI-RISK TYP POOLED RSLT: CPT | Performed by: OBSTETRICS & GYNECOLOGY

## 2023-09-14 PROCEDURE — 80048 BASIC METABOLIC PNL TOTAL CA: CPT | Performed by: OBSTETRICS & GYNECOLOGY

## 2023-09-14 PROCEDURE — 80061 LIPID PANEL: CPT | Performed by: OBSTETRICS & GYNECOLOGY

## 2023-09-14 RX ORDER — SPIRONOLACTONE 100 MG/1
100 TABLET, FILM COATED ORAL 2 TIMES DAILY
Qty: 180 TABLET | Refills: 3 | Status: SHIPPED | OUTPATIENT
Start: 2023-09-14 | End: 2023-11-07

## 2023-09-14 RX ORDER — TRETINOIN 0.25 MG/G
GEL TOPICAL AT BEDTIME
Qty: 45 G | Refills: 3 | Status: SHIPPED | OUTPATIENT
Start: 2023-09-14

## 2023-09-14 NOTE — PROGRESS NOTES
SUBJECTIVE:   CC: Vin is an 33 year old who presents for preventive health visit.     Healthy Habits:     Getting at least 3 servings of Calcium per day:  NO    Bi-annual eye exam:  Yes    Dental care twice a year:  Yes    Sleep apnea or symptoms of sleep apnea:  None    Diet:  Regular (no restrictions) and Gluten-free/reduced    Frequency of exercise:  6-7 days/week    Duration of exercise:  Greater than 60 minutes    Taking medications regularly:  Yes    Barriers to taking medications:  None    Medication side effects:  None    Additional concerns today:  No      - No history of GDM or GHTN.   Contraception - tubal .   History of endometriosis - managed with kyleena    Social History     Tobacco Use    Smoking status: Former     Types: Cigarettes    Smokeless tobacco: Never   Substance Use Topics    Alcohol use: Yes             11/3/2022    11:36 AM   Alcohol Use   Prescreen: >3 drinks/day or >7 drinks/week? No     BP Readings from Last 3 Encounters:   23 116/79   23 114/78   22 121/82    Wt Readings from Last 3 Encounters:   23 92.8 kg (204 lb 8 oz)   23 93 kg (205 lb)   22 88.1 kg (194 lb 4.8 oz)                  Patient Active Problem List   Diagnosis    Chronic pelvic pain in female    Ovarian cysts    Endometriosis of pelvis    Family history of familial hypercholesterolemia    IUD (intrauterine device) in place    Intractable episodic headache, unspecified headache type    Diarrhea, unspecified type    Abdominal bloating    Adverse effect of antibiotic, subsequent encounter    Binge eating disorder    VENITA (generalized anxiety disorder)     Past Surgical History:   Procedure Laterality Date    APPENDECTOMY  2010    DAVINCI ASSISTED ABLATION / EXCISION OF ENDOMETRIOSIS  2013    Dr. Tolliver of [facility] at [facility]    ESOPHAGOSCOPY, GASTROSCOPY, DUODENOSCOPY (EGD), COMBINED  2013    Procedure: COMBINED ESOPHAGOSCOPY, GASTROSCOPY,  DUODENOSCOPY (EGD);  EGD  Nausea   pkt given in clinic  [facility];  Surgeon: Oliver Del Cid MD;  Location: [facility] OR    GYN SURGERY  2011, 2012    cystectomy    TUBAL LIGATION  05/2021       Social History     Tobacco Use    Smoking status: Former     Types: Cigarettes    Smokeless tobacco: Never   Substance Use Topics    Alcohol use: Yes     Family History   Problem Relation Age of Onset    Hyperlipidemia Mother     Endometriosis Mother     Endometriosis Maternal Grandmother     Hyperlipidemia Maternal Grandfather     Diabetes Type 2  Paternal Grandmother     Cancer Paternal Grandmother     Prostate Cancer Paternal Grandfather          Current Outpatient Medications   Medication Sig Dispense Refill    ibuprofen (ADVIL,MOTRIN) 600 MG tablet Take 1 tablet by mouth every 6 hours as needed for pain. 90 tablet 3    levonorgestrel (KYLEENA) 19.5 MG IUD 1 each by Intrauterine route once      spironolactone (ALDACTONE) 100 MG tablet Take 1 tablet (100 mg) by mouth 2 times daily 180 tablet 3    tretinoin (RETIN-A) 0.01 % external gel Apply topically At Bedtime 45 g 2     No Known Allergies  Recent Labs   Lab Test 11/03/22  1209 11/11/21  0937 01/28/21  0855 08/29/19  0738 07/11/18  0803   LDL  --  122* 121* 166* 153*   HDL  --  63 66 66 74   TRIG  --  116 106 52 84   CR 0.74 0.78 0.80  --  0.84   GFRESTIMATED >90 >90 >90  --  80   GFRESTBLACK  --   --  >90  --  >90   POTASSIUM 4.0 3.4 4.0  --  4.0   TSH 0.90  --   --   --   --         Breast Cancer Screening:    Patient under 40 years of age: Routine Mammogram Screening not recommended.        History of abnormal Pap smear: NO - age 30-65 PAP every 5 years with negative HPV co-testing recommended      Latest Ref Rng & Units 8/9/2018     9:32 AM 8/9/2018     9:30 AM 4/15/2013    12:00 AM   PAP / HPV   PAP (Historical)  NIL   NIL    HPV 16 DNA NEG^Negative  Negative     HPV 18 DNA NEG^Negative  Negative     Other HR HPV NEG^Negative  Negative          Review of  "Systems  CONSTITUTIONAL: NEGATIVE for fever, chills, change in weight  INTEGUMENTARU/SKIN: NEGATIVE for worrisome rashes, moles or lesions  EYES: NEGATIVE for vision changes or irritation  ENT: NEGATIVE for ear, mouth and throat problems  RESP: NEGATIVE for significant cough or SOB  BREAST: NEGATIVE for masses, tenderness or discharge  CV: NEGATIVE for chest pain, palpitations or peripheral edema  GI: NEGATIVE for nausea, abdominal pain, heartburn, or change in bowel habits  : NEGATIVE for unusual urinary or vaginal symptoms. Periods are regular.  MUSCULOSKELETAL: NEGATIVE for significant arthralgias or myalgia  NEURO: NEGATIVE for weakness, dizziness or paresthesias  PSYCHIATRIC: NEGATIVE for changes in mood or affect     OBJECTIVE:   /79   Ht 1.702 m (5' 7\")   Wt 92.8 kg (204 lb 8 oz)   BMI 32.03 kg/m    Physical Exam  Gen: Alert and oriented times 3, no acute distress.  Well developed, well nourished, pleasant.    Neck: Supple, no masses.  No thyromegaly.  Breast: Symmetrical without lesions.  No dimpling, nipple discharge, or discrete masses.  No lymphadenopathy.  Chest:  Non labored.  Clear to auscultation bilaterally.    Heart: Regular, normal S1, S2.  No murmurs.   Abdomen: Soft, nontender, nondistended.  No hepatosplenomegaly.    :  Normal female external genitalia.  No lesions.  Urethral meatus normal.  Speculum exam reveals a normal vaginal vault, normal cervix with normal IUD strings.  No abnormal discharge.  Bimanual exam reveals a normal, mobile, nontender uterus.  No cervical motion tenderness.  Adnexa nontender with no palpable masses.    Extremities:  Nontender, no edema.    Pap obtained:  Yes     ASSESSMENT/PLAN:       ICD-10-CM    1. Well female exam with routine gynecological exam  Z01.419       2. IUD (intrauterine device) in place  Z97.5       3. Encounter for screening for cervical cancer  Z12.4 Pap screen with HPV - recommended age 30 - 65 years      4. Acne, unspecified acne type "  L70.9 spironolactone (ALDACTONE) 100 MG tablet     tretinoin (RETIN-A) 0.025 % external gel            COUNSELING:  Reviewed preventive health counseling, as reflected in patient instructions        She reports that she has quit smoking. Her smoking use included cigarettes. She has never used smokeless tobacco.          Sariah Lazo MD  [facility]

## 2023-09-14 NOTE — PATIENT INSTRUCTIONS
If you have labs or imaging done, the results will automatically release in [employer] without an interpretation.  Your health care professional will review those results and send an interpretation with recommendations as soon as possible, but this may be 1-3 business days.    If you have any questions regarding your visit, please contact your care team.     [employer] [facility]: [phone]  [facility] CLINIC HOURS TELEPHONE NUMBER       MD Cathy Bourne - Certified Medical Assistant     Thuy- LEAD RN  Sofía-CRAIG Haskins-CRAIG Robert-  Vanita-     Monday- [place]  8:00 a.m - 5:00 p.m    Tuesday- Surgery        Thursday- [place]  8:00 a.m - 5:00 p.m.    Friday- [place]  7:30 a.m - 4:00 p.m. [facility]  [address]  [place] [address]  [phone] Fax  Imaging Scheduling [phone]    [facility]  [address] [place] [address] Dr.  [place], [place] [address] [phone]    [facility]  [address], [place] [address] [phone] Fax  Imaging Scheduling [phone]     Urgent Care locations:  [facility] Monday-Friday  10 am - 8 pm  Saturday and Sunday   9 am - 5 pm  Monday-Friday   10 am - 8 pm  Saturday and Sunday   9 am - 5 pm   [phone]     **Surgeries** Our Surgery Schedulers will contact you to schedule. If you do not receive a call within 3 business days, please call [phone].    If you need a medication refill, please contact your pharmacy. Please allow 3 business days for your refill to be completed.    As always, thank you for trusting us with your healthcare needs!

## 2023-09-18 LAB
BKR LAB AP GYN ADEQUACY: NORMAL
BKR LAB AP GYN INTERPRETATION: NORMAL
BKR LAB AP HPV REFLEX: NORMAL
BKR LAB AP PREVIOUS ABNORMAL: NORMAL
PATH REPORT.COMMENTS IMP SPEC: NORMAL
PATH REPORT.COMMENTS IMP SPEC: NORMAL
PATH REPORT.RELEVANT HX SPEC: NORMAL

## 2023-09-20 LAB
HUMAN PAPILLOMA VIRUS 16 DNA: NEGATIVE
HUMAN PAPILLOMA VIRUS 18 DNA: NEGATIVE
HUMAN PAPILLOMA VIRUS FINAL DIAGNOSIS: NORMAL
HUMAN PAPILLOMA VIRUS OTHER HR: NEGATIVE

## 2023-10-13 ENCOUNTER — VIRTUAL VISIT (OUTPATIENT)
Dept: PSYCHOLOGY | Facility: CLINIC | Age: 33
End: 2023-10-13

## 2023-10-13 DIAGNOSIS — F41.9 ANXIETY DISORDER, UNSPECIFIED TYPE: Primary | ICD-10-CM

## 2023-10-13 PROCEDURE — 90834 PSYTX W PT 45 MINUTES: CPT | Mod: VID | Performed by: PSYCHOLOGIST
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
## 2023-10-23 ENCOUNTER — HOSPITAL ENCOUNTER (OUTPATIENT)
Dept: NUTRITION | Facility: CLINIC | Age: 33
Discharge: HOME | End: 2023-10-23
Attending: DIETITIAN, REGISTERED | Admitting: DIETITIAN, REGISTERED
Payer: COMMERCIAL

## 2023-10-23 PROCEDURE — 97803 MED NUTRITION INDIV SUBSEQ: CPT | Mod: GT,95 | Performed by: DIETITIAN, REGISTERED

## 2023-10-23 NOTE — PROGRESS NOTES
Virtual Visit Details    Type of service:  Video Visit     Originating Location (pt. Location): Home    Distant Location (provider location):  On-site  Platform used for Video Visit: BindHQ     OUTPATIENT NUTRITION ASSESSMENT   REASON FOR ASSESSMENT  Vin Unger referred by Juan Clark PA-C for MNT related to   Binge eating disorder [F50.81]  - Primary      VENITA (generalized anxiety disorder) [F41.1]      .    Patient accompanied by self       ASSESSMENT   Nutrition History:  - Information obtained from patient.  - Patient is on a regular diet at home.  Patient has struggled with food since childhood.  Patient lives with partner and 14 year old son.  Patient's struggle with food has increased in the past 2 years.       Compensatory behaviors -exercise and restriction   Explored options of eating 2 years ago when was in CBT and DBT but felt like too much at that time     10 years old -mom with yoyo dieting   15-16 years old cookout -was told by friend's mother -you need to slow down as would eat (2nd, 3rd and 4th and 5th portions).  Told parent would then restrict.  Friend's parent said patient likely has ED.       Health and weight impacted decision to address binge eating. Patient liked to go to buffets so could binge but Covid shutdowns no longer allowed binge eating so it became more expensive to binge      Last binge 2 days ago. Patient states over cooks so can over eat.  Patient eats until uncomfortably full.      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)     Sleeps 6 AM-12-1 PM during the summer   School sleeps 7:45-1:50 as drops son off at school      70-75% of day thinking about food   Thinks about body once eats a lot  Does not weigh   No purging, no laxatives   Diet pills -hydroxy cut -years ago   Stimulants pre-workout for the past 2 years thermogenics -150 mg caffeine      Sitting at table with family to eat first meal  Sitting at work desk for lunch   Eats rapidly 15 minutes   Wasn't allowed snack foods as  kid   Only ate at designated eating times -didn't get to choose foods and sat at table until completed meal   Eats until 80% full or full with first meal   Stuffed at 12 PM meal      Protein bar-the sweet      Fasting every other day -walking dog 1-2 miles (every other day) family get together will each past 2 years      9/11/2023 Patient with increased stress with trying to reduce exercise.  Patient reduced 2 hours of intense exercise by switching to walking her puppy.  Patient has not been negotiating calories.  Patient binges every other day.  Patient states binge can happen without intent.  Patient usually binges on meat.  Last binge with making lasagna.  Patient limits chips and cookies in her home.  Patient's biggest concern is whether her insurance will cover appointments.  Patient will be meeting with therapist at the end of the month.       10/23/2023 Patient frustrated that she does not have a path moving forward with her binge eating.  Patient had first appointment with her therapist.  Patient continues to struggle with binge eating.  Patient binge eats daily.  Patient tried setting timer for binge but found that just raced to have binge.  Patient feels that exercise has been less helpful for weight management so switched to eating less calorie dense foods.  During the summer patient was eating watermelon.  Patient now eating 1 head lettuce, 2 tomatoes, 1 cucumber and avocado for binge during her lunch at work.  Patient eats evening meal with her son and will be planning what she will binge on during work.  Patient with strong eating disorder thoughts. Patient frustrated that could have just taken Wegovy to reduce appetite and lose weight.        Diet Recall:  Breakfast: none    Lunch: 12:00-12:30 anything in the fridge -burrito (rice, black beans, shredded chicken) wrapped in burrito tortilla with guacamole; states size of loaf bread    Dinner: pasta-karlie and lasgana; rice and chicken; salad (spinach,  "green pepper, tomato, boiled eggs) 1 head iceberg + olives; eggs favorite (5:00-6:00 PM)   Snack: popcorn; pear or hard boiled egg  Beverages: coffee 6-8 cups per day (just likes does not use for energy)   Dining out: varies           Exercise: Every other day-exercises 2 times per day; while working will do lunges and push ups   2-3 hours in AM or 90 minutes in the afternoon + adding movement in tasks (patient has been using exercise as way to burn calories for the past 2 years)      NUTRITION FOCUSED PHYSICAL ASSESSMENT (NFPA) FOR DIAGNOSING MALNUTRITION  Yes         Observed:   No nutrition-related physical findings observed     Obtained from Chart/Interdisciplinary Team:  None noted      LABS  Labs reviewed     MEDICATIONS  Medications reviewed     ANTHROPOMETRICS   Height: 5'7\"  Weight: 205 lbs   BMI (kg/m2): 32.1 kg/m2   Weight Status:  Obesity Grade I BMI 30-34.9  %IBW: 151%  Weight History:   Wt Readings from Last 10 Encounters:   09/14/23 92.8 kg (204 lb 8 oz)   06/29/23 93 kg (205 lb)   11/03/22 88.1 kg (194 lb 4.8 oz)   11/11/21 78.7 kg (173 lb 8 oz)   02/24/21 76.2 kg (168 lb)   01/27/21 76.2 kg (168 lb)   12/09/20 76.3 kg (168 lb 4 oz)   08/26/19 71.9 kg (158 lb 8 oz)   09/10/18 71.7 kg (158 lb)   08/14/18 71.7 kg (158 lb)       ASSESSED NUTRITION NEEDS  Estimated Energy Needs: 2863-2790 kcals/day (14-17 Kcal/Kg)  Justification:  (obese)  Estimated Protein Needs: 61-73 grams protein/day (1-1.2 g pro/Kg)  Justification:  (preservation of lean body mass)  Estimated Fluid Needs: 0812-8897 mL/day (30-35 mL/kg)     ASSESSED MALNUTRITION STATUS  % Weight Loss:  None noted  % Intake:  No decreased intake noted  Subcutaneous Fat Loss:  None observed  Loss of Muscle Mass:  None observed  Fluid Retention:  None noted     Malnutrition Diagnosis:  Patient does not meet two of the above criteria necessary for diagnosing malnutrition     EVALUATION/PROGRESS TOWARDS GOALS  Previous nutrition goals:  Practice grounding " prior to eating meal -not met   Set timer for 20 minutes when eating meals -met     Previous nutrition diagnosis: Disordered eating pattern related to preoccupation with food as evidenced by current binge behaviors, restriction and excessive exercise -no change      Current nutrition diagnosis: Disordered eating pattern related to preoccupation with food as evidenced by current binge behaviors, restriction and excessive exercise        INTERVENTIONS   Nutrition Prescription   Recommend normalized eating pattern        IMPLEMENTATION   Assessed learning needs and learning preference  Teaching Method(s) used: Explanation  Vitamin and Mineral Supplements: recommend complete multivitamin and mineral   Diet Education:  Provided education on normalized eating pattern   Nutrition Education (Content):              a)  Discussed progress towards goals.  Reviewed normalized eating pattern.  Discussed options of ways to reduce binges.  Discussed current compensatory behaviors and determined strategies for behavior change.  Discussed potential binge eating medication to help with urge to binge.  Patient will be meeting with provider to discuss medication options.  Also suggest patient consider higher level of care due to strong drive for binge eating.  Suggest gradual changes for long term healthy relationship with food.  Supported patient with her struggle with eating disorder.   Nutrition Education (Application):              a)  Discussed current eating pattern and ways to modify.                b)  Patient verbalizes understanding of diet by stating will discuss treatment options with provider.    Expected patient engagement: good   Other: Patient is not currently working with therapist but has appointment scheduled for evaluation.       GOALS  Reduce volume of food prepared for binge   Reduce frequency for going to the grocery store-aim for 2 per week      FOLLOW UP/MONITORING   Progress towards goals will be monitored and  evaluated per protocol and Practice Guidelines  Patient to follow up after MD appointment  RD name and number provided     Time Spent with Patient  30  minutes      Mike Huddleston, RD, LD  [facility] Outpatient Dietitian  [phone] (office phone)

## 2023-10-31 NOTE — COMMUNITY RESOURCES LIST (ENGLISH)
10/31/2023   [facility]  N/A  For questions about this resource list or additional care needs, please contact your primary care clinic or care manager.  Phone: [phone]   Email: N/A   Address: [address] [place] [id number]   Hours: N/A        Housing       Coordinated Entry access point  1  [facility]  Office - [facility] Distance: 18.39 miles      Phone/Virtual   [address] [facility] [address] [place] [address]  Language: English  Hours: Mon - Fri 8:30 AM - 12:00 PM , Mon - Fri 1:00 PM - 4:00 PM  Fees: Free   Phone: (320) 966-7576 Ext.2 Email: [email or web address] Website: [email or web address]://[email or web address]/[email or web address]/     Drop-in center or day shelter  2  [facility] Distance: 24.24 miles      In-Person   [address] [place] [address]  Language: English, Hmong, Vietnamese, Occitan  Hours: Mon - Thu 8:30 AM - 4:30 PM , Sat - Sun 9:00 AM - 12:00 PM  Fees: Free   Phone: (717) 239-8367 Email: info@[employer].org Website: https://[email or web address]/     Housing search assistance  3  [facility] -  & Public Health Distance: 15.71 miles      Phone/Virtual   [address] [place] [address]  Language: English  Hours: Mon - Fri 8:00 AM - 4:30 PM  Fees: Free   Phone: (899) 412-1850 Email: hsfsprograms@[email or web address]. Website: [email or web address]/Health-[email or web address]-Services     4  [facility] [employer] Distance: 18.88 miles      Phone/Virtual   [address] [place] [address]  Language: English, Occitan  Hours: Mon - Fri 9:00 AM - 5:00 PM  Fees: Free   Phone: (325) 911-6796 Email: services@[employer] Website: https://www.[employer]aca.com     Shelter for individuals  5  [facility] Distance: 24.61 miles      In-Person   [address] [place] [address]  Language: English  Hours: Mon - Fri  4:00 PM - 9:00 AM  Fees: Free   Phone: (522) 640-2195 Email: [email or web address] Website: [email or web address]/northern/[facility]/          Important Numbers & Websites       Emergency Services   911  [facility] Services   311  Poison Control   (223) 995-6455  Suicide Prevention Lifeline   (419) 278-7621 (TALK)  Child Abuse Hotline   (116) 662-1817 (4-A-Child)  Sexual Assault Hotline   (952) 258-3897 (HOPE)  National Runaway Safeline   (117) 644-2194 (RUNAWAY)  All-Options Talkline   (790) 850-7464  Substance Abuse Referral   (791) 856-4708 (HELP)

## 2023-11-02 ENCOUNTER — VIRTUAL VISIT (OUTPATIENT)
Dept: PSYCHOLOGY | Facility: CLINIC | Age: 33
End: 2023-11-02
Payer: COMMERCIAL

## 2023-11-02 DIAGNOSIS — F41.9 ANXIETY DISORDER, UNSPECIFIED TYPE: Primary | ICD-10-CM

## 2023-11-02 DIAGNOSIS — F50.819 BINGE EATING DISORDER: ICD-10-CM

## 2023-11-02 PROCEDURE — 90791 PSYCH DIAGNOSTIC EVALUATION: CPT | Mod: 95 | Performed by: PSYCHOLOGIST
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
## 2023-11-04 ASSESSMENT — ANXIETY QUESTIONNAIRES
2. NOT BEING ABLE TO STOP OR CONTROL WORRYING: NOT AT ALL
5. BEING SO RESTLESS THAT IT IS HARD TO SIT STILL: NEARLY EVERY DAY
4. TROUBLE RELAXING: NEARLY EVERY DAY
GAD7 TOTAL SCORE: 11
GAD7 TOTAL SCORE: 11
7. FEELING AFRAID AS IF SOMETHING AWFUL MIGHT HAPPEN: NOT AT ALL
1. FEELING NERVOUS, ANXIOUS, OR ON EDGE: MORE THAN HALF THE DAYS
3. WORRYING TOO MUCH ABOUT DIFFERENT THINGS: SEVERAL DAYS
IF YOU CHECKED OFF ANY PROBLEMS ON THIS QUESTIONNAIRE, HOW DIFFICULT HAVE THESE PROBLEMS MADE IT FOR YOU TO DO YOUR WORK, TAKE CARE OF THINGS AT HOME, OR GET ALONG WITH OTHER PEOPLE: NOT DIFFICULT AT ALL
6. BECOMING EASILY ANNOYED OR IRRITABLE: MORE THAN HALF THE DAYS

## 2023-11-04 ASSESSMENT — PATIENT HEALTH QUESTIONNAIRE - PHQ9
SUM OF ALL RESPONSES TO PHQ QUESTIONS 1-9: 6
10. IF YOU CHECKED OFF ANY PROBLEMS, HOW DIFFICULT HAVE THESE PROBLEMS MADE IT FOR YOU TO DO YOUR WORK, TAKE CARE OF THINGS AT HOME, OR GET ALONG WITH OTHER PEOPLE: NOT DIFFICULT AT ALL
SUM OF ALL RESPONSES TO PHQ QUESTIONS 1-9: 6

## 2023-11-04 NOTE — COMMUNITY RESOURCES LIST (ENGLISH)
11/04/2023   [facility]  N/A  For questions about this resource list or additional care needs, please contact your primary care clinic or care manager.  Phone: [phone]   Email: N/A   Address: [address] [place] [id number]   Hours: N/A        Housing       Coordinated Entry access point  1  [facility]  Office - [facility] Distance: 18.39 miles      Phone/Virtual   [address] [facility] [address] [place] [address]  Language: English  Hours: Mon - Fri 8:30 AM - 12:00 PM , Mon - Fri 1:00 PM - 4:00 PM  Fees: Free   Phone: (446) 241-6917 Ext.2 Email: bobby@[facility]."[employer].org Website: https://www.[facility].org/usn/     Drop-in center or day shelter  2  [facility] Distance: 24.24 miles      In-Person   [address] [place] [address]  Language: English, Hmong, Bhutanese, Kinyarwanda  Hours: Mon - Thu 8:30 AM - 4:30 PM , Sat - Sun 9:00 AM - 12:00 PM  Fees: Free   Phone: (119) 595-3329 Email: info@[employer].org Website: https://[employer].org/     Housing search assistance  3  [facility] -  & Public Health Distance: 15.71 miles      Phone/Virtual   [address] [place] [address]  Language: English  Hours: Mon - Fri 8:00 AM - 4:30 PM  Fees: Free   Phone: (814) 654-5984 Email: hsfsprograms@[facility]. Website: http://www.[facility]./217/Health-Human-Services     4  Neighborhood Assistance [employer] ([employer]) Distance: 18.88 miles      Phone/Virtual   [address] [place] [address]  Language: English, Kinyarwanda  Hours: Mon - Fri 9:00 AM - 5:00 PM  Fees: Free   Phone: (671) 874-1334 Email: services@[employer] Website: https://www.[place]aca.com     Shelter for individuals  5  [facility] Distance: 24.61 miles      In-Person   [address] [place] [address]  Language: English  Hours: Mon - Fri  4:00 PM - 9:00 AM  Fees: Free   Phone: (837) 218-7829 Email: jacques@[facility].[employer].org Website: https://[facility].[place].org/northern/[facility]/          Important Numbers & Websites       Emergency Services   911  [facility]   311  Poison Control   (841) 736-6887  Suicide Prevention Lifeline   (252) 956-4628 (TALK)  Child Abuse Hotline   (479) 547-6800 (4-A-Child)  Sexual Assault Hotline   (965) 330-2093 (HOPE)  National Runaway Safeline   (763) 203-8155 (RUNAWAY)  All-Options Talkline   (240) 218-6181  Substance Abuse Referral   (592) 356-4757 (HELP)

## 2023-11-07 ENCOUNTER — OFFICE VISIT (OUTPATIENT)
Dept: FAMILY MEDICINE | Facility: OTHER | Age: 33
End: 2023-11-07
Payer: COMMERCIAL

## 2023-11-07 ENCOUNTER — PATIENT OUTREACH (OUTPATIENT)
Dept: CARE COORDINATION | Facility: CLINIC | Age: 33
End: 2023-11-07

## 2023-11-07 VITALS
DIASTOLIC BLOOD PRESSURE: 68 MMHG | BODY MASS INDEX: 33.59 KG/M2 | HEIGHT: 67 IN | WEIGHT: 214 LBS | SYSTOLIC BLOOD PRESSURE: 116 MMHG | HEART RATE: 88 BPM | OXYGEN SATURATION: 99 % | TEMPERATURE: 97.5 F | RESPIRATION RATE: 16 BRPM

## 2023-11-07 DIAGNOSIS — Z13.9 ENCOUNTER FOR SCREENING INVOLVING SOCIAL DETERMINANTS OF HEALTH (SDOH): Primary | ICD-10-CM

## 2023-11-07 DIAGNOSIS — F41.1 GAD (GENERALIZED ANXIETY DISORDER): ICD-10-CM

## 2023-11-07 DIAGNOSIS — L70.9 ACNE, UNSPECIFIED ACNE TYPE: ICD-10-CM

## 2023-11-07 DIAGNOSIS — F50.819 BINGE EATING DISORDER: ICD-10-CM

## 2023-11-07 PROCEDURE — 99214 OFFICE O/P EST MOD 30 MIN: CPT | Performed by: PHYSICIAN ASSISTANT

## 2023-11-07 RX ORDER — LISDEXAMFETAMINE DIMESYLATE 10 MG/1
10 CAPSULE ORAL DAILY
Qty: 30 CAPSULE | Refills: 0 | Status: SHIPPED | OUTPATIENT
Start: 2023-11-07 | End: 2023-12-08

## 2023-11-07 RX ORDER — SPIRONOLACTONE 100 MG/1
100 TABLET, FILM COATED ORAL 2 TIMES DAILY
Qty: 180 TABLET | Refills: 3 | Status: SHIPPED | OUTPATIENT
Start: 2023-11-07 | End: 2024-08-16

## 2023-11-07 ASSESSMENT — PAIN SCALES - GENERAL: PAINLEVEL: NO PAIN (0)

## 2023-11-07 NOTE — PROGRESS NOTES
Clinic Care Coordination - Chart Review Only    Situation: Patient chart reviewed by care coordination leader.    Background: Patient completed office visit on 11/7/23 with PCP.    Assessment: Primary Care- Care Coordination referral initially signed by provider secondary to SDOH concern, however provider cancelled moments later and referenced that referral was signed in error.    Plan/Recommendations: Primary Care - Care Coordination Compass Vivi episode closed to reflect cancelled referral above.  No further action planned at this time by Care Coordination.     Jessica Brian, ROLANDON, RN   Manager of Ambulatory Care Management  [facility]

## 2023-11-07 NOTE — PROGRESS NOTES
"  Assessment & Plan     Encounter for screening involving social determinants of health (SDoH)  Patient expresses no concerns with financing or housing at this point in time.  - Primary Care - Care Coordination Referral; Future    Binge eating disorder  Trial of medications as suggested by nutritionist.  Follow-up in 3 months is advised.  Sooner if necessary.  - lisdexamfetamine (VYVANSE) 10 MG capsule; Take 1 capsule (10 mg) by mouth daily for 30 days    VENITA (generalized anxiety disorder)  Historically noted no new concerns here.  Follow-up in 3 months.    Acne, unspecified acne type  Historically noted and working well.  Refilled medications.  Follow-up..  - spironolactone (ALDACTONE) 100 MG tablet; Take 1 tablet (100 mg) by mouth 2 times daily     Juan Riley PA-C  [facility]    Rainer Banuelos is a 33 year old, presenting for the following health issues:  binge eating disorder      11/7/2023     7:15 AM   Additional Questions   Roomed by Olamide       History of Present Illness       Reason for visit:  Binge Eating Disorder follow-up    She eats 4 or more servings of fruits and vegetables daily.She consumes 0 sweetened beverage(s) daily.She exercises with enough effort to increase her heart rate 60 or more minutes per day.  She exercises with enough effort to increase her heart rate 5 days per week. She is missing 1 dose(s) of medications per week.  She is not taking prescribed medications regularly due to remembering to take.         Follow up Binge eating disorder    Review of Systems   Constitutional, HEENT, cardiovascular, pulmonary, GI, , musculoskeletal, neuro, skin, endocrine and psych systems are negative, except as otherwise noted.      Objective    /68   Pulse 88   Temp 97.5  F (36.4  C) (Temporal)   Resp 16   Ht 1.702 m (5' 7\")   Wt 97.1 kg (214 lb)   SpO2 99%   BMI 33.52 kg/m    Body mass index is 33.52 kg/m .  Physical Exam   GENERAL: healthy, alert and " no distress  NECK: no adenopathy, no asymmetry, masses, or scars and thyroid normal to palpation  RESP: lungs clear to auscultation - no rales, rhonchi or wheezes  CV: regular rate and rhythm, normal S1 S2, no S3 or S4, no murmur, click or rub, no peripheral edema and peripheral pulses strong  ABDOMEN: soft, nontender, no hepatosplenomegaly, no masses and bowel sounds normal  MS: no gross musculoskeletal defects noted, no edema  SKIN: Very mild acne breakouts to the face is noted today.  No other true concerns.  No lesions of concern to exposed skin today.  No results found for this or any previous visit (from the past 24 hour(s)).                   No

## 2023-11-07 NOTE — COMMUNITY RESOURCES LIST (ENGLISH)
11/07/2023   [facility]  N/A  For additional resource needs, please contact your health insurance member services or your primary care team.  Phone: [phone]   Email: N/A   Address: [address] [place] [id number]   Hours: N/A        Housing       Coordinated Entry access point  1  [facility]  Office - [facility] Distance: 18.39 miles      Phone/Virtual   [address] [facility] [address] [place] [address]  Language: English  Hours: Mon - Fri 8:30 AM - 12:00 PM , Mon - Fri 1:00 PM - 4:00 PM  Fees: Free   Phone: (917) 816-3654 Ext.2 Email: bobby@[email or web address] [employer].org Website: [email or web address]://[email or web address].org/usn/     Drop-in center or day shelter  2  [facility] Distance: 24.24 miles      In-Person   [address] [place] [address]  Language: English, Hmong, Gibraltarian, Slovak  Hours: Mon - Thu 8:30 AM - 4:30 PM , Sat - Sun 9:00 AM - 12:00 PM  Fees: Free   Phone: (705) 319-7594 Email: info@[employer].org Website: https://[employer].org/     Housing search assistance  3  [employer] - Online housing search assistance Distance: 24.27 miles      Phone/Virtual   [address] [facility] [address] [place] [address]  Language: English, Hmong, Gibraltarian, Slovak  Hours: Mon - Sun Open 24 Hours   Phone: (354) 320-9437 Email: info@housinglink.org Website: http://www.housinglink.org/     4  [facility]  [facility] Distance: 15.71 miles      Phone/Virtual   [address] [place] [address]  Language: English  Hours: Mon - Fri 8:00 AM - 4:30 PM  Fees: Free   Phone: (270) 576-1690 Email: hsfsprogzully@[facility]. Website: http://www.[facility]./217/Health-Human-Services     Shelter for individuals  5  [facility] Distance: 24.61 miles      In-Person   [address] [place] [address]  Language: English  Hours: Mon - Fri 4:00 PM - 9:00 AM   Fees: Free   Phone: (787) 612-3109 Email: jacques@[facility].org Website: https://[facility].salvationarmy.org/[facility]/          Important Numbers & Websites       [place] [address] [address].org  Poison Control   (361) 620-6545 [place]  Suicide and Crisis Lifeline   [address] [place], [place] [address]line.org  [facility] [place] Child Abuse Hotline   721.669.5296 Childhelphotline.org  National Sexual Assault Hotline   (493) 636-9732 (HOPE) [facility] Runaway Safeline   (717) 918-4425 (RUNAWAY) Formerly named [facility] Cent[facility].org  Pregnancy & Postpartum Support [place]   Call/text 874-963-4788 Ppsupport[place].org  Substance Abuse National Helpline ([facility]   535-450-HELP (8250) Findtreatment.gov  Emergency Services   912

## 2023-11-08 ASSESSMENT — COLUMBIA-SUICIDE SEVERITY RATING SCALE - C-SSRS
6. HAVE YOU EVER DONE ANYTHING, STARTED TO DO ANYTHING, OR PREPARED TO DO ANYTHING TO END YOUR LIFE?: NO
2. HAVE YOU ACTUALLY HAD ANY THOUGHTS OF KILLING YOURSELF IN THE PAST MONTH?: NO
1. IN THE PAST MONTH, HAVE YOU WISHED YOU WERE DEAD OR WISHED YOU COULD GO TO SLEEP AND NOT WAKE UP?: NO

## 2023-11-13 ENCOUNTER — VIRTUAL VISIT (OUTPATIENT)
Dept: PSYCHOLOGY | Facility: CLINIC | Age: 33
End: 2023-11-13
Payer: COMMERCIAL

## 2023-11-13 DIAGNOSIS — F41.9 ANXIETY DISORDER, UNSPECIFIED TYPE: Primary | ICD-10-CM

## 2023-11-13 PROCEDURE — 90834 PSYTX W PT 45 MINUTES: CPT | Mod: 95 | Performed by: PSYCHOLOGIST

## 2023-11-13 NOTE — PROGRESS NOTES
[facility]   Mental Health & Addiction Services     Progress Note - Initial Visit    Patient  Name:  Vin Unger Date: 11/13/23         Service Type: Individual     Visit Start Time: 11:00AM  Visit End Time: 11:45AM    Visit #: 1    Attendees: Client    Service Modality:  Video Visit:      Provider verified identity through the following two step process.  Patient provided:  Patient is known previously to provider    Telemedicine Visit: The patient's condition can be safely assessed and treated via synchronous audio and visual telemedicine encounter.      Reason for Telemedicine Visit: Services only offered telehealth    Originating Site (Patient Location): Patient's home    Distant Site (Provider Location): Provider Remote Setting- Home Office    Consent:  The patient/guardian has verbally consented to: the potential risks and benefits of telemedicine (video visit) versus in person care; bill my insurance or make self-payment for services provided; and responsibility for payment of non-covered services.     Patient would like the video invitation sent by:  My Chart    Mode of Communication:  Video Conference via Amwell    Distant Location (Provider):  Off-site    As the provider I attest to compliance with applicable laws and regulations related to telemedicine.       DATA:   Interactive Complexity: No   Crisis: No     Presenting Concerns/  Current Stressors:   Met with nutritionist who recommended Vivance. If that doesn't work suggested Outpatient. Met with PCP who rx'd Vivance 10mg. Feels a little tired about an hour after after she takes it after she wakes up. Has also reduced caffeine intake. Referring doctor wanted her to explore ADHD with me.   Has an issue retaining info she reads. Has to read things multiple times. Was good in math. Did OK if she just had to memorize things. Was diagnosed at one point with agoraphobia. Gets overstimulated if multiple conversations are going on.  Hard to focus in coffee shops. If in more intense social situations, palp and SOB. Took ativan at one point while she was Doing DBT. Looses track very easily, procrastination.  Tries to do a lot of things at same time and doesn't finish stuff.  Doesn't watch TV or movies much as she has so much going on. Notes fidgeting a lot clicking pen tossing pen back and forth. Folding and unfolding post-its. Can't type and engage in conversation at same time.         ASSESSMENT:  Mental Status Assessment:  Appearance:   Appropriate   Eye Contact:   Good   Psychomotor Behavior: Normal   Attitude:   Cooperative  Interested  Orientation:   All  Speech   Rate / Production: Normal/ Responsive   Volume:  Normal   Mood:    Normal  Affect:    Appropriate   Thought Content:  Clear   Thought Form:  Coherent   Insight:    Good       Safety Issues and Plan for Safety and Risk Management:   [place] Suicide Severity Rating Scale (Short Version)      11/8/2023     5:00 PM   [place] Suicide Severity Rating (Short Version)   Q1 Wished to be Dead (Past Month) no   Q2 Suicidal Thoughts (Past Month) no   Q6 Suicide Behavior (Lifetime) no     Patient denies current fears or concerns for personal safety.  Patient denies current or recent suicidal ideation or behaviors.  Patient denies current or recent homicidal ideation or behaviors.  Patient denies current or recent self injurious behavior or ideation.  Patient denies other safety concerns.  Recommended that patient call 911 or go to the local ED should there be a change in any of these risk factors.  Patient reports there are no firearms in the house.     Diagnostic Criteria:  Unspecified Anxiety Disorder  Anxiety disorder is present, but at this time therapist is unable to determine whether it is primary.  Further assessment needed.  History of binge eating disorder      DSM5 Diagnoses: (Sustained by DSM5 Criteria Listed Above)  Diagnoses: 300.00 (F41.9) Unspecified Anxiety Disorder  307.51  (F50.8) Binge-Eating Disorder In partial remission  Severity: Moderate  Psychosocial & Contextual Factors: Has requested ADHD Assessment  WHODAS 2.0 (12 item):        No data to display              Intervention:   CBT- Patient was educated on the CBT model and asked to bring in examples at next session  Collateral Reports Completed:  Not Applicable      PLAN: (Homework, other):  1. Provider will continue Diagnostic Assessment.      2. Provider recommended the following referrals: N/A.      3.  Suicide Risk and Safety Concerns were assessed for Vin Unger.    Patient meets the following risk assessment and triage: Patient denied any current/recent/lifetime history of suicidal ideation and/or behaviors.  No safety plan indicated at this time.       Jesús Gonzales, PhD  November 13, 2023

## 2023-12-05 ENCOUNTER — VIRTUAL VISIT (OUTPATIENT)
Dept: PSYCHOLOGY | Facility: CLINIC | Age: 33
End: 2023-12-05
Payer: COMMERCIAL

## 2023-12-05 DIAGNOSIS — F41.9 ANXIETY DISORDER, UNSPECIFIED TYPE: Primary | ICD-10-CM

## 2023-12-05 DIAGNOSIS — F50.819 BINGE EATING DISORDER: ICD-10-CM

## 2023-12-05 PROCEDURE — 90834 PSYTX W PT 45 MINUTES: CPT | Mod: 95 | Performed by: PSYCHOLOGIST

## 2023-12-05 NOTE — PROGRESS NOTES
[facility]   Mental Health & Addiction Services     Progress Note - Initial Visit    Patient  Name:  Vin Unger Date: 12/6/23         Service Type: Individual     Visit Start Time: 11:00AM  Visit End Time: 11:45AM    Visit #: 4    Attendees: Client    Service Modality:  Video Visit:      Provider verified identity through the following two step process.  Patient provided:  Patient is known previously to provider    Telemedicine Visit: The patient's condition can be safely assessed and treated via synchronous audio and visual telemedicine encounter.      Reason for Telemedicine Visit: Services only offered telehealth    Originating Site (Patient Location): Patient's home    Distant Site (Provider Location): Provider Remote Setting- Home Office    Consent:  The patient/guardian has verbally consented to: the potential risks and benefits of telemedicine (video visit) versus in person care; bill my insurance or make self-payment for services provided; and responsibility for payment of non-covered services.     Patient would like the video invitation sent by:  My Chart    Mode of Communication:  Video Conference via Amwell    Distant Location (Provider):  Off-site    As the provider I attest to compliance with applicable laws and regulations related to telemedicine.       DATA:   Interactive Complexity: No   Crisis: No     Presenting Concerns/  Current Stressors:   Hasn't noticed any change with Vivance, makes her tired. Vivance was prescription for binge eating. Fidgeting more or less the same. Anxiety the same. DBT Helps her manage it. Eating also the same. Binging about every other day.      Holiday season always stressful. Handling it about the same.     Sleep is OK for working third shift.     Not much on self-care. Usually results in additional purchases.   Takes dogs out, has fields nearby for them to run in. Has game night at home. Visit relatives once or twice a month.      ASSESSMENT:  Mental Status Assessment:  Appearance:   Appropriate   Eye Contact:   Good   Psychomotor Behavior: Normal   Attitude:   Cooperative  Interested  Orientation:   All  Speech   Rate / Production: Normal/ Responsive   Volume:  Normal   Mood:    Normal  Affect:    Appropriate   Thought Content:  Clear   Thought Form:  Coherent   Insight:    Good       Safety Issues and Plan for Safety and Risk Management:   [place] Suicide Severity Rating Scale (Short Version)      11/8/2023     5:00 PM   [place] Suicide Severity Rating (Short Version)   Q1 Wished to be Dead (Past Month) no   Q2 Suicidal Thoughts (Past Month) no   Q6 Suicide Behavior (Lifetime) no     Patient denies current fears or concerns for personal safety.  Patient denies current or recent suicidal ideation or behaviors.  Patient denies current or recent homicidal ideation or behaviors.  Patient denies current or recent self injurious behavior or ideation.  Patient denies other safety concerns.  Recommended that patient call 911 or go to the local ED should there be a change in any of these risk factors.  Patient reports there are no firearms in the house.     Diagnostic Criteria:  Unspecified Anxiety Disorder  Anxiety disorder is present, but at this time therapist is unable to determine whether it is primary.  Further assessment needed.  History of binge eating disorder      DSM5 Diagnoses: (Sustained by DSM5 Criteria Listed Above)  Diagnoses: 300.00 (F41.9) Unspecified Anxiety Disorder  307.51 (F50.8) Binge-Eating Disorder In partial remission  Severity: Moderate  Psychosocial & Contextual Factors: Has requested ADHD Assessment  WHODAS 2.0 (12 item):        No data to display              Intervention:   CBT- Patient was educated on the CBT model and asked to bring in examples at next session  Collateral Reports Completed:  Not Applicable      PLAN: (Homework, other):  1. Provider will continue Diagnostic Assessment.      2. Provider  recommended the following referrals: N/A.      3.  Suicide Risk and Safety Concerns were assessed for Vin Unger.    Patient meets the following risk assessment and triage: Patient denied any current/recent/lifetime history of suicidal ideation and/or behaviors.  No safety plan indicated at this time.       Jesús Gonzales, PhD  November 13, 2023

## 2023-12-08 ENCOUNTER — MYC REFILL (OUTPATIENT)
Dept: FAMILY MEDICINE | Facility: OTHER | Age: 33
End: 2023-12-08
Payer: COMMERCIAL

## 2023-12-08 DIAGNOSIS — F50.819 BINGE EATING DISORDER: ICD-10-CM

## 2023-12-08 RX ORDER — LISDEXAMFETAMINE DIMESYLATE 10 MG/1
10 CAPSULE ORAL DAILY
Qty: 30 CAPSULE | Refills: 0 | Status: SHIPPED | OUTPATIENT
Start: 2023-12-08 | End: 2024-02-08

## 2023-12-13 ENCOUNTER — HOSPITAL ENCOUNTER (OUTPATIENT)
Dept: NUTRITION | Facility: CLINIC | Age: 33
Discharge: HOME | End: 2023-12-13
Attending: DIETITIAN, REGISTERED | Admitting: DIETITIAN, REGISTERED
Payer: COMMERCIAL

## 2023-12-13 PROCEDURE — 97803 MED NUTRITION INDIV SUBSEQ: CPT | Mod: GT,95 | Performed by: DIETITIAN, REGISTERED

## 2023-12-13 NOTE — PROGRESS NOTES
Virtual Visit Details    Type of service:  Video Visit     Originating Location (pt. Location): Home    Distant Location (provider location):  On-site  Platform used for Video Visit: [facility]     OUTPATIENT NUTRITION REASSESSMENT   REASON FOR ASSESSMENT  Vin Unger referred by Juan Clark PA-C for MNT related to   Binge eating disorder [F50.81]  - Primary      VENITA (generalized anxiety disorder) [F41.1]      .    Patient accompanied by self       ASSESSMENT   Nutrition History:  - Information obtained from patient.  - Patient is on a regular diet at home.  Patient has struggled with food since childhood.  Patient lives with partner and 14 year old son.  Patient's struggle with food has increased in the past 2 years.       Compensatory behaviors -exercise and restriction   Explored options of eating 2 years ago when was in CBT and DBT but felt like too much at that time     10 years old -mom with yoyo dieting   15-16 years old cookout -was told by friend's mother -you need to slow down as would eat (2nd, 3rd and 4th and 5th portions).  Told parent would then restrict.  Friend's parent said patient likely has ED.       Health and weight impacted decision to address binge eating. Patient liked to go to buffets so could binge but Covid shutdowns no longer allowed binge eating so it became more expensive to binge      Last binge 2 days ago. Patient states over cooks so can over eat.  Patient eats until uncomfortably full.      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)     Sleeps 6 AM-12-1 PM during the summer   School sleeps 7:45-1:50 as drops son off at school      70-75% of day thinking about food   Thinks about body once eats a lot  Does not weigh   No purging, no laxatives   Diet pills -hydroxy cut -years ago   Stimulants pre-workout for the past 2 years thermogenics -150 mg caffeine      Sitting at table with family to eat first meal  Sitting at work desk for lunch   Eats rapidly 15 minutes   Wasn't allowed snack foods as  kid   Only ate at designated eating times -didn't get to choose foods and sat at table until completed meal   Eats until 80% full or full with first meal   Stuffed at 12 PM meal      Protein bar-the sweet      Fasting every other day -walking dog 1-2 miles (every other day) family get together will each past 2 years      9/11/2023 Patient with increased stress with trying to reduce exercise.  Patient reduced 2 hours of intense exercise by switching to walking her puppy.  Patient has not been negotiating calories.  Patient binges every other day.  Patient states binge can happen without intent.  Patient usually binges on meat.  Last binge with making lasagna.  Patient limits chips and cookies in her home.  Patient's biggest concern is whether her insurance will cover appointments.  Patient will be meeting with therapist at the end of the month.        10/23/2023 Patient frustrated that she does not have a path moving forward with her binge eating.  Patient had first appointment with her therapist.  Patient continues to struggle with binge eating.  Patient binge eats daily.  Patient tried setting timer for binge but found that just raced to have binge.  Patient feels that exercise has been less helpful for weight management so switched to eating less calorie dense foods.  During the summer patient was eating watermelon.  Patient now eating 1 head lettuce, 2 tomatoes, 1 cucumber and avocado for binge during her lunch at work.  Patient eats evening meal with her son and will be planning what she will binge on during work.  Patient with strong eating disorder thoughts. Patient frustrated that could have just taken Wegovy to reduce appetite and lose weight.       12/13/2023 Feeling more tired with Vyvanse; every other day binge eating -yesterday-cowboy cavier (2-3 cans beans, 4 tomatoes, 3-4 avocado, 2 bags frozen corn, 1 bag frozen peas)/vegetable salsa + corn tortilla; pizza + 4 orange; more consistent timing with  "eating lunchtime; wakes up 1:50 PM coffee. Patient would like to change relationship with exercise to focus on strength, etc vs exercise that will burn the most calories.   4:00-5:00 PM breakfast   12:30 AM lunch   5:00-6:00 AM 5-6 Eggs -green bell pepper     2-3 hours exercise   1.5 hours exercise   Walked dog after brought (1 hour)      Diet Recall:  Breakfast: none    Lunch: 12:00-12:30 anything in the fridge -burrito (rice, black beans, shredded chicken) wrapped in burrito tortilla with guacamole; states size of loaf bread    Dinner: pasta-karlie and lasgana; rice and chicken; salad (spinach, green pepper, tomato, boiled eggs) 1 head iceberg + olives; eggs favorite (5:00-6:00 PM)   Snack: popcorn; pear or hard boiled egg  Beverages: coffee 6-8 cups per day (just likes does not use for energy)   Dining out: varies           Exercise: Every other day-exercises 2 times per day; while working will do lunges and push ups   2-3 hours in AM or 90 minutes in the afternoon + adding movement in tasks (patient has been using exercise as way to burn calories for the past 2 years)      NUTRITION FOCUSED PHYSICAL ASSESSMENT (NFPA) FOR DIAGNOSING MALNUTRITION  Yes         Observed:   No nutrition-related physical findings observed     Obtained from Chart/Interdisciplinary Team:  None noted      LABS  Labs reviewed     MEDICATIONS  Medications reviewed     ANTHROPOMETRICS   Height: 5'7\"  Weight: 214 lbs   BMI (kg/m2): 33.5 kg/m2   Weight Status:  Obesity Grade I BMI 30-34.9  %IBW: 151%  Weight History:   Wt Readings from Last 10 Encounters:   11/07/23 97.1 kg (214 lb)   09/14/23 92.8 kg (204 lb 8 oz)   06/29/23 93 kg (205 lb)   11/03/22 88.1 kg (194 lb 4.8 oz)   11/11/21 78.7 kg (173 lb 8 oz)   02/24/21 76.2 kg (168 lb)   01/27/21 76.2 kg (168 lb)   12/09/20 76.3 kg (168 lb 4 oz)   08/26/19 71.9 kg (158 lb 8 oz)   09/10/18 71.7 kg (158 lb)        ASSESSED NUTRITION NEEDS  Estimated Energy Needs: 4543-1437 kcals/day (14-17 " Kcal/Kg)  Justification:  (obese)  Estimated Protein Needs: 61-73 grams protein/day (1-1.2 g pro/Kg)  Justification:  (preservation of lean body mass)  Estimated Fluid Needs: 1963-0190 mL/day (30-35 mL/kg)     ASSESSED MALNUTRITION STATUS  % Weight Loss:  None noted  % Intake:  No decreased intake noted  Subcutaneous Fat Loss:  None observed  Loss of Muscle Mass:  None observed  Fluid Retention:  None noted     Malnutrition Diagnosis:  Patient does not meet two of the above criteria necessary for diagnosing malnutrition     EVALUATION/PROGRESS TOWARDS GOALS  Previous nutrition goals:  Reduce volume of food prepared for binge -not met   Reduce frequency for going to the grocery store-aim for 2 per week -met     Previous nutrition diagnosis: Disordered eating pattern related to preoccupation with food as evidenced by current binge behaviors, restriction and excessive exercise -no change      Current nutrition diagnosis: Disordered eating pattern related to preoccupation with food as evidenced by current binge behaviors, restriction and excessive exercise        INTERVENTIONS   Nutrition Prescription   Recommend normalized eating pattern        IMPLEMENTATION   Assessed learning needs and learning preference  Teaching Method(s) used: Explanation  Vitamin and Mineral Supplements: recommend complete multivitamin and mineral   Diet Education:  Provided education on normalized eating pattern   Nutrition Education (Content):              a)  Discussed progress towards goals.  Reviewed normalized eating pattern.  Discussed options of ways to reduce binges.  Encouraged patient to reduce volume of food during binge.  Discussed current compensatory behaviors and determined strategies for behavior change.  Discussed alternative binge eating medication to help with urge to binge.  Patient states does not plan to continue Vyvanse as does not feel a difference when taking it.  Also suggest patient consider higher level of care due  to strong drive for binge eating.  Suggest gradual changes for long term healthy relationship with food and exercise.  Supported patient with her struggle with eating disorder.   Nutrition Education (Application):              a)  Discussed purchasing individual portions for meals to reduce volume of food.               b)  Patient verbalizes understanding of diet by stating will try cost analysis as motivation to reduce binge.    Expected patient engagement: good   Other: Patient is not currently working with therapist but has appointment scheduled for evaluation.       GOALS  Ty cost analysis to quantify binge   Purchase binge eating workbook      FOLLOW UP/MONITORING   Progress towards goals will be monitored and evaluated per protocol and Practice Guidelines  Patient to follow up after MD appointment  RD name and number provided     Time Spent with Patient  30  minutes     Mike Huddleston, BRANDYN, LD  [facility] Outpatient Dietitian  [phone] (office phone)      Mike Huddleston RD, MILLI  [facility] Outpatient Dietitian  [phone] (office phone)

## 2023-12-14 NOTE — DISCHARGE INSTRUCTIONS
Clarke Banuelos,    Here's the name of the workbook I mentioned:    The Binge Eating Prevention Workbook by Nettie (authors Sharlene Hazel and Trina Rodríguez)     Please let me know if you have any questions prior to our appointment in January.    Take care,    Mike Hitchcock, RD, LD  [facility] Outpatient Dietitian  [phone] (office phone)

## 2024-01-03 ENCOUNTER — VIRTUAL VISIT (OUTPATIENT)
Dept: PSYCHOLOGY | Facility: CLINIC | Age: 34
End: 2024-01-03
Payer: COMMERCIAL

## 2024-01-03 DIAGNOSIS — F90.2 ADHD (ATTENTION DEFICIT HYPERACTIVITY DISORDER), COMBINED TYPE: Primary | ICD-10-CM

## 2024-01-03 DIAGNOSIS — F50.819 BINGE EATING DISORDER: ICD-10-CM

## 2024-01-03 PROCEDURE — 90834 PSYTX W PT 45 MINUTES: CPT | Mod: 95 | Performed by: PSYCHOLOGIST

## 2024-01-03 NOTE — PROGRESS NOTES
[facility]                                     Progress Note    Disclaimer: Voice recognition software was used to generate this note. As a result, wrong word or 'sound-a-like' substitutions may have occurred due to the inherent limitations of voice recognition software. There may be errors in the script that have gone undetected. Please consider this when interpreting information found in this chart.     Patient Name: Vin Unger  Date: January 3, 2024         Service Type: Individual      Session Start Time: 11:02 AM  Session End Time: 11:45     Session Length: 45    Session #: 5    Attendees: Client attended alone    Service Modality:  Video Visit:      Provider verified identity through the following two step process.  Patient provided:  Patient is known previously to provider    Telemedicine Visit: The patient's condition can be safely assessed and treated via synchronous audio and visual telemedicine encounter.      Reason for Telemedicine Visit: Services only offered telehealth    Originating Site (Patient Location): Patient's home    Distant Site (Provider Location): Provider Remote Setting- Home Office    Consent:  The patient/guardian has verbally consented to: the potential risks and benefits of telemedicine (video visit) versus in person care; bill my insurance or make self-payment for services provided; and responsibility for payment of non-covered services.     Patient would like the video invitation sent by:  My Chart    Mode of Communication:  Video Conference via [facility]    Distant Location (Provider):  Off-site    As the provider I attest to compliance with applicable laws and regulations related to telemedicine.    Provider location: Off-Site    DATA  Interactive Complexity: No  Crisis: No        Progress Since Last Session (Related to Symptoms / Goals / Homework):   Symptoms: No change stable    Homework: Achieved / completed to satisfaction      Episode of Care  "Goals: Satisfactory progress - ACTION (Actively working towards change); Intervened by reinforcing change plan / affirming steps taken     Current / Ongoing Stressors and Concerns:   Not taking vyvanse. No cannabis.         ADHD Symptom History  Client's highest education level was some college. Computer forensics, . During the elementary, middle, and high school years, patient recalls academic strengths in the area of math and \"hands on\" activities. Client reported experiencing academic problems in reading, social studies, and re-reading things . Client did not identify any learning problems. Client did not receive tutoring services during the school years. Client did not receive special education services. Client reported significant behavior and discipline problems including: disruptive classroom behavior and loud and interrupted a lot. Doodles. Client did attend post secondary school.   Client reported difficulty with childhood peer relationships, moved a lot. Step-dad was in . As a child, client reported that he failed to complete assigned chores in the home environment, had problems getting ready for school in the morning, had problems with organization and keeping track of items, misplaced or lost things, needed frequent reminders by parents to be motivated or to complete work, displayed argumentative or oppositional behaviors, had problems managing temper with frequent emotional outbursts, had difficulty managing personal hygiene, and taking things apart .   Client reported that he is currently employed. Client reported that the current job is a good fit for her skills and personality.  Client reported that she often felt bored, disorganized behavior, and distractible behavior .  If it's not hands on she struggles. The client's work history includes: , , , dietician assistant.  The longest period of employment has been current job 7 " years. .  Client has been terminated from a place of employment. 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  Client reported currently experiencing sleep disturbance, including: daytime drowsiness / fatigue, frequent dreams / nightmares, and insomnia.  Client reported sleeping approximately 3-8 hours per night. Works nights, highly variable scleep schedule.  Client reported that he has not completed a sleep study.  Client reported having an inconsistent diet.  There are significant nutritional concerns.  Client reported  excessive exercise as purging .      Motor Vehicle Operation:  Client has received a 's license.  Client has received moving violations, including: multiple not at fault accidents speeding tickets and    Client reported the following driving habits: experiencces road rage and often exceeds the speed limit / speeds.  According to client, other people are comfortable riding as a passenger when he is driving.       Treatment Objective(s) Addressed in This Session:   Assessment  for ADHD in existing patient       Intervention:   Assessment    Assessments completed prior to visit:  The following assessments were completed by patient for this visit:  PHQ9:       5/24/2013     8:01 AM 7/11/2018     7:28 AM 10/13/2023     1:16 AM 11/4/2023     1:53 PM   PHQ-9 SCORE   PHQ-9 Total Score 14      PHQ-9 Total Score MyChart  5 (Mild depression) 5 (Mild depression) 6 (Mild depression)   PHQ-9 Total Score  5 5 6     GAD7:       5/24/2013     9:27 AM 7/11/2018     7:30 AM 11/4/2023     1:54 PM   VENITA-7 SCORE   Total Score 13     Total Score  7 (mild anxiety) 11 (moderate anxiety)   Total Score  7 11     CAGE-AID:       9/22/2023     5:26 PM   CAGE-AID Total Score   Total Score 0   Total Score MyChart 0 (A total score of 2 or greater is  considered clinically significant)     PROMIS 10-Global Health (all questions and answers displayed):       9/22/2023     5:25 PM 10/13/2023     1:20 AM   PROMIS 10   In general, would you say your health is: Fair Fair   In general, would you say your quality of life is: Very good Very good   In general, how would you rate your physical health? Poor Good   In general, how would you rate your mental health, including your mood and your ability to think? Very good Very good   In general, how would you rate your satisfaction with your social activities and relationships? Fair Good   In general, please rate how well you carry out your usual social activities and roles Excellent Good   To what extent are you able to carry out your everyday physical activities such as walking, climbing stairs, carrying groceries, or moving a chair? Completely Completely   In the past 7 days, how often have you been bothered by emotional problems such as feeling anxious, depressed, or irritable? Sometimes Never   In the past 7 days, how would you rate your fatigue on average? Moderate Mild   In the past 7 days, how would you rate your pain on average, where 0 means no pain, and 10 means worst imaginable pain? 0 0   In general, would you say your health is: 2 2   In general, would you say your quality of life is: 4 4   In general, how would you rate your physical health? 1 3   In general, how would you rate your mental health, including your mood and your ability to think? 4 4   In general, how would you rate your satisfaction with your social activities and relationships? 2 3   In general, please rate how well you carry out your usual social activities and roles. (This includes activities at home, at work and in your community, and responsibilities as a parent, child, spouse, employee, friend, etc.) 5 3   To what extent are you able to carry out your everyday physical activities such as walking, climbing stairs, carrying groceries, or  moving a chair? 5 5   In the past 7 days, how often have you been bothered by emotional problems such as feeling anxious, depressed, or irritable? 3 1   In the past 7 days, how would you rate your fatigue on average? 3 2   In the past 7 days, how would you rate your pain on average, where 0 means no pain, and 10 means worst imaginable pain? 0 0   Global Mental Health Score 13 16   Global Physical Health Score 14 17   PROMIS TOTAL - SUBSCORES 27 33     [place] Suicide Severity Rating Scale (Lifetime/Recent)      11/8/2023     5:00 PM   [place] Suicide Severity Rating (Lifetime/Recent)   Q1 Wished to be Dead (Past Month) no   Q2 Suicidal Thoughts (Past Month) no   Q6 Suicide Behavior (Lifetime) no         ASSESSMENT: Current Emotional / Mental Status (status of significant symptoms):   Risk status (Self / Other harm or suicidal ideation)   Patient denies current fears or concerns for personal safety.   Patient denies current or recent suicidal ideation or behaviors.   Patient denies current or recent homicidal ideation or behaviors.   Patient denies current or recent self injurious behavior or ideation.   Patient denies other safety concerns.   Patient reports there has been no change in risk factors since their last session.     Patient reports there has been no change in protective factors since their last session.     Recommended that patient call 911 or go to the local ED should there be a change in any of these risk factors.     Appearance:   Appropriate    Eye Contact:   Good    Psychomotor Behavior: Normal    Attitude:   Cooperative    Orientation:   All   Speech    Rate / Production: Normal     Volume:  Normal    Mood:    Normal   Affect:    Appropriate    Thought Content:  Clear    Thought Form:  Coherent  Logical    Insight:    Good      Medication Review:   No changes to current psychiatric medication(s)     Medication Compliance:   NA     Changes in Health Issues:   None reported     Chemical Use  Review:   Substance Use: Chemical use reviewed, no active concerns identified      Tobacco Use: No current tobacco use.      Diagnosis:  No diagnosis found.    Collateral Reports Completed:   Not Applicable    PLAN: (Patient Tasks / Therapist Tasks / Other)  Assigned CNSVS assessment, client to complete before out next session        Jesús Gonzales, PhD                                                         ______________________________________________________________________    Individual Treatment Plan    Patient's Name: Vin Unger  YOB: 1990    Date of Creation: 1/3/24  Date Treatment Plan Last Reviewed/Revised: 1/3/24    DSM5 Diagnoses: Attention-Deficit/Hyperactivity Disorder  314.01 (F90.2) Combined presentation or 307.51 (F50.8) Binge-Eating Disorder In partial remission  Severity: Moderate  Psychosocial / Contextual Factors: associated depression and anxiety  PROMIS (reviewed every 90 days):     Referral / Collaboration:  Referral to another professional/service is not indicated at this time..    Anticipated number of session for this episode of care: 15  Anticipation frequency of session: Monthly  Anticipated Duration of each session: 38-52 minutes  Treatment plan will be reviewed in 90 days or when goals have been changed.       ADHD Treatment plan:  Education- the Biopsychosocial model of ADHD  Client will be able to describe in general terms what ADHD is and is not  Client will be able to describe how ADHD has affected their life in at least two different areas, such as school or work and Home/relationships  Clients parents/guardians or significant others will be provided information on what ADHD is and the ways it can affect relationships (see ADHD reading list) and be encouraged to be a part of clients treatment team.  Medication  If appropriate, Client will participate in medication evaluation for ADHD symptoms and follow medication recommendations.   Behavior change  Using  materials form ADHD reading list or others recommended by the therapist, patient will identify and implement at least three behavior changes, (e.g. filing, use of a day planner, putting keys in the same place every day) which will improve organization.  Comorbid conditions   Client and therapist will asses for comorbid conditions (e.g. anxiety, depression, substance use). and add additional items to treatment plan as needed  Self-care  Client will identify 3 things they can do just for themselves  Client will take time for quiet, reflection, meditation 3 times per week  Client will Learn to set boundaries when appropriate  Client will Identify 2 individuals they can call on for support, distraction  Behavioral Activation  Client will Identify two forms of exercise/activity and engage in them 3 times per week  Client will Identify 2 things that make them laugh, and engage in these 3 times per week.  Client will Identify 2 Creative activities or hobbies  and engage in them 2 times per week  Client will identify music, movies, books that make them feel good and use them 3 times per week  Assessment of progress  Client will engage in assessment of their progress on a regular basis     Patient has reviewed and agreed to the above plan.      Jesús Gonzales, PhD  January 3, 2024

## 2024-01-08 ENCOUNTER — HOSPITAL ENCOUNTER (OUTPATIENT)
Dept: NUTRITION | Facility: CLINIC | Age: 34
Discharge: HOME | End: 2024-01-08
Admitting: FAMILY MEDICINE
Payer: COMMERCIAL

## 2024-01-08 PROCEDURE — 97803 MED NUTRITION INDIV SUBSEQ: CPT | Mod: GT,95 | Performed by: DIETITIAN, REGISTERED

## 2024-01-08 NOTE — PROGRESS NOTES
Virtual Visit Details    Type of service:  Video Visit     Originating Location (pt. Location): Home    Distant Location (provider location):  On-site  Platform used for Video Visit: [facility]     OUTPATIENT NUTRITION REASSESSMENT   REASON FOR ASSESSMENT  Vin Unger referred by Juan Clark PA-C for MNT related to   Binge eating disorder [F50.81]  - Primary      VENITA (generalized anxiety disorder) [F41.1]      .    Patient accompanied by self       ASSESSMENT   Nutrition History:  - Information obtained from patient.  - Patient is on a regular diet at home.  Patient has struggled with food since childhood.  Patient lives with partner and 14 year old son.  Patient's struggle with food has increased in the past 2 years.       Compensatory behaviors -exercise and restriction   Explored options of eating 2 years ago when was in CBT and DBT but felt like too much at that time     10 years old -mom with yoyo dieting   15-16 years old cookout -was told by friend's mother -you need to slow down as would eat (2nd, 3rd and 4th and 5th portions).  Told parent would then restrict.  Friend's parent said patient likely has ED.       Health and weight impacted decision to address binge eating. Patient liked to go to buffets so could binge but Covid shutdowns no longer allowed binge eating so it became more expensive to binge      Last binge 2 days ago. Patient states over cooks so can over eat.  Patient eats until uncomfortably full.      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)     Sleeps 6 AM-12-1 PM during the summer   School sleeps 7:45-1:50 as drops son off at school      70-75% of day thinking about food   Thinks about body once eats a lot  Does not weigh   No purging, no laxatives   Diet pills -hydroxy cut -years ago   Stimulants pre-workout for the past 2 years thermogenics -150 mg caffeine      Sitting at table with family to eat first meal  Sitting at work desk for lunch   Eats rapidly 15 minutes   Wasn't allowed snack foods as  kid   Only ate at designated eating times -didn't get to choose foods and sat at table until completed meal   Eats until 80% full or full with first meal   Stuffed at 12 PM meal      Protein bar-the sweet      Fasting every other day -walking dog 1-2 miles (every other day) family get together will each past 2 years      9/11/2023 Patient with increased stress with trying to reduce exercise.  Patient reduced 2 hours of intense exercise by switching to walking her puppy.  Patient has not been negotiating calories.  Patient binges every other day.  Patient states binge can happen without intent.  Patient usually binges on meat.  Last binge with making lasagna.  Patient limits chips and cookies in her home.  Patient's biggest concern is whether her insurance will cover appointments.  Patient will be meeting with therapist at the end of the month.        10/23/2023 Patient frustrated that she does not have a path moving forward with her binge eating.  Patient had first appointment with her therapist.  Patient continues to struggle with binge eating.  Patient binge eats daily.  Patient tried setting timer for binge but found that just raced to have binge.  Patient feels that exercise has been less helpful for weight management so switched to eating less calorie dense foods.  During the summer patient was eating watermelon.  Patient now eating 1 head lettuce, 2 tomatoes, 1 cucumber and avocado for binge during her lunch at work.  Patient eats evening meal with her son and will be planning what she will binge on during work.  Patient with strong eating disorder thoughts. Patient frustrated that could have just taken Wegovy to reduce appetite and lose weight.        12/13/2023 Feeling more tired with Vyvanse; every other day binge eating -yesterday-cowboy cavier (2-3 cans beans, 4 tomatoes, 3-4 avocado, 2 bags frozen corn, 1 bag frozen peas)/vegetable salsa + corn tortilla; pizza + 4 orange; more consistent timing with  eating lunchtime; wakes up 1:50 PM coffee. Patient would like to change relationship with exercise to focus on strength, etc vs exercise that will burn the most calories.   4:00-5:00 PM breakfast   12:30 AM lunch   5:00-6:00 AM 5-6 Eggs -green bell pepper      2-3 hours exercise   1.5 hours exercise   Walked dog after brought (1 hour)     1/8/2024 Patient used cost analysis for binge and realized wasn't spending too much on food so did not cause patient to limit purchases.  Patient feels binge eating is habit and not drive from emotional standpoint or diet perspective.  Patient bought binge eating workbook and trying to start reading it.  Patient states she has difficulty focusing when trying to read.  Patient being assessed for ADHD.  Patient feels grew up trying different diets with her mom since age (as seen on TV diets).  Patient continues with binge every other day.  Patient bargains with self daily with food and exercise.  Patient wants to add exercise to increase calorie burn with more muscle mass.      Diet Recall:  Breakfast: none    Lunch: 12:00-12:30 anything in the fridge -burrito (rice, black beans, shredded chicken) wrapped in burrito tortilla with guacamole; states size of loaf bread    Dinner: pasta-karlie and lasgana; rice and chicken; salad (spinach, green pepper, tomato, boiled eggs) 1 head iceberg + olives; eggs favorite (5:00-6:00 PM)   Snack: popcorn; pear or hard boiled egg  Beverages: coffee 6-8 cups per day (just likes does not use for energy)   Dining out: varies           Exercise: Every other day-exercises 2 times per day; while working will do lunges and push ups   2-3 hours in AM or 90 minutes in the afternoon + adding movement in tasks (patient has been using exercise as way to burn calories for the past 2 years)      NUTRITION FOCUSED PHYSICAL ASSESSMENT (NFPA) FOR DIAGNOSING MALNUTRITION  Yes         Observed:   No nutrition-related physical findings observed     Obtained from  "Chart/Interdisciplinary Team:  None noted      LABS  Labs reviewed     MEDICATIONS  Medications reviewed     ANTHROPOMETRICS   Height: 5'7\"  Weight: 214 lbs   BMI (kg/m2): 33.5 kg/m2   Weight Status:  Obesity Grade I BMI 30-34.9  %IBW: 151%  Weight History:   Wt Readings from Last 10 Encounters:   11/07/23 97.1 kg (214 lb)   09/14/23 92.8 kg (204 lb 8 oz)   06/29/23 93 kg (205 lb)   11/03/22 88.1 kg (194 lb 4.8 oz)   11/11/21 78.7 kg (173 lb 8 oz)   02/24/21 76.2 kg (168 lb)   01/27/21 76.2 kg (168 lb)   12/09/20 76.3 kg (168 lb 4 oz)   08/26/19 71.9 kg (158 lb 8 oz)   09/10/18 71.7 kg (158 lb)         ASSESSED NUTRITION NEEDS  Estimated Energy Needs: 0195-3706 kcals/day (14-17 Kcal/Kg)  Justification:  (obese)  Estimated Protein Needs: 61-73 grams protein/day (1-1.2 g pro/Kg)  Justification:  (preservation of lean body mass)  Estimated Fluid Needs: 1336-8529 mL/day (30-35 mL/kg)     ASSESSED MALNUTRITION STATUS  % Weight Loss:  None noted  % Intake:  No decreased intake noted  Subcutaneous Fat Loss:  None observed  Loss of Muscle Mass:  None observed  Fluid Retention:  None noted     Malnutrition Diagnosis:  Patient does not meet two of the above criteria necessary for diagnosing malnutrition     EVALUATION/PROGRESS TOWARDS GOALS  Previous nutrition goals:  Try cost analysis to quantify binge -met  Purchase binge eating workbook -met      Previous nutrition diagnosis: Disordered eating pattern related to preoccupation with food as evidenced by current binge behaviors, restriction and excessive exercise -no change      Current nutrition diagnosis: Disordered eating pattern related to preoccupation with food as evidenced by current binge behaviors, restriction and excessive exercise        INTERVENTIONS   Nutrition Prescription   Recommend normalized eating pattern        IMPLEMENTATION   Assessed learning needs and learning preference  Teaching Method(s) used: Explanation  Vitamin and Mineral Supplements: recommend " complete multivitamin and mineral   Diet Education:  Provided education on normalized eating pattern   Nutrition Education (Content):              a)  Discussed progress towards goals.  Reviewed normalized eating pattern.  Discussed options of ways to reduce binges.  Patient offered suggestions from CBT and DBT.  Encouraged patient to reduce volume of food during binge.  Discussed current compensatory behaviors and determined strategies for behavior change.  Reviewed exercise regimen being purge behavior.  Patient may need to consider higher level of care due to strong drive for binge eating.  Suggest gradual changes for long term healthy relationship with food and exercise.  Supported patient with her struggle with eating disorder.   Nutrition Education (Application):              a)  Discussed purchasing individual portions for meals to reduce volume of food.               b)  Patient verbalizes understanding of diet by stating will try ice to distract from binge.  Expected patient engagement: good   Other: Patient to continue working with therapist.      GOALS  Use ice prior to preparing meals and prior to eating  Put mixing bowl in son's room      FOLLOW UP/MONITORING   Progress towards goals will be monitored and evaluated per protocol and Practice Guidelines  Patient to follow up in 4 weeks   RD name and number provided     Time Spent with Patient  30  minutes      Mike Huddleston, BRANDYN, MILLI  [facility] Outpatient Dietitian  [phone] (office phone)      Mike Huddleston RD, MILLI  [facility] Outpatient Dietitian  [phone] (office phone)

## 2024-02-07 ENCOUNTER — VIRTUAL VISIT (OUTPATIENT)
Dept: PSYCHOLOGY | Facility: CLINIC | Age: 34
End: 2024-02-07
Payer: COMMERCIAL

## 2024-02-07 DIAGNOSIS — F50.819 BINGE EATING DISORDER: ICD-10-CM

## 2024-02-07 DIAGNOSIS — F90.2 ADHD (ATTENTION DEFICIT HYPERACTIVITY DISORDER), COMBINED TYPE: Primary | ICD-10-CM

## 2024-02-07 PROCEDURE — 90834 PSYTX W PT 45 MINUTES: CPT | Mod: 95 | Performed by: PSYCHOLOGIST

## 2024-02-07 NOTE — PROGRESS NOTES
[facility]                                     Progress Note    Disclaimer: Voice recognition software was used to generate this note. As a result, wrong word or 'sound-a-like' substitutions may have occurred due to the inherent limitations of voice recognition software. There may be errors in the script that have gone undetected. Please consider this when interpreting information found in this chart.     Patient Name: Vin Unger  Date: February 7, 2024           Service Type: Individual      Session Start Time: 5:06 PM    Session End Time: 5:45 PM       Session Length: 45    Session #: 6    Attendees: Client attended alone    Service Modality:  Video Visit:      Provider verified identity through the following two step process.  Patient provided:  Patient is known previously to provider    Telemedicine Visit: The patient's condition can be safely assessed and treated via synchronous audio and visual telemedicine encounter.      Reason for Telemedicine Visit: Services only offered telehealth    Originating Site (Patient Location): Patient's home    Distant Site (Provider Location): Provider Remote Setting- Home Office    Consent:  The patient/guardian has verbally consented to: the potential risks and benefits of telemedicine (video visit) versus in person care; bill my insurance or make self-payment for services provided; and responsibility for payment of non-covered services.     Patient would like the video invitation sent by:  My Chart    Mode of Communication:  [facility]    Distant Location (Provider):  Off-site    As the provider I attest to compliance with applicable laws and regulations related to telemedicine.    Provider location: Off-Site    DATA  Interactive Complexity: No  Crisis: No        Progress Since Last Session (Related to Symptoms / Goals / Homework):   Symptoms: No change stable    Homework: Achieved / completed to satisfaction      Episode of  "Care Goals: Satisfactory progress - ACTION (Actively working towards change); Intervened by reinforcing change plan / affirming steps taken     Current / Ongoing Stressors and Concerns:   Enjoying warm weather.    Partner wanted her to bring up impulse buying for others. Doesn't really impact finances or relationship.   Recalls being homeless on 2 different occasions for 3 and six months respectively. Second time with her son.    Reviewed CNSVS testing. Will review Maria Eugenia  scales next time              ADHD Symptom History  Client's highest education level was some college. Computer forensics, . During the elementary, middle, and high school years, patient recalls academic strengths in the area of math and \"hands on\" activities. Client reported experiencing academic problems in reading, social studies, and re-reading things . Client did not identify any learning problems. Client did not receive tutoring services during the school years. Client did not receive special education services. Client reported significant behavior and discipline problems including: disruptive classroom behavior and loud and interrupted a lot. Doodles. Client did attend post secondary school.   Client reported difficulty with childhood peer relationships, moved a lot. Step-dad was in . As a child, client reported that he failed to complete assigned chores in the home environment, had problems getting ready for school in the morning, had problems with organization and keeping track of items, misplaced or lost things, needed frequent reminders by parents to be motivated or to complete work, displayed argumentative or oppositional behaviors, had problems managing temper with frequent emotional outbursts, had difficulty managing personal hygiene, and taking things apart .   Client reported that he is currently employed. Client reported that the current job is a good fit for her skills and personality.  Client reported that " she often felt bored, disorganized behavior, and distractible behavior .  If it's not hands on she struggles. The client's work history includes: , , , dietician assistant.  The longest period of employment has been current job 7 years. .  Client has been terminated from a place of employment. 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    Health Habits: As a child, client reported having sleep disturbance, including: frequent dreams / nightmares, sleep walking, and talking in her sleep excessively .  Client reported currently experiencing sleep disturbance, including: daytime drowsiness / fatigue, frequent dreams / nightmares, and insomnia.  Client reported sleeping approximately 3-8 hours per night. Works nights, highly variable scleep schedule.  Client reported that he has not completed a sleep study.  Client reported having an inconsistent diet.  There are significant nutritional concerns.  Client reported  excessive exercise as purging .      Motor Vehicle Operation:  Client has received a 's license.  Client has received moving violations, including: multiple not at fault accidents speeding tickets and    Client reported the following driving habits: experiencces road rage and often exceeds the speed limit / speeds.  According to client, other people are comfortable riding as a passenger when he is driving.       Treatment Objective(s) Addressed in This Session:   Assessment  for ADHD in existing patient       Intervention:   Assessment    Assessments completed prior to visit:  The following assessments were completed by patient for this visit:  PHQ9:       5/24/2013     8:01 AM 7/11/2018     7:28 AM 10/13/2023     1:16 AM 11/4/2023     1:53 PM   PHQ-9 SCORE   PHQ-9 Total Score 14      PHQ-9 Total Score MyChart  5 (Mild depression) 5 (Mild depression) 6 (Mild depression)   PHQ-9 Total Score  5 5 6     GAD7:       5/24/2013     9:27 AM 7/11/2018      7:30 AM 11/4/2023     1:54 PM   VENITA-7 SCORE   Total Score 13     Total Score  7 (mild anxiety) 11 (moderate anxiety)   Total Score  7 11     CAGE-AID:       9/22/2023     5:26 PM   CAGE-AID Total Score   Total Score 0   Total Score MyChart 0 (A total score of 2 or greater is considered clinically significant)     PROMIS 10-Global Health (all questions and answers displayed):       9/22/2023     5:25 PM 10/13/2023     1:20 AM 2/2/2024     1:41 AM   PROMIS 10   In general, would you say your health is: Fair Fair Good   In general, would you say your quality of life is: Very good Very good Good   In general, how would you rate your physical health? Poor Good Fair   In general, how would you rate your mental health, including your mood and your ability to think? Very good Very good Very good   In general, how would you rate your satisfaction with your social activities and relationships? Fair Good Fair   In general, please rate how well you carry out your usual social activities and roles Excellent Good Fair   To what extent are you able to carry out your everyday physical activities such as walking, climbing stairs, carrying groceries, or moving a chair? Completely Completely Completely   In the past 7 days, how often have you been bothered by emotional problems such as feeling anxious, depressed, or irritable? Sometimes Never Sometimes   In the past 7 days, how would you rate your fatigue on average? Moderate Mild Moderate   In the past 7 days, how would you rate your pain on average, where 0 means no pain, and 10 means worst imaginable pain? 0 0 0   In general, would you say your health is: 2 2 3   In general, would you say your quality of life is: 4 4 3   In general, how would you rate your physical health? 1 3 2   In general, how would you rate your mental health, including your mood and your ability to think? 4 4 4   In general, how would you rate your satisfaction with your social activities and relationships? 2  3 2   In general, please rate how well you carry out your usual social activities and roles. (This includes activities at home, at work and in your community, and responsibilities as a parent, child, spouse, employee, friend, etc.) 5 3 2   To what extent are you able to carry out your everyday physical activities such as walking, climbing stairs, carrying groceries, or moving a chair? 5 5 5   In the past 7 days, how often have you been bothered by emotional problems such as feeling anxious, depressed, or irritable? 3 1 3   In the past 7 days, how would you rate your fatigue on average? 3 2 3   In the past 7 days, how would you rate your pain on average, where 0 means no pain, and 10 means worst imaginable pain? 0 0 0   Global Mental Health Score 13 16 12   Global Physical Health Score 14 17 15   PROMIS TOTAL - SUBSCORES 27 33 27     [place] Suicide Severity Rating Scale (Lifetime/Recent)      11/8/2023     5:00 PM   [place] Suicide Severity Rating (Lifetime/Recent)   Q1 Wished to be Dead (Past Month) no   Q2 Suicidal Thoughts (Past Month) no   Q6 Suicide Behavior (Lifetime) no         ASSESSMENT: Current Emotional / Mental Status (status of significant symptoms):   Risk status (Self / Other harm or suicidal ideation)   Patient denies current fears or concerns for personal safety.   Patient denies current or recent suicidal ideation or behaviors.   Patient denies current or recent homicidal ideation or behaviors.   Patient denies current or recent self injurious behavior or ideation.   Patient denies other safety concerns.   Patient reports there has been no change in risk factors since their last session.     Patient reports there has been no change in protective factors since their last session.     Recommended that patient call 911 or go to the local ED should there be a change in any of these risk factors.     Appearance:   Appropriate    Eye Contact:   Good    Psychomotor Behavior: Normal     Attitude:   Cooperative    Orientation:   All   Speech    Rate / Production: Normal     Volume:  Normal    Mood:    Normal   Affect:    Appropriate    Thought Content:  Clear    Thought Form:  Coherent  Logical    Insight:    Good      Medication Review:   No changes to current psychiatric medication(s)     Medication Compliance:   NA     Changes in Health Issues:   None reported     Chemical Use Review:   Substance Use: Chemical use reviewed, no active concerns identified      Tobacco Use: No current tobacco use.      Diagnosis:  1. ADHD (attention deficit hyperactivity disorder), combined type    2. Binge eating disorder        Collateral Reports Completed:   Not Applicable    PLAN: (Patient Tasks / Therapist Tasks / Other)  Assigned CNSVS assessment, client to complete before out next session        Jesús Gonzales, PhD                                                         ______________________________________________________________________    Individual Treatment Plan    Patient's Name: Vin Unger  YOB: 1990    Date of Creation: 1/3/24  Date Treatment Plan Last Reviewed/Revised: 1/3/24    DSM5 Diagnoses: Attention-Deficit/Hyperactivity Disorder  314.01 (F90.2) Combined presentation or 307.51 (F50.8) Binge-Eating Disorder In partial remission  Severity: Moderate  Psychosocial / Contextual Factors: associated depression and anxiety  PROMIS (reviewed every 90 days):     Referral / Collaboration:  Referral to another professional/service is not indicated at this time..    Anticipated number of session for this episode of care: 15  Anticipation frequency of session: Monthly  Anticipated Duration of each session: 38-52 minutes  Treatment plan will be reviewed in 90 days or when goals have been changed.       ADHD Treatment plan:  Education- the Biopsychosocial model of ADHD  Client will be able to describe in general terms what ADHD is and is not  Client will be able to describe how ADHD  has affected their life in at least two different areas, such as school or work and Home/relationships  Clients parents/guardians or significant others will be provided information on what ADHD is and the ways it can affect relationships (see ADHD reading list) and be encouraged to be a part of clients treatment team.  Medication  If appropriate, Client will participate in medication evaluation for ADHD symptoms and follow medication recommendations.   Behavior change  Using materials form ADHD reading list or others recommended by the therapist, patient will identify and implement at least three behavior changes, (e.g. filing, use of a day planner, putting keys in the same place every day) which will improve organization.  Comorbid conditions   Client and therapist will asses for comorbid conditions (e.g. anxiety, depression, substance use). and add additional items to treatment plan as needed  Self-care  Client will identify 3 things they can do just for themselves  Client will take time for quiet, reflection, meditation 3 times per week  Client will Learn to set boundaries when appropriate  Client will Identify 2 individuals they can call on for support, distraction  Behavioral Activation  Client will Identify two forms of exercise/activity and engage in them 3 times per week  Client will Identify 2 things that make them laugh, and engage in these 3 times per week.  Client will Identify 2 Creative activities or hobbies  and engage in them 2 times per week  Client will identify music, movies, books that make them feel good and use them 3 times per week  Assessment of progress  Client will engage in assessment of their progress on a regular basis     Patient has reviewed and agreed to the above plan.      Jesús Gonzales, PhD  January 3, 2024

## 2024-02-08 ENCOUNTER — OFFICE VISIT (OUTPATIENT)
Dept: FAMILY MEDICINE | Facility: OTHER | Age: 34
End: 2024-02-08
Attending: PHYSICIAN ASSISTANT
Payer: COMMERCIAL

## 2024-02-08 VITALS
HEART RATE: 78 BPM | DIASTOLIC BLOOD PRESSURE: 68 MMHG | WEIGHT: 213 LBS | BODY MASS INDEX: 33.43 KG/M2 | OXYGEN SATURATION: 98 % | RESPIRATION RATE: 18 BRPM | HEIGHT: 67 IN | SYSTOLIC BLOOD PRESSURE: 118 MMHG | TEMPERATURE: 97.5 F

## 2024-02-08 DIAGNOSIS — F41.1 GAD (GENERALIZED ANXIETY DISORDER): ICD-10-CM

## 2024-02-08 DIAGNOSIS — F50.819 BINGE EATING DISORDER: Primary | ICD-10-CM

## 2024-02-08 DIAGNOSIS — E78.5 HYPERLIPIDEMIA LDL GOAL <130: ICD-10-CM

## 2024-02-08 PROBLEM — T36.95XD: Status: RESOLVED | Noted: 2021-01-27 | Resolved: 2024-02-08

## 2024-02-08 PROCEDURE — 99213 OFFICE O/P EST LOW 20 MIN: CPT | Performed by: PHYSICIAN ASSISTANT

## 2024-02-08 NOTE — PROGRESS NOTES
"  Assessment & Plan     Binge eating disorder  Patient continues to work with counseling in regards to this.  She is little hesitant to keep going forward since she has opened up to her family and they have treated her with less than her due respect by report.  Advised that she continue to move forward because this is the wise thing to do.  Labs pending.  Follow-up based on results.  - Hemoglobin; Future  - Comprehensive metabolic panel (BMP + Alb, Alk Phos, ALT, AST, Total. Bili, TP); Future  - Lipid panel reflex to direct LDL Fasting; Future    VENITA (generalized anxiety disorder)  Still somewhat problematic.  Follow-up with counseling and consider psychiatric evaluation.  - Hemoglobin; Future  - Comprehensive metabolic panel (BMP + Alb, Alk Phos, ALT, AST, Total. Bili, TP); Future  - Lipid panel reflex to direct LDL Fasting; Future    Hyperlipidemia LDL goal <130  Related labs pending.  Follow-up based on results.  - Hemoglobin; Future  - Comprehensive metabolic panel (BMP + Alb, Alk Phos, ALT, AST, Total. Bili, TP); Future  - Lipid panel reflex to direct LDL Fasting; Future    BMI  Estimated body mass index is 33.43 kg/m  as calculated from the following:    Height as of this encounter: 1.7 m (5' 6.93\").    Weight as of this encounter: 96.6 kg (213 lb).   Weight management plan: Discussed healthy diet and exercise guidelines      Work on weight loss  Regular exercise  Rainer Banuelos is a 33 year old, presenting for the following health issues:  RECHECK (Binge eating disorder)      2/8/2024     7:09 AM   Additional Questions   Roomed by Ana MONTANA   Accompanied by self     History of Present Illness       Reason for visit:  Binge eating disorder follow-up    She eats 2-3 servings of fruits and vegetables daily.She consumes 0 sweetened beverage(s) daily.She exercises with enough effort to increase her heart rate 60 or more minutes per day.  She exercises with enough effort to increase her heart rate 5 days per " "week. She is missing 2 dose(s) of medications per week.  She is not taking prescribed medications regularly due to remembering to take.     \"Just here because I need to be.\" No other concerns or questions.      Objective    /68   Pulse 78   Temp 97.5  F (36.4  C) (Temporal)   Resp 18   Ht 1.7 m (5' 6.93\")   Wt 96.6 kg (213 lb)   LMP 01/22/2024   SpO2 98%   BMI 33.43 kg/m    Body mass index is 33.43 kg/m .  Physical Exam   GENERAL: alert and no distress  NECK: no adenopathy, no asymmetry, masses, or scars  RESP: lungs clear to auscultation - no rales, rhonchi or wheezes  CV: regular rate and rhythm, normal S1 S2, no S3 or S4, no murmur, click or rub, no peripheral edema  ABDOMEN: soft, nontender, no hepatosplenomegaly, no masses and bowel sounds normal  MS: no gross musculoskeletal defects noted, no edema    No results found for this or any previous visit (from the past 24 hour(s)).        Signed Electronically by: Juan Riley PA-C    "

## 2024-02-09 ENCOUNTER — HOSPITAL ENCOUNTER (OUTPATIENT)
Dept: NUTRITION | Facility: CLINIC | Age: 34
Discharge: HOME | End: 2024-02-09
Attending: DIETITIAN, REGISTERED | Admitting: DIETITIAN, REGISTERED
Payer: COMMERCIAL

## 2024-02-09 PROCEDURE — 97803 MED NUTRITION INDIV SUBSEQ: CPT | Mod: GT,95 | Performed by: DIETITIAN, REGISTERED

## 2024-02-09 NOTE — PROGRESS NOTES
Virtual Visit Details    Type of service:  Video Visit     Originating Location (pt. Location): Home    Distant Location (provider location):  On-site  Platform used for Video Visit: [facility]     OUTPATIENT NUTRITION REASSESSMENT   REASON FOR ASSESSMENT  Vin Unger referred by Juan Clark PA-C for MNT related to   Binge eating disorder [F50.81]  - Primary      VENITA (generalized anxiety disorder) [F41.1]      .    Patient accompanied by self       ASSESSMENT   Nutrition History:  - Information obtained from patient.  - Patient is on a regular diet at home.  Patient has struggled with food since childhood.  Patient lives with partner and 14 year old son.  Patient's struggle with food has increased in the past 2 years.       Compensatory behaviors -exercise and restriction   Explored options of eating 2 years ago when was in CBT and DBT but felt like too much at that time     10 years old -mom with yoyo dieting   15-16 years old cookout -was told by friend's mother -you need to slow down as would eat (2nd, 3rd and 4th and 5th portions).  Told parent would then restrict.  Friend's parent said patient likely has ED.       Health and weight impacted decision to address binge eating. Patient liked to go to buffets so could binge but Covid shutdowns no longer allowed binge eating so it became more expensive to binge      Last binge 2 days ago. Patient states over cooks so can over eat.  Patient eats until uncomfortably full.      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)     Sleeps 6 AM-12-1 PM during the summer   School sleeps 7:45-1:50 as drops son off at school      70-75% of day thinking about food   Thinks about body once eats a lot  Does not weigh   No purging, no laxatives   Diet pills -hydroxy cut -years ago   Stimulants pre-workout for the past 2 years thermogenics -150 mg caffeine      Sitting at table with family to eat first meal  Sitting at work desk for lunch   Eats rapidly 15 minutes   Wasn't allowed snack foods as  kid   Only ate at designated eating times -didn't get to choose foods and sat at table until completed meal   Eats until 80% full or full with first meal   Stuffed at 12 PM meal      Protein bar-the sweet      Fasting every other day -walking dog 1-2 miles (every other day) family get together will each past 2 years      9/11/2023 Patient with increased stress with trying to reduce exercise.  Patient reduced 2 hours of intense exercise by switching to walking her puppy.  Patient has not been negotiating calories.  Patient binges every other day.  Patient states binge can happen without intent.  Patient usually binges on meat.  Last binge with making lasagna.  Patient limits chips and cookies in her home.  Patient's biggest concern is whether her insurance will cover appointments.  Patient will be meeting with therapist at the end of the month.        10/23/2023 Patient frustrated that she does not have a path moving forward with her binge eating.  Patient had first appointment with her therapist.  Patient continues to struggle with binge eating.  Patient binge eats daily.  Patient tried setting timer for binge but found that just raced to have binge.  Patient feels that exercise has been less helpful for weight management so switched to eating less calorie dense foods.  During the summer patient was eating watermelon.  Patient now eating 1 head lettuce, 2 tomatoes, 1 cucumber and avocado for binge during her lunch at work.  Patient eats evening meal with her son and will be planning what she will binge on during work.  Patient with strong eating disorder thoughts. Patient frustrated that could have just taken Wegovy to reduce appetite and lose weight.        12/13/2023 Feeling more tired with Vyvanse; every other day binge eating -yesterday-cowboy cavier (2-3 cans beans, 4 tomatoes, 3-4 avocado, 2 bags frozen corn, 1 bag frozen peas)/vegetable salsa + corn tortilla; pizza + 4 orange; more consistent timing with  "eating lunchtime; wakes up 1:50 PM coffee. Patient would like to change relationship with exercise to focus on strength, etc vs exercise that will burn the most calories.   4:00-5:00 PM breakfast   12:30 AM lunch   5:00-6:00 AM 5-6 Eggs -green bell pepper      2-3 hours exercise   1.5 hours exercise   Walked dog after brought (1 hour)      1/8/2024 Patient used cost analysis for binge and realized wasn't spending too much on food so did not cause patient to limit purchases.  Patient feels binge eating is habit and not drive from emotional standpoint or diet perspective.  Patient bought binge eating workbook and trying to start reading it.  Patient states she has difficulty focusing when trying to read.  Patient being assessed for ADHD.  Patient feels grew up trying different diets with her mom since age (as seen on TV diets).  Patient continues with binge every other day.  Patient bargains with self daily with food and exercise.  Patient wants to add exercise to increase calorie burn with more muscle mass.      2/9/2024 Patient was able to put large salad bowls away.  Patient has eaten leftovers from large portions to replace large salad- hess lasagna; enchilada.  Patient eats meals in her office or kitchen. Patient has been able to distract from binge due to helping brother pack.  Patient's binge between 1 AM 3 AM \"lunch\" at work.  Patient to get labs drawn.     Patient has increased thoughts about exercise-more obsessive -weighing 1 time per week or every other day -how to not increase exercise while negating calories -outside with dogs 1 hour; walking pace outside     Diet Recall:  Breakfast: none    Lunch: 12:00-12:30 anything in the fridge -burrito (rice, black beans, shredded chicken) wrapped in burrito tortilla with guacamole; states size of loaf bread    Dinner: pasta-karlie and lasgana; rice and chicken; salad (spinach, green pepper, tomato, boiled eggs) 1 head iceberg + olives; eggs favorite (5:00-6:00 " "PM)   Snack: popcorn; pear or hard boiled egg  Beverages: coffee 6-8 cups per day (just likes does not use for energy)   Dining out: varies           Exercise: Every other day-exercises 2 times per day; while working will do lunges and push ups   2-3 hours in AM or 90 minutes in the afternoon + adding movement in tasks (patient has been using exercise as way to burn calories for the past 2 years)      NUTRITION FOCUSED PHYSICAL ASSESSMENT (NFPA) FOR DIAGNOSING MALNUTRITION  Yes         Observed:   No nutrition-related physical findings observed     Obtained from Chart/Interdisciplinary Team:  None noted      LABS  Labs reviewed     MEDICATIONS  Medications reviewed     ANTHROPOMETRICS   Height: 5'7\"  Weight: 213 lbs   BMI (kg/m2): 33.4 kg/m2   Weight Status:  Obesity Grade I BMI 30-34.9  %IBW: 151%  Weight History:   Wt Readings from Last 10 Encounters:   02/08/24 96.6 kg (213 lb)   11/07/23 97.1 kg (214 lb)   09/14/23 92.8 kg (204 lb 8 oz)   06/29/23 93 kg (205 lb)   11/03/22 88.1 kg (194 lb 4.8 oz)   11/11/21 78.7 kg (173 lb 8 oz)   02/24/21 76.2 kg (168 lb)   01/27/21 76.2 kg (168 lb)   12/09/20 76.3 kg (168 lb 4 oz)   08/26/19 71.9 kg (158 lb 8 oz)          ASSESSED NUTRITION NEEDS  Estimated Energy Needs: 3333-2315 kcals/day (14-17 Kcal/Kg)  Justification:  (obese)  Estimated Protein Needs: 61-73 grams protein/day (1-1.2 g pro/Kg)  Justification:  (preservation of lean body mass)  Estimated Fluid Needs: 4604-6955 mL/day (30-35 mL/kg)     ASSESSED MALNUTRITION STATUS  % Weight Loss:  None noted  % Intake:  No decreased intake noted  Subcutaneous Fat Loss:  None observed  Loss of Muscle Mass:  None observed  Fluid Retention:  None noted     Malnutrition Diagnosis:  Patient does not meet two of the above criteria necessary for diagnosing malnutrition     EVALUATION/PROGRESS TOWARDS GOALS  Previous nutrition goals:  Use ice prior to preparing meals and prior to eating-not met   Put mixing bowl in son's room-met    "   Previous nutrition diagnosis: Disordered eating pattern related to preoccupation with food as evidenced by current binge behaviors, restriction and excessive exercise -no change      Current nutrition diagnosis: Disordered eating pattern related to preoccupation with food as evidenced by current binge behaviors, restriction and excessive exercise        INTERVENTIONS   Nutrition Prescription   Recommend normalized eating pattern        IMPLEMENTATION   Assessed learning needs and learning preference  Teaching Method(s) used: Explanation  Vitamin and Mineral Supplements: recommend complete multivitamin and mineral   Diet Education:  Provided education on normalized eating pattern   Nutrition Education (Content):              a)  Discussed progress towards goals.  Reviewed normalized eating pattern.  Discussed options of ways to reduce binges.  Patient offered suggestions from CBT and DBT.  Encouraged patient to reduce volume of food during binge.  Discussed current compensatory behaviors and determined strategies for behavior change.  Reviewed exercise regimen being purge behavior.  Patient may need to consider higher level of care due to strong drive for binge eating.  Suggest gradual changes for long term healthy relationship with food and exercise.  Supported patient with her struggle with eating disorder.   Nutrition Education (Application):              a)  Discussed purchasing individual portions for meals to reduce volume of food.               b)  Patient verbalizes understanding of diet by stating will try ice to distract from binge.  Expected patient engagement: good   Other: Patient to continue working with therapist.      GOALS  Use post -it note as ice distraction reminder    Adjust recipe night before for school     FOLLOW UP/MONITORING   Progress towards goals will be monitored and evaluated per protocol and Practice Guidelines  Patient to follow up in 3 weeks   RD name and number provided     Time  Spent with Patient  30  minutes      Mike Huddleston, RD, LD  [facility] Outpatient Dietitian  [phone] (office phone)

## 2024-02-12 ENCOUNTER — LAB (OUTPATIENT)
Dept: LAB | Facility: OTHER | Age: 34
End: 2024-02-12
Payer: COMMERCIAL

## 2024-02-12 DIAGNOSIS — F50.819 BINGE EATING DISORDER: ICD-10-CM

## 2024-02-12 DIAGNOSIS — E78.5 HYPERLIPIDEMIA LDL GOAL <130: ICD-10-CM

## 2024-02-12 DIAGNOSIS — F41.1 GAD (GENERALIZED ANXIETY DISORDER): ICD-10-CM

## 2024-02-12 LAB
ALBUMIN SERPL BCG-MCNC: 4.7 G/DL (ref 3.5–5.2)
ALP SERPL-CCNC: 71 U/L (ref 40–150)
ALT SERPL W P-5'-P-CCNC: 19 U/L (ref 0–50)
ANION GAP SERPL CALCULATED.3IONS-SCNC: 13 MMOL/L (ref 7–15)
AST SERPL W P-5'-P-CCNC: 24 U/L (ref 0–45)
BILIRUB SERPL-MCNC: 0.6 MG/DL
BUN SERPL-MCNC: 14.7 MG/DL (ref 6–20)
CALCIUM SERPL-MCNC: 9.4 MG/DL (ref 8.6–10)
CHLORIDE SERPL-SCNC: 99 MMOL/L (ref 98–107)
CHOLEST SERPL-MCNC: 255 MG/DL
CREAT SERPL-MCNC: 0.89 MG/DL (ref 0.51–0.95)
DEPRECATED HCO3 PLAS-SCNC: 26 MMOL/L (ref 22–29)
EGFRCR SERPLBLD CKD-EPI 2021: 86 ML/MIN/1.73M2
FASTING STATUS PATIENT QL REPORTED: YES
GLUCOSE SERPL-MCNC: 104 MG/DL (ref 70–99)
HDLC SERPL-MCNC: 60 MG/DL
HGB BLD-MCNC: 13.9 G/DL (ref 11.7–15.7)
LDLC SERPL CALC-MCNC: 170 MG/DL
NONHDLC SERPL-MCNC: 195 MG/DL
POTASSIUM SERPL-SCNC: 3.7 MMOL/L (ref 3.4–5.3)
PROT SERPL-MCNC: 7.8 G/DL (ref 6.4–8.3)
SODIUM SERPL-SCNC: 138 MMOL/L (ref 135–145)
TRIGL SERPL-MCNC: 124 MG/DL

## 2024-02-12 PROCEDURE — 85018 HEMOGLOBIN: CPT

## 2024-02-12 PROCEDURE — 80053 COMPREHEN METABOLIC PANEL: CPT

## 2024-02-12 PROCEDURE — 80061 LIPID PANEL: CPT

## 2024-02-12 PROCEDURE — 36415 COLL VENOUS BLD VENIPUNCTURE: CPT

## 2024-02-15 DIAGNOSIS — E78.5 HYPERLIPIDEMIA LDL GOAL <130: Primary | ICD-10-CM

## 2024-02-15 RX ORDER — ROSUVASTATIN CALCIUM 5 MG/1
10 TABLET, COATED ORAL DAILY
Qty: 180 TABLET | Refills: 1 | Status: SHIPPED | OUTPATIENT
Start: 2024-02-15 | End: 2024-03-28 | Stop reason: SINTOL

## 2024-03-01 ENCOUNTER — HOSPITAL ENCOUNTER (OUTPATIENT)
Dept: NUTRITION | Facility: CLINIC | Age: 34
Discharge: HOME | End: 2024-03-01
Attending: DIETITIAN, REGISTERED | Admitting: DIETITIAN, REGISTERED
Payer: COMMERCIAL

## 2024-03-01 PROCEDURE — 97803 MED NUTRITION INDIV SUBSEQ: CPT | Mod: GT,95 | Performed by: DIETITIAN, REGISTERED

## 2024-03-01 NOTE — PROGRESS NOTES
Virtual Visit Details    Type of service:  Video Visit     Originating Location (pt. Location): Home    Distant Location (provider location):  On-site  Platform used for Video Visit: [facility]     OUTPATIENT NUTRITION REASSESSMENT   REASON FOR ASSESSMENT  Vin Unger referred by Juan Clark PA-C for MNT related to   Binge eating disorder [F50.81]  - Primary      VENITA (generalized anxiety disorder) [F41.1]      .    Patient accompanied by self       ASSESSMENT   Nutrition History:  - Information obtained from patient.  - Patient is on a regular diet at home.  Patient has struggled with food since childhood.  Patient lives with partner and 14 year old son.  Patient's struggle with food has increased in the past 2 years.       Compensatory behaviors -exercise and restriction   Explored options of eating 2 years ago when was in CBT and DBT but felt like too much at that time     10 years old -mom with yoyo dieting   15-16 years old cookout -was told by friend's mother -you need to slow down as would eat (2nd, 3rd and 4th and 5th portions).  Told parent would then restrict.  Friend's parent said patient likely has ED.       Health and weight impacted decision to address binge eating. Patient liked to go to buffets so could binge but Covid shutdowns no longer allowed binge eating so it became more expensive to binge      Last binge 2 days ago. Patient states over cooks so can over eat.  Patient eats until uncomfortably full.      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)     Sleeps 6 AM-12-1 PM during the summer   School sleeps 7:45-1:50 as drops son off at school      70-75% of day thinking about food   Thinks about body once eats a lot  Does not weigh   No purging, no laxatives   Diet pills -hydroxy cut -years ago   Stimulants pre-workout for the past 2 years thermogenics -150 mg caffeine      Sitting at table with family to eat first meal  Sitting at work desk for lunch   Eats rapidly 15 minutes   Wasn't allowed snack foods as  kid   Only ate at designated eating times -didn't get to choose foods and sat at table until completed meal   Eats until 80% full or full with first meal   Stuffed at 12 PM meal      Protein bar-the sweet      Fasting every other day -walking dog 1-2 miles (every other day) family get together will each past 2 years      9/11/2023 Patient with increased stress with trying to reduce exercise.  Patient reduced 2 hours of intense exercise by switching to walking her puppy.  Patient has not been negotiating calories.  Patient binges every other day.  Patient states binge can happen without intent.  Patient usually binges on meat.  Last binge with making lasagna.  Patient limits chips and cookies in her home.  Patient's biggest concern is whether her insurance will cover appointments.  Patient will be meeting with therapist at the end of the month.        10/23/2023 Patient frustrated that she does not have a path moving forward with her binge eating.  Patient had first appointment with her therapist.  Patient continues to struggle with binge eating.  Patient binge eats daily. Patient tried setting timer for binge but found that just raced to have binge.  Patient feels that exercise has been less helpful for weight management so switched to eating less calorie dense foods.  During the summer patient was eating watermelon.  Patient now eating 1 head lettuce, 2 tomatoes, 1 cucumber and avocado for binge during her lunch at work.  Patient eats evening meal with her son and will be planning what she will binge on during work.  Patient with strong eating disorder thoughts. Patient frustrated that could have just taken Wegovy to reduce appetite and lose weight.        12/13/2023 Feeling more tired with Vyvanse; every other day binge eating -yesterday-cowboy cavier (2-3 cans beans, 4 tomatoes, 3-4 avocado, 2 bags frozen corn, 1 bag frozen peas)/vegetable salsa + corn tortilla; pizza + 4 orange; more consistent timing with  "eating lunchtime; wakes up 1:50 PM coffee. Patient would like to change relationship with exercise to focus on strength, etc vs exercise that will burn the most calories.   4:00-5:00 PM breakfast   12:30 AM lunch   5:00-6:00 AM 5-6 Eggs -green bell pepper      2-3 hours exercise   1.5 hours exercise   Walked dog after brought (1 hour)      1/8/2024 Patient used cost analysis for binge and realized wasn't spending too much on food so did not cause patient to limit purchases.  Patient feels binge eating is habit and not drive from emotional standpoint or diet perspective.  Patient bought binge eating workbook and trying to start reading it.  Patient states she has difficulty focusing when trying to read.  Patient being assessed for ADHD.  Patient feels grew up trying different diets with her mom since age (as seen on TV diets).  Patient continues with binge every other day.  Patient bargains with self daily with food and exercise.  Patient wants to add exercise to increase calorie burn with more muscle mass.      2/9/2024 Patient was able to put large salad bowls away.  Patient has eaten leftovers from large portions to replace large salad- hess lasagna; enchilada.  Patient eats meals in her office or kitchen. Patient has been able to distract from binge due to helping brother pack.  Patient's binge between 1 AM 3 AM \"lunch\" at work.  Patient to get labs drawn.      Patient has increased thoughts about exercise-more obsessive -weighing 1 time per week or every other day -how to not increase exercise while negating calories -outside with dogs 1 hour; walking pace outside     3/1/2024 Patient recognizes that she is not in control of eating.  Patient tried using ice as distraction and recognized needs higher level of care. Patient thinking of calling [facility] for intake.      Diet Recall:  Breakfast: none    Lunch: 12:00-12:30 anything in the fridge -burrito (rice, black beans, shredded chicken) wrapped in burrito " "tortilla with guacamole; states size of loaf bread    Dinner: pasta-karlie and lasgana; rice and chicken; salad (spinach, green pepper, tomato, boiled eggs) 1 head iceberg + olives; eggs favorite (5:00-6:00 PM)   Snack: popcorn; pear or hard boiled egg  Beverages: coffee 6-8 cups per day (just likes does not use for energy)   Dining out: varies           Exercise: Every other day-exercises 2 times per day; while working will do lunges and push ups   2-3 hours in AM or 90 minutes in the afternoon + adding movement in tasks (patient has been using exercise as way to burn calories for the past 2 years)      NUTRITION FOCUSED PHYSICAL ASSESSMENT (NFPA) FOR DIAGNOSING MALNUTRITION  Yes         Observed:   No nutrition-related physical findings observed     Obtained from Chart/Interdisciplinary Team:  None noted      LABS  Labs reviewed     MEDICATIONS  Medications reviewed     ANTHROPOMETRICS   Height: 5'7\"  Weight: 213 lbs   BMI (kg/m2): 33.4 kg/m2   Weight Status:  Obesity Grade I BMI 30-34.9  %IBW: 151%  Weight History:   Wt Readings from Last 10 Encounters:   02/08/24 96.6 kg (213 lb)   11/07/23 97.1 kg (214 lb)   09/14/23 92.8 kg (204 lb 8 oz)   06/29/23 93 kg (205 lb)   11/03/22 88.1 kg (194 lb 4.8 oz)   11/11/21 78.7 kg (173 lb 8 oz)   02/24/21 76.2 kg (168 lb)   01/27/21 76.2 kg (168 lb)   12/09/20 76.3 kg (168 lb 4 oz)   08/26/19 71.9 kg (158 lb 8 oz)           ASSESSED NUTRITION NEEDS  Estimated Energy Needs: 1914-3499 kcals/day (14-17 Kcal/Kg)  Justification:  (obese)  Estimated Protein Needs: 61-73 grams protein/day (1-1.2 g pro/Kg)  Justification:  (preservation of lean body mass)  Estimated Fluid Needs: 5374-8088 mL/day (30-35 mL/kg)     ASSESSED MALNUTRITION STATUS  % Weight Loss:  None noted  % Intake:  No decreased intake noted  Subcutaneous Fat Loss:  None observed  Loss of Muscle Mass:  None observed  Fluid Retention:  None noted     Malnutrition Diagnosis:  Patient does not meet two of the above " criteria necessary for diagnosing malnutrition     EVALUATION/PROGRESS TOWARDS GOALS  Previous nutrition goals:  Use post -it note as ice distraction reminder -met  Adjust recipe night before for school -N/A     Previous nutrition diagnosis: Disordered eating pattern related to preoccupation with food as evidenced by current binge behaviors, restriction and excessive exercise -no change      Current nutrition diagnosis: Disordered eating pattern related to preoccupation with food as evidenced by current binge behaviors, restriction and excessive exercise        INTERVENTIONS   Nutrition Prescription   Recommend normalized eating pattern        IMPLEMENTATION   Assessed learning needs and learning preference  Teaching Method(s) used: Explanation  Vitamin and Mineral Supplements: recommend complete multivitamin and mineral   Diet Education:  Provided education on normalized eating pattern   Nutrition Education (Content):              a)  Discussed progress towards goals.  Reviewed normalized eating pattern.  Discussed options of ways to reduce binges.  Patient offered suggestions from CBT and DBT.  Encouraged patient to reduce volume of food during binge.  Discussed current compensatory behaviors and determined strategies for behavior change.  Reviewed exercise regimen being purge behavior.  Discussed higher level of care which patient is open to making appointment recognizing needs more intense support of eating disorder.  Supported patient with her struggle with eating disorder.   Nutrition Education (Application):              a)  Discussed purchasing individual portions for meals to reduce volume of food.               b)  Patient verbalizes understanding of diet by stating will try ice to distract from binge.  Expected patient engagement: good   Other: Patient to continue working with therapist.      GOALS  Contact eating disorder program for intake  Patient to call RD once completes intake form      FOLLOW  UP/MONITORING   Progress towards goals will be monitored and evaluated per protocol and Practice Guidelines  Patient call RD with update   RD name and number provided     Time Spent with Patient  21 minutes      Mike Huddleston, RD, LD  [facility] Outpatient Dietitian  [phone] (office phone)

## 2024-03-22 ENCOUNTER — VIRTUAL VISIT (OUTPATIENT)
Dept: PSYCHOLOGY | Facility: CLINIC | Age: 34
End: 2024-03-22
Payer: COMMERCIAL

## 2024-03-22 DIAGNOSIS — F90.2 ADHD (ATTENTION DEFICIT HYPERACTIVITY DISORDER), COMBINED TYPE: Primary | ICD-10-CM

## 2024-03-22 PROCEDURE — 90834 PSYTX W PT 45 MINUTES: CPT | Mod: 95 | Performed by: PSYCHOLOGIST

## 2024-03-22 NOTE — PROGRESS NOTES
[facility]                                     Progress Note    Disclaimer: Voice recognition software was used to generate this note. As a result, wrong word or 'sound-a-like' substitutions may have occurred due to the inherent limitations of voice recognition software. There may be errors in the script that have gone undetected. Please consider this when interpreting information found in this chart.     Patient Name: Vin Unger  Date: March 22, 2024             Service Type: Individual      Session Start Time: 3:00 PM    Session End Time: 3:45 PM       Session Length: 45    Session #: 7    Attendees: Client attended alone    Service Modality:  Video Visit:      Provider verified identity through the following two step process.  Patient provided:  Patient is known previously to provider    Telemedicine Visit: The patient's condition can be safely assessed and treated via synchronous audio and visual telemedicine encounter.      Reason for Telemedicine Visit: Services only offered telehealth    Originating Site (Patient Location): Patient's home    Distant Site (Provider Location): Provider Remote Setting- Home Office    Consent:  The patient/guardian has verbally consented to: the potential risks and benefits of telemedicine (video visit) versus in person care; bill my insurance or make self-payment for services provided; and responsibility for payment of non-covered services.     Patient would like the video invitation sent by:  My Chart    Mode of Communication:  [facility]    Distant Location (Provider):  Off-site    As the provider I attest to compliance with applicable laws and regulations related to telemedicine.    Provider location: Off-Site    DATA  Interactive Complexity: No  Crisis: No        Progress Since Last Session (Related to Symptoms / Goals / Homework):   Symptoms: No change stable    Homework: Achieved / completed to satisfaction      Episode of  "Care Goals: Satisfactory progress - ACTION (Actively working towards change); Intervened by reinforcing change plan / affirming steps taken     Current / Ongoing Stressors and Concerns:  Feeling overwhelmed in a lot of ways.  Has been referred to [facility]. A lot to take in. Wants to start April 2. Thirty days residency program recommended. Will be doing outpatient daily for 8-12 weeks.     Kept failing food challenges presented by nutritionist. Realized she needed daily accountability.    Getting ready to move into a first floor apartment in her building to better care for her dogs. Sue has people coming in for showings on 24 hour notice.           ADHD Symptom History  Client's highest education level was some college. Computer forensics, . During the elementary, middle, and high school years, patient recalls academic strengths in the area of math and \"hands on\" activities. Client reported experiencing academic problems in reading, social studies, and re-reading things . Client did not identify any learning problems. Client did not receive tutoring services during the school years. Client did not receive special education services. Client reported significant behavior and discipline problems including: disruptive classroom behavior and loud and interrupted a lot. Doodles. Client did attend post secondary school.   Client reported difficulty with childhood peer relationships, moved a lot. Step-dad was in . As a child, client reported that he failed to complete assigned chores in the home environment, had problems getting ready for school in the morning, had problems with organization and keeping track of items, misplaced or lost things, needed frequent reminders by parents to be motivated or to complete work, displayed argumentative or oppositional behaviors, had problems managing temper with frequent emotional outbursts, had difficulty managing personal hygiene, and taking things " apart .   Client reported that he is currently employed. Client reported that the current job is a good fit for her skills and personality.  Client reported that she often felt bored, disorganized behavior, and distractible behavior .  If it's not hands on she struggles. The client's work history includes: , , , dietician assistant.  The longest period of employment has been current job 7 years. .  Client has been terminated from a place of employment. 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    Health Habits: As a child, client reported having sleep disturbance, including: frequent dreams / nightmares, sleep walking, and talking in her sleep excessively .  Client reported currently experiencing sleep disturbance, including: daytime drowsiness / fatigue, frequent dreams / nightmares, and insomnia.  Client reported sleeping approximately 3-8 hours per night. Works nights, highly variable scleep schedule.  Client reported that he has not completed a sleep study.  Client reported having an inconsistent diet.  There are significant nutritional concerns.  Client reported  excessive exercise as purging .      Motor Vehicle Operation:  Client has received a 's license.  Client has received moving violations, including: multiple not at fault accidents speeding tickets and    Client reported the following driving habits: experiencces road rage and often exceeds the speed limit / speeds.  According to client, other people are comfortable riding as a passenger when he is driving.       Treatment Objective(s) Addressed in This Session:   Assessment  for ADHD in existing patient       Intervention:   Assessment    Assessments completed prior to visit:  The following assessments were completed by patient for this visit:  PHQ9:       5/24/2013     8:01 AM 7/11/2018     7:28 AM 10/13/2023     1:16 AM 11/4/2023     1:53 PM   PHQ-9 SCORE   PHQ-9 Total Score 14       PHQ-9 Total Score MyChart  5 (Mild depression) 5 (Mild depression) 6 (Mild depression)   PHQ-9 Total Score  5 5 6     GAD7:       5/24/2013     9:27 AM 7/11/2018     7:30 AM 11/4/2023     1:54 PM   VNEITA-7 SCORE   Total Score 13     Total Score  7 (mild anxiety) 11 (moderate anxiety)   Total Score  7 11     CAGE-AID:       9/22/2023     5:26 PM   CAGE-AID Total Score   Total Score 0   Total Score MyChart 0 (A total score of 2 or greater is considered clinically significant)     PROMIS 10-Global Health (all questions and answers displayed):       9/22/2023     5:25 PM 10/13/2023     1:20 AM 2/2/2024     1:41 AM   PROMIS 10   In general, would you say your health is: Fair Fair Good   In general, would you say your quality of life is: Very good Very good Good   In general, how would you rate your physical health? Poor Good Fair   In general, how would you rate your mental health, including your mood and your ability to think? Very good Very good Very good   In general, how would you rate your satisfaction with your social activities and relationships? Fair Good Fair   In general, please rate how well you carry out your usual social activities and roles Excellent Good Fair   To what extent are you able to carry out your everyday physical activities such as walking, climbing stairs, carrying groceries, or moving a chair? Completely Completely Completely   In the past 7 days, how often have you been bothered by emotional problems such as feeling anxious, depressed, or irritable? Sometimes Never Sometimes   In the past 7 days, how would you rate your fatigue on average? Moderate Mild Moderate   In the past 7 days, how would you rate your pain on average, where 0 means no pain, and 10 means worst imaginable pain? 0 0 0   In general, would you say your health is: 2 2 3   In general, would you say your quality of life is: 4 4 3   In general, how would you rate your physical health? 1 3 2   In general, how would you rate your  mental health, including your mood and your ability to think? 4 4 4   In general, how would you rate your satisfaction with your social activities and relationships? 2 3 2   In general, please rate how well you carry out your usual social activities and roles. (This includes activities at home, at work and in your community, and responsibilities as a parent, child, spouse, employee, friend, etc.) 5 3 2   To what extent are you able to carry out your everyday physical activities such as walking, climbing stairs, carrying groceries, or moving a chair? 5 5 5   In the past 7 days, how often have you been bothered by emotional problems such as feeling anxious, depressed, or irritable? 3 1 3   In the past 7 days, how would you rate your fatigue on average? 3 2 3   In the past 7 days, how would you rate your pain on average, where 0 means no pain, and 10 means worst imaginable pain? 0 0 0   Global Mental Health Score 13 16 12   Global Physical Health Score 14 17 15   PROMIS TOTAL - SUBSCORES 27 33 27     [place] Suicide Severity Rating Scale (Lifetime/Recent)      11/8/2023     5:00 PM   [place] Suicide Severity Rating (Lifetime/Recent)   Q1 Wished to be Dead (Past Month) no   Q2 Suicidal Thoughts (Past Month) no   Q6 Suicide Behavior (Lifetime) no         ASSESSMENT: Current Emotional / Mental Status (status of significant symptoms):   Risk status (Self / Other harm or suicidal ideation)   Patient denies current fears or concerns for personal safety.   Patient denies current or recent suicidal ideation or behaviors.   Patient denies current or recent homicidal ideation or behaviors.   Patient denies current or recent self injurious behavior or ideation.   Patient denies other safety concerns.   Patient reports there has been no change in risk factors since their last session.     Patient reports there has been no change in protective factors since their last session.     Recommended that patient call 911 or go to the  local ED should there be a change in any of these risk factors.     Appearance:   Appropriate    Eye Contact:   Good    Psychomotor Behavior: Normal    Attitude:   Cooperative    Orientation:   All   Speech    Rate / Production: Normal     Volume:  Normal    Mood:    Normal   Affect:    Appropriate    Thought Content:  Clear    Thought Form:  Coherent  Logical    Insight:    Good      Medication Review:   No changes to current psychiatric medication(s)     Medication Compliance:   NA     Changes in Health Issues:   None reported     Chemical Use Review:   Substance Use: Chemical use reviewed, no active concerns identified      Tobacco Use: No current tobacco use.      Diagnosis:  1. ADHD (attention deficit hyperactivity disorder), combined type        Collateral Reports Completed:   Not Applicable    PLAN: (Patient Tasks / Therapist Tasks / Other)  Assigned [facility] assessment, client to complete before out next session        Jesús Gonzales, PhD                                                         ______________________________________________________________________    Individual Treatment Plan    Patient's Name: Vin Unger  YOB: 1990    Date of Creation: 1/3/24  Date Treatment Plan Last Reviewed/Revised: 1/3/24    DSM5 Diagnoses: Attention-Deficit/Hyperactivity Disorder  314.01 (F90.2) Combined presentation or 307.51 (F50.8) Binge-Eating Disorder In partial remission  Severity: Moderate  Psychosocial / Contextual Factors: associated depression and anxiety  PROMIS (reviewed every 90 days):     Referral / Collaboration:  Referral to another professional/service is not indicated at this time..    Anticipated number of session for this episode of care: 15  Anticipation frequency of session: Monthly  Anticipated Duration of each session: 38-52 minutes  Treatment plan will be reviewed in 90 days or when goals have been changed.       ADHD Treatment plan:  Education- the Biopsychosocial model of  ADHD  Client will be able to describe in general terms what ADHD is and is not  Client will be able to describe how ADHD has affected their life in at least two different areas, such as school or work and Home/relationships  Clients parents/guardians or significant others will be provided information on what ADHD is and the ways it can affect relationships (see ADHD reading list) and be encouraged to be a part of clients treatment team.  Medication  If appropriate, Client will participate in medication evaluation for ADHD symptoms and follow medication recommendations.   Behavior change  Using materials form ADHD reading list or others recommended by the therapist, patient will identify and implement at least three behavior changes, (e.g. filing, use of a day planner, putting keys in the same place every day) which will improve organization.  Comorbid conditions   Client and therapist will asses for comorbid conditions (e.g. anxiety, depression, substance use). and add additional items to treatment plan as needed  Self-care  Client will identify 3 things they can do just for themselves  Client will take time for quiet, reflection, meditation 3 times per week  Client will Learn to set boundaries when appropriate  Client will Identify 2 individuals they can call on for support, distraction  Behavioral Activation  Client will Identify two forms of exercise/activity and engage in them 3 times per week  Client will Identify 2 things that make them laugh, and engage in these 3 times per week.  Client will Identify 2 Creative activities or hobbies  and engage in them 2 times per week  Client will identify music, movies, books that make them feel good and use them 3 times per week  Assessment of progress  Client will engage in assessment of their progress on a regular basis     Patient has reviewed and agreed to the above plan.      Jesús Gonzales, PhD  January 3, 2024

## 2024-03-28 DIAGNOSIS — E78.5 HYPERLIPIDEMIA LDL GOAL <130: Primary | ICD-10-CM

## 2024-03-28 RX ORDER — PRAVASTATIN SODIUM 20 MG
20 TABLET ORAL DAILY
Qty: 90 TABLET | Refills: 1 | Status: SHIPPED | OUTPATIENT
Start: 2024-03-28

## 2024-04-12 ENCOUNTER — VIRTUAL VISIT (OUTPATIENT)
Dept: PSYCHOLOGY | Facility: CLINIC | Age: 34
End: 2024-04-12
Payer: COMMERCIAL

## 2024-04-12 DIAGNOSIS — F90.2 ADHD (ATTENTION DEFICIT HYPERACTIVITY DISORDER), COMBINED TYPE: Primary | ICD-10-CM

## 2024-04-12 DIAGNOSIS — F50.819 BINGE EATING DISORDER: ICD-10-CM

## 2024-04-12 PROCEDURE — 90834 PSYTX W PT 45 MINUTES: CPT | Mod: 95 | Performed by: PSYCHOLOGIST

## 2024-04-12 NOTE — PROGRESS NOTES
[facility]                                     Progress Note    Disclaimer: Voice recognition software was used to generate this note. As a result, wrong word or 'sound-a-like' substitutions may have occurred due to the inherent limitations of voice recognition software. There may be errors in the script that have gone undetected. Please consider this when interpreting information found in this chart.     Patient Name: Vin Unger  Date: April 12, 2024             Service Type: Individual      Session Start Time: 4:00 PM    Session End Time: 4:45 PM       Session Length: 45    Session #: 8    Attendees: Client attended alone    Service Modality:  Video Visit:      Provider verified identity through the following two step process.  Patient provided:  Patient is known previously to provider    Telemedicine Visit: The patient's condition can be safely assessed and treated via synchronous audio and visual telemedicine encounter.      Reason for Telemedicine Visit: Services only offered telehealth    Originating Site (Patient Location): Patient's home    Distant Site (Provider Location): Provider Remote Setting- Home Office    Consent:  The patient/guardian has verbally consented to: the potential risks and benefits of telemedicine (video visit) versus in person care; bill my insurance or make self-payment for services provided; and responsibility for payment of non-covered services.     Patient would like the video invitation sent by:  My Chart    Mode of Communication:  Video Conference via [facility]    Distant Location (Provider):  Off-site    As the provider I attest to compliance with applicable laws and regulations related to telemedicine.    Provider location: Off-Site    DATA  Interactive Complexity: No  Crisis: No        Progress Since Last Session (Related to Symptoms / Goals / Homework):   Symptoms: No change stable    Homework: Achieved / completed to satisfaction      Episode of  Care Goals: Satisfactory progress - ACTION (Actively working towards change); Intervened by reinforcing change plan / affirming steps taken     Current / Ongoing Stressors and Concerns:  Feeling overwhelmed in a lot of ways.  Has been referred to [facility]. A lot to take in. Wants to start April 2. Thirty days residency program recommended. Will be doing outpatient daily for 8-12 weeks.     Kept failing food challenges presented by nutritionist. Realized she needed daily accountability.    Getting ready to move into a first floor apartment in her building to better care for her dogs. Sue has people coming in for showings on 24 hour notice.     Started [facility] last week. Still acclimating. Daily 8-2. Psychiatry wants to look at notes.  [facility] assessed her as malnourished. Taking additional phosphorus and multivitamin. Also watching thyroid.     Move is happening end of May. Feeling a little less stressed.     Client has requested ADHD scoring measures be included in this case note they are as follows          Assessment tools:      Maria Eugenia Adult ADHD Rating Scale-IV: Self and Other Reports (BAARS-IV), Maria Eugenia Functional Impairment Scale: Self and Other Reports (BFIS), Maria Eugenia Deficits in Executive Functioning Scale: Self and Other Reports (BDEFS), Patient Health Questionnaire-9 (PHQ-9), Generalized Anxiety Disorder-7 (VENITA-7), and CNS Vital Signs Neurocognitive Battery      Assessment Results:  Maria Eugenia Adult ADHD Rating Scale-IV: Self and Other Reports (BAARS-IV)  The BAARS-IV assesses for symptoms of ADHD that are experienced in one's daily life. This assessment measure includes self and collateral rating scales designed to provide information regarding current and childhood symptoms of ADHD including inattention, hyperactivity, and impulsivity. Self-report scores are reported as percentiles. Scores at the 76th-83rd percentile are considered marginal, scores at the 84th-92nd percentile are  "considered borderline, scores at the 93rd-95th percentile are considered mild, scores at the 96th-98th percentile are considered moderate, and those at the 99th percentile are considered severe. Collateral or \"other\" rating scales are reported as number of symptoms observed in comparison to those reported by the client. Norms and percentile scores are not available for collateral reports.     Current Symptoms Scale--Self Report:   Client completed the self-report inventory of current symptoms.     BAARS-IV SR Raw Score %tile     %tile   Inattention Total  23 96  Symptom Count 4 95   Hyperactivity Total 17 99  Symptom Count 4    Impulsivity Total 12 98  Symptom Count 2 98   Sluggish Cog Tempo 27   Symptom Count 6 96   Total ADHD Score 52 98  Total ADHD SX Count 10 97         Client indicated that the reported symptoms have resulted in impaired functioning in home and social relationships. Overall, the results suggest the client is experiencing Moderate ADHD symptoms.     Current Symptoms Scale--Other Report:  Client's Significant other completed the collateral report inventory of current symptoms.     BAARS-IV OR Raw Score     Inattention Total  17 Symptom Count 2   Hyperactivity Total 9 Symptom Count 2   Impulsivity Total 6 Symptom Count 0   Sluggish Cog Tempo 16 Symptom Count 1   Total ADHD Score 32 Total ADHD SX Count 4       The collateral contact indicated the client demonstrates impaired functioning in social relationships} The collateral- and self-report scores are significantly different. The collateral contact's scores were generally lower than the client's report.    Childhood Symptoms Scale--Self-Report:  Client completed the self-report inventory of childhood symptoms.     BAARS-IV Child SR Raw Score %tile     %tile   Inattention Total  36 99  Symptom Count 9 99   Impulsivity Total 36 99  Symptom Count 9 99   Total Score 72 99        Symptom Count 18 99            Client indicated that the reported " "symptoms resulted in impaired functioning in school, home, and social relationships Overall, the results suggest the client experienced  Severe symptoms of ADHD as a child.     Childhood Symptoms Scale--Other Report:  Unavailable    Maria Eugenia Functional Impairment Scale: Self and Other Reports (BFIS)  The BFIS is used to assess an individuals' psychosocial impairment in major life/daily activities that may be due to a mental health disorder. This assessment measure includes self and collateral rating scales. Self-report scores are reported as percentiles. Scores at the 76th-83rd percentile are considered marginal, scores at the 84th-92nd percentile are considered borderline, scores at the 93rd-95th percentile are considered mild, scores at the 96th-98th percentile are considered moderate, and those at the 99th percentile are considered severe.Collateral or \"other\" rating scales are reported as number of symptoms observed in comparison to those reported by the client. Norms and percentile scores are not available for collateral reports.     Results indicate the client identified impairment (scores at or greater than 93rd percentile) in the following areas: self-care routines The client's Mean Impairment Score was 4.57 (84th percentile) indicating the client is reporting Marginal impairment in functioning across domains. Client's Spouse  completed the collateral rating scale, which indicated discrepant results. The collateral contact's scores were generally lower than the client's report.     Maria Eugenia Deficits in Executive Functioning Scale (BDEFS)  The BDEFS is a measure used for evaluating dimensions of adult executive functioning in daily life.This assessment measure includes self and collateral rating scales. Self-report scores are reported as percentiles. Scores at the 76th-83rd percentile are considered marginal, scores at the 84th-92nd percentile are considered borderline, scores at the 93rd-95th percentile are " "considered mild, scores at the 96th-98th percentile are considered moderate, and those at the 99th percentile are considered severe.Collateral or \"other\" rating scales are reported as number of symptoms observed in comparison to those reported by the client. Norms and percentile scores are not available for collateral reports.     BDEFS-LF SR Raw Score %tile   Time Management Section 1 Total  49 90   Org/problem solving Section 2 Total 59 95   Self Restraint Section 3 Total 42 94   Self Motivation Section 4 Total 21 93   Emotional regulation Section 5 Total 20 75   Total EF Summary Score 191 92   EF Symptom Count 31    ADHD-EF Index Score 29 97       These scores suggest the client has Borderline deficits in executive functioning. These deficits are likely to be due to ADHD.     Client's Significant other  completed the collateral rating scale, which indicated similar results.     Neurocognitive testing (CNSVS)  CNS Vital Signs Neurocognitive Battery  The CNS Vital Signs Neurocognitive Battery is a remotely-administered assessment comprised of seven core subtests to individually measure the patient's verbal memory, visual memory, motor speed, psychomotor speed, reaction time, focus, ability to sustain attention and ability to adapt to changing rules and tasks.      Above average domain scores indicate a standard score (SS) greater than 109 or a Percentile Rank (AK) greater than 74, indicating a high functioning test subject. Average is a SS  or AK 25-74, indicating normal function. Low Average is a SS 80-89 or AK 9-24 indicating a slight deficit or impairment. Below Average is a SS 70-79 or AK 2-8, indicating a moderate level of deficit or impairment. Very Low is a SS less than 70 or a AK less than 2, indicating a deficit and impairment.  Validity Indicator denotes a guideline for representing the possibility of an invalid test or domain score, and can be influenced by patient understanding, effort, or other " conditions.      Neurocognitive Index (NCI): Measures an average score derived from the domain scores or a general assessment of the overall neurocognitive status of the patient.   Composite Memory: Measures how well subject can recognize, remember, and retrieve words and geometric figures, and is comprised of the Visual and Verbal Memory domains.   Verbal Memory: Measures how well subject can recognize, remember, and retrieve words.   Visual Memory: Measures how well subject can recognize, remember and retrieve geometric figures.   Psychomotor Speed: Measures how well a subject perceives, attends, responds to complex visual-perceptual information and performs simple fine motor coordination, and is comprised of the Motor Speed and Processing Speed indexes.   Reaction Time: Measures how quickly the subject can react, in milliseconds, to a simple and increasingly complex direction set.   Motor Speed: Measure: Ability to perform simple movements to produce and satisfy an intention towards a manual action and goal.     The patient's results are detailed below:        The following Scales are most closely related to ADHD (results in summary section below)    Complex Attention: Measures the ability to track and respond to a variety of stimuli over lengthy periods of time and/or perform complex mental tasks requiring vigilance quickly and accurately.     Cognitive Flexibility: Measures how well subject is able to adapt to rapidly changing and increasingly complex set of directions and/or to manipulate the information. .    Processing Speed: Measures how well a subject recognizes and processes information i.e., perceiving, attending/responding to incoming information, motor speed, fine motor coordination, and visual-perceptual ability.     Executive Function: Measures how well a subject recognizes rules, categories, and manages or navigates rapid decision making.     Simple Attention: Measures the ability to track and  "respond to a single defined stimulus over lengthy periods of time while performing vigilance and response inhibition quickly and accurately to a simple task.     Continuous performance test (CPT):  The CPT measures sustained attention or vigilance and choice reaction time most normal subjects obtained near perfect scores on this test.  A long response time may suggest cognitive slowing and or impairment.  More than 2 errors, total, may be clinically significant.  More than 4 errors, total, indicate attentional dysfunction.        Four-part continuous performance test (FP CPT):  This test is a 4 part test that measures a subjects working memory and sustained attention.  Part 1 is a simple reaction time test, the subject must press the spacebar when any stimulus is presented; part two is a variant of a continuous performance test, the subject is asked to respond to 1 stimulus, but not to any others.  Discrimination is required, so the reaction times that are generated are \"choice reaction times\".  Part 3 is a \"one back \"CPT.  The subject has to respond to a figure only if the figure immediately preceding was the same.  Part 4 is a \"2 back \"CPT it is a difficult task and is used to measure working memory.  Parts to 3 and 4 of the tests are used to calculate sustained attention.        Patient Health Questionnaire- 9 (PHQ-9)   This questionnaire is designed to assess for depression in adults.  Based on the score, she is experiencing moderate symptoms of depression. Client identified the following symptoms of depression: difficulty with sleep, poor appetite or overeating, poor concentration, and restlessness or lethargy.       Generalized Anxiety Disorder Questionnaire (VENITA-7)  This questionnaire is designed to assess for anxiety in adults.  Based on the score, she is experiencing moderate symptoms of anxiety. Client identified the following symptoms of anxiety: feeling on edge/nervous/anxious, worrying about many " different things, trouble relaxing, being restless, and becoming easily annoyed or irritable    Desdemona Sleepiness Scale:  Self-report measure of how likely the patient is to doze off in eight different situations. Max score is 24. Sufficient sleep is a score of 6 or less. Scores of 7-8 are average. Sleep medicine consult is recommended for scores above 9.      Summary (based on clinical interview, review of records, test results):    Current Symptoms:  Symptom Checklists (Age and Gender Normed)  Inattention:Moderate   Hyperactivity:Severe   Impulsivity: Moderate   Childhood Inattention:Severe   Childhood hyperactivity/impulsivity:Severe  Functional impairment 1/13 Domains. Severity, Marginal  Deficits in Executive Functioning: Borderline     Neurocognitive testing (CNSVS-Computerised)  Complex attention: Average  Cognitive Flexibility: Average  Processing Speed: Average  Executive Function: Average  Simple Attention: Average  Continuous performance task:Low Avg.  Four-part continuous performance task:Low Avg.    Self-Report  Depression: moderate   Anxiety: moderate   Sleep Problems:severe     ADHD is a neurodevelopmental disorder. As such, to qualify for a diagnosis of ADHD an individual must show some symptoms of the disorder before the age of 12; these symptoms must currently be present to a degree that is inconsistent with their developmental level; the symptoms must negatively impact the individual;  they must be present for more than six months;and  they must occur in multiple areas of the individuals life (e.g. Home and school).     Client's history and self-report would indicate moderate to severe symptoms of ADHD beginning in childhood and continuing through the current day.  She has borderline deficits in executive functioning particularly in organization and problem solving and self-restraint these are statistically significant with a diagnosis of ADHD.  In terms of functional impairment she is doing  "reasonably well but this may be due to a good adaptation in her choice of careers.  Unfortunately's career choices also likely responsible for some moderate sleep problems that she is experiencing and these should also be addressed if possible.  She did relatively well on neurocognitive testing however this may reflect above average intelligence, education and facility with computers.  I believe a diagnosis of combined type ADHD is warranted.      DSM5 Diagnoses: (Sustained by DSM5 Criteria Listed Above)  Diagnoses: Attention-Deficit/Hyperactivity Disorder  314.01 (F90.2) Combined presentation;   Psychosocial & Contextual Factors: Work stress, history of eating disorder    Recommendations: Follow through with [facility] for eating disorders.  Consult with primary care, psychiatry or sleep medicine regarding sleep difficulties  Schedule an appointment with your physician to discuss a medication evaluation., Access resources through websites, books, and articles such as those provided  in the handout., Consider working with an ADHD  or individual therapist to learn skills to  assist with symptom management, as well as ways to improve relationships,  etc that may have been impacted by your symptoms. , Consider attending workshops or support groups through [employer], Get enough sleep, 6-8 hours is typical but some people require more., Consider adding supplemental vitamin D and Fish oil. For mor information see \"[place]\" by Car Thakkar., Get aerobic exercise, ideally 45 minutes or more more days than not. , Consider a lower carbohydrate diet strong in lean protein. For mor information see \"[employer] [place] [employer] [place]\" by Car Thakkar., and Schedule a follow-up appointment with me in about six weeks to review symptoms, treatment involvement, and struggles and/or successes.    Jesús Gonzales, PhD          Treatment Objective(s) Addressed in This " Session:   Assessment  for ADHD in existing patient       Intervention:   Assessment    Assessments completed prior to visit:  The following assessments were completed by patient for this visit:  PHQ9:       5/24/2013     8:01 AM 7/11/2018     7:28 AM 10/13/2023     1:16 AM 11/4/2023     1:53 PM   PHQ-9 SCORE   PHQ-9 Total Score 14      PHQ-9 Total Score MyChart  5 (Mild depression) 5 (Mild depression) 6 (Mild depression)   PHQ-9 Total Score  5 5 6     GAD7:       5/24/2013     9:27 AM 7/11/2018     7:30 AM 11/4/2023     1:54 PM   VENITA-7 SCORE   Total Score 13     Total Score  7 (mild anxiety) 11 (moderate anxiety)   Total Score  7 11     CAGE-AID:       9/22/2023     5:26 PM   CAGE-AID Total Score   Total Score 0   Total Score MyChart 0 (A total score of 2 or greater is considered clinically significant)     PROMIS 10-Global Health (all questions and answers displayed):       9/22/2023     5:25 PM 10/13/2023     1:20 AM 2/2/2024     1:41 AM   PROMIS 10   In general, would you say your health is: Fair Fair Good   In general, would you say your quality of life is: Very good Very good Good   In general, how would you rate your physical health? Poor Good Fair   In general, how would you rate your mental health, including your mood and your ability to think? Very good Very good Very good   In general, how would you rate your satisfaction with your social activities and relationships? Fair Good Fair   In general, please rate how well you carry out your usual social activities and roles Excellent Good Fair   To what extent are you able to carry out your everyday physical activities such as walking, climbing stairs, carrying groceries, or moving a chair? Completely Completely Completely   In the past 7 days, how often have you been bothered by emotional problems such as feeling anxious, depressed, or irritable? Sometimes Never Sometimes   In the past 7 days, how would you rate your fatigue on average? Moderate Mild Moderate    In the past 7 days, how would you rate your pain on average, where 0 means no pain, and 10 means worst imaginable pain? 0 0 0   In general, would you say your health is: 2 2 3   In general, would you say your quality of life is: 4 4 3   In general, how would you rate your physical health? 1 3 2   In general, how would you rate your mental health, including your mood and your ability to think? 4 4 4   In general, how would you rate your satisfaction with your social activities and relationships? 2 3 2   In general, please rate how well you carry out your usual social activities and roles. (This includes activities at home, at work and in your community, and responsibilities as a parent, child, spouse, employee, friend, etc.) 5 3 2   To what extent are you able to carry out your everyday physical activities such as walking, climbing stairs, carrying groceries, or moving a chair? 5 5 5   In the past 7 days, how often have you been bothered by emotional problems such as feeling anxious, depressed, or irritable? 3 1 3   In the past 7 days, how would you rate your fatigue on average? 3 2 3   In the past 7 days, how would you rate your pain on average, where 0 means no pain, and 10 means worst imaginable pain? 0 0 0   Global Mental Health Score 13 16 12   Global Physical Health Score 14 17 15   PROMIS TOTAL - SUBSCORES 27 33 27     Dixie Suicide Severity Rating Scale (Lifetime/Recent)      11/8/2023     5:00 PM   [place] Suicide Severity Rating (Lifetime/Recent)   Q1 Wished to be Dead (Past Month) no   Q2 Suicidal Thoughts (Past Month) no   Q6 Suicide Behavior (Lifetime) no         ASSESSMENT: Current Emotional / Mental Status (status of significant symptoms):   Risk status (Self / Other harm or suicidal ideation)   Patient denies current fears or concerns for personal safety.   Patient denies current or recent suicidal ideation or behaviors.   Patient denies current or recent homicidal ideation or  behaviors.   Patient denies current or recent self injurious behavior or ideation.   Patient denies other safety concerns.   Patient reports there has been no change in risk factors since their last session.     Patient reports there has been no change in protective factors since their last session.     Recommended that patient call 911 or go to the local ED should there be a change in any of these risk factors.     Appearance:   Appropriate    Eye Contact:   Good    Psychomotor Behavior: Fidgety.     Attitude:   Cooperative    Orientation:   All   Speech    Rate / Production: Normal     Volume:  Normal    Mood:    Normal   Affect:    Appropriate    Thought Content:  Clear    Thought Form:  Coherent  Logical    Insight:    Good      Medication Review:   No changes to current psychiatric medication(s)     Medication Compliance:   NA     Changes in Health Issues:   None reported     Chemical Use Review:   Substance Use: Chemical use reviewed, no active concerns identified      Tobacco Use: No current tobacco use.      Diagnosis:  No diagnosis found.      Collateral Reports Completed:   Not Applicable    PLAN: (Patient Tasks / Therapist Tasks / Other)  Assigned CNSVS assessment, client to complete before out next session        Jesús Gonzales, PhD                                                         ______________________________________________________________________    Individual Treatment Plan    Patient's Name: Vin Unger  YOB: 1990    Date of Creation: 1/3/24  Date Treatment Plan Last Reviewed/Revised: 1/3/24    DSM5 Diagnoses: Attention-Deficit/Hyperactivity Disorder  314.01 (F90.2) Combined presentation or 307.51 (F50.8) Binge-Eating Disorder In partial remission  Severity: Moderate  Psychosocial / Contextual Factors: associated depression and anxiety  PROMIS (reviewed every 90 days):     Referral / Collaboration:  Referral to another professional/service is not indicated at this  time..    Anticipated number of session for this episode of care: 15  Anticipation frequency of session: Monthly  Anticipated Duration of each session: 38-52 minutes  Treatment plan will be reviewed in 90 days or when goals have been changed.       ADHD Treatment plan:  Education- the Biopsychosocial model of ADHD  Client will be able to describe in general terms what ADHD is and is not  Client will be able to describe how ADHD has affected their life in at least two different areas, such as school or work and Home/relationships  Clients parents/guardians or significant others will be provided information on what ADHD is and the ways it can affect relationships (see ADHD reading list) and be encouraged to be a part of clients treatment team.  Medication  If appropriate, Client will participate in medication evaluation for ADHD symptoms and follow medication recommendations.   Behavior change  Using materials form ADHD reading list or others recommended by the therapist, patient will identify and implement at least three behavior changes, (e.g. filing, use of a day planner, putting keys in the same place every day) which will improve organization.  Comorbid conditions   Client and therapist will asses for comorbid conditions (e.g. anxiety, depression, substance use). and add additional items to treatment plan as needed  Self-care  Client will identify 3 things they can do just for themselves  Client will take time for quiet, reflection, meditation 3 times per week  Client will Learn to set boundaries when appropriate  Client will Identify 2 individuals they can call on for support, distraction  Behavioral Activation  Client will Identify two forms of exercise/activity and engage in them 3 times per week  Client will Identify 2 things that make them laugh, and engage in these 3 times per week.  Client will Identify 2 Creative activities or hobbies  and engage in them 2 times per week  Client will identify music,  movies, books that make them feel good and use them 3 times per week  Assessment of progress  Client will engage in assessment of their progress on a regular basis     Patient has reviewed and agreed to the above plan.      Jesús Gonzales, PhD  January 3, 2024

## 2024-05-22 ENCOUNTER — VIRTUAL VISIT (OUTPATIENT)
Dept: PSYCHOLOGY | Facility: CLINIC | Age: 34
End: 2024-05-22
Payer: COMMERCIAL

## 2024-05-22 DIAGNOSIS — F50.819 BINGE EATING DISORDER: ICD-10-CM

## 2024-05-22 DIAGNOSIS — F90.2 ADHD (ATTENTION DEFICIT HYPERACTIVITY DISORDER), COMBINED TYPE: Primary | ICD-10-CM

## 2024-05-22 PROCEDURE — 90834 PSYTX W PT 45 MINUTES: CPT | Mod: 95 | Performed by: PSYCHOLOGIST

## 2024-05-22 NOTE — PROGRESS NOTES
[facility]                                     Progress Note    Disclaimer: Voice recognition software was used to generate this note. As a result, wrong word or 'sound-a-like' substitutions may have occurred due to the inherent limitations of voice recognition software. There may be errors in the script that have gone undetected. Please consider this when interpreting information found in this chart.     Patient Name: Vin Unger  Date: May 22, 2024               Service Type: Individual      Session Start Time: 5:00 PM    Session End Time: 5:45 PM       Session Length: 45    Session #: 8    Attendees: Client attended alone    Service Modality:  Video Visit:      Provider verified identity through the following two step process.  Patient provided:  Patient is known previously to provider    Telemedicine Visit: The patient's condition can be safely assessed and treated via synchronous audio and visual telemedicine encounter.      Reason for Telemedicine Visit: Services only offered telehealth    Originating Site (Patient Location): Patient's home    Distant Site (Provider Location): Provider Remote Setting- Home Office    Consent:  The patient/guardian has verbally consented to: the potential risks and benefits of telemedicine (video visit) versus in person care; bill my insurance or make self-payment for services provided; and responsibility for payment of non-covered services.     Patient would like the video invitation sent by:  My Chart    Mode of [facility]:  [facility]    Distant Location (Provider):  Off-site    As the provider I attest to compliance with applicable laws and regulations related to telemedicine.    Provider location: Off-Site    DATA  Interactive Complexity: No  Crisis: No        Progress Since Last Session (Related to Symptoms / Goals / Homework):   Symptoms: No change stable    Homework: Achieved / completed to satisfaction      Episode of  Care Goals: Satisfactory progress - ACTION (Actively working towards change); Intervened by reinforcing change plan / affirming steps taken     Current / Ongoing Stressors and Concerns:  Will be done with [facility] next week. Will be doing virtual visits from 4-7PM as aftercare for the next 4-6 week. Has realized a number of things. If she is hungry she does not have to eat. Psychiatry tried concerta which induced somnolence. Will be doing gene site testing tomorrow. Tried propranalol for anxiety. Worked for about a week then pooped out.     Finding she had been a little more irritable. ED therapist is working on theory she was using binging and purging to numb negative emotions.   Significant other has been incredibly supportive. Helping her stick with meal plan.        Treatment Objective(s) Addressed in This Session:   Gratitude practice.        Intervention:   Assessment    Assessments completed prior to visit:  The following assessments were completed by patient for this visit:  PHQ9:       5/24/2013     8:01 AM 7/11/2018     7:28 AM 10/13/2023     1:16 AM 11/4/2023     1:53 PM   PHQ-9 SCORE   PHQ-9 Total Score 14      PHQ-9 Total Score MyChart  5 (Mild depression) 5 (Mild depression) 6 (Mild depression)   PHQ-9 Total Score  5 5 6     GAD7:       5/24/2013     9:27 AM 7/11/2018     7:30 AM 11/4/2023     1:54 PM   VENITA-7 SCORE   Total Score 13     Total Score  7 (mild anxiety) 11 (moderate anxiety)   Total Score  7 11     CAGE-AID:       9/22/2023     5:26 PM   CAGE-AID Total Score   Total Score 0   Total Score MyChart 0 (A total score of 2 or greater is considered clinically significant)     PROMIS 10-Global Health (all questions and answers displayed):       9/22/2023     5:25 PM 10/13/2023     1:20 AM 2/2/2024     1:41 AM 5/19/2024     4:25 PM   PROMIS 10   In general, would you say your health is: Fair Fair Good Very good   In general, would you say your quality of life is: Very good Very good Good Good    In general, how would you rate your physical health? Poor Good Fair Good   In general, how would you rate your mental health, including your mood and your ability to think? Very good Very good Very good Very good   In general, how would you rate your satisfaction with your social activities and relationships? Fair Good Fair Good   In general, please rate how well you carry out your usual social activities and roles Excellent Good Fair Very good   To what extent are you able to carry out your everyday physical activities such as walking, climbing stairs, carrying groceries, or moving a chair? Completely Completely Completely Completely   In the past 7 days, how often have you been bothered by emotional problems such as feeling anxious, depressed, or irritable? Sometimes Never Sometimes Often   In the past 7 days, how would you rate your fatigue on average? Moderate Mild Moderate Moderate   In the past 7 days, how would you rate your pain on average, where 0 means no pain, and 10 means worst imaginable pain? 0 0 0 0   In general, would you say your health is: 2 2 3 4   In general, would you say your quality of life is: 4 4 3 3   In general, how would you rate your physical health? 1 3 2 3   In general, how would you rate your mental health, including your mood and your ability to think? 4 4 4 4   In general, how would you rate your satisfaction with your social activities and relationships? 2 3 2 3   In general, please rate how well you carry out your usual social activities and roles. (This includes activities at home, at work and in your community, and responsibilities as a parent, child, spouse, employee, friend, etc.) 5 3 2 4   To what extent are you able to carry out your everyday physical activities such as walking, climbing stairs, carrying groceries, or moving a chair? 5 5 5 5   In the past 7 days, how often have you been bothered by emotional problems such as feeling anxious, depressed, or irritable? 3 1 3  4   In the past 7 days, how would you rate your fatigue on average? 3 2 3 3   In the past 7 days, how would you rate your pain on average, where 0 means no pain, and 10 means worst imaginable pain? 0 0 0 0   Global Mental Health Score 13 16 12 12   Global Physical Health Score 14 17 15 16   PROMIS TOTAL - SUBSCORES 27 33 27 28     [place] Suicide Severity Rating Scale (Lifetime/Recent)      11/8/2023     5:00 PM   [place] Suicide Severity Rating (Lifetime/Recent)   Q1 Wished to be Dead (Past Month) no   Q2 Suicidal Thoughts (Past Month) no   Q6 Suicide Behavior (Lifetime) no         ASSESSMENT: Current Emotional / Mental Status (status of significant symptoms):   Risk status (Self / Other harm or suicidal ideation)   Patient denies current fears or concerns for personal safety.   Patient denies current or recent suicidal ideation or behaviors.   Patient denies current or recent homicidal ideation or behaviors.   Patient denies current or recent self injurious behavior or ideation.   Patient denies other safety concerns.   Patient reports there has been no change in risk factors since their last session.     Patient reports there has been no change in protective factors since their last session.     Recommended that patient call 911 or go to the local ED should there be a change in any of these risk factors.     Appearance:   Appropriate    Eye Contact:   Good    Psychomotor Behavior: Normal    Attitude:   Cooperative    Orientation:   All   Speech    Rate / Production: Normal     Volume:  Normal    Mood:    Normal   Affect:    Appropriate    Thought Content:  Clear    Thought Form:  Coherent  Logical    Insight:    Good      Medication Review:   No changes to current psychiatric medication(s)     Medication Compliance:   NA     Changes in Health Issues:   None reported     Chemical Use Review:   Substance Use: Chemical use reviewed, no active concerns identified      Tobacco Use: No current tobacco use.       Diagnosis:  1. ADHD (attention deficit hyperactivity disorder), combined type    2. Binge eating disorder        Collateral Reports Completed:   Not Applicable    PLAN: (Patient Tasks / Therapist Tasks / Other)  Assigned [facility] assessment, client to complete before out next session        Jesús Gonzales, PhD                                                         ______________________________________________________________________    Individual Treatment Plan    Patient's Name: Vin Unger  YOB: 1990    Date of Creation: 1/3/24  Date Treatment Plan Last Reviewed/Revised: 1/3/24, May 22, 2024      DSM5 Diagnoses: Attention-Deficit/Hyperactivity Disorder  314.01 (F90.2) Combined presentation or 307.51 (F50.8) Binge-Eating Disorder In partial remission  Severity: Moderate  Psychosocial / Contextual Factors: associated depression and anxiety  PROMIS (reviewed every 90 days):     Referral / Collaboration:  Referral to another professional/service is not indicated at this time..    Anticipated number of session for this episode of care: 15  Anticipation frequency of session: Monthly  Anticipated Duration of each session: 38-52 minutes  Treatment plan will be reviewed in 90 days or when goals have been changed.       ADHD Treatment plan:  Education- the Biopsychosocial model of ADHD  Client will be able to describe in general terms what ADHD is and is not  Client will be able to describe how ADHD has affected their life in at least two different areas, such as school or work and Home/relationships  Clients parents/guardians or significant others will be provided information on what ADHD is and the ways it can affect relationships (see ADHD reading list) and be encouraged to be a part of clients treatment team.  Medication  If appropriate, Client will participate in medication evaluation for ADHD symptoms and follow medication recommendations.   Behavior change  Using materials form ADHD reading  list or others recommended by the therapist, patient will identify and implement at least three behavior changes, (e.g. filing, use of a day planner, putting keys in the same place every day) which will improve organization.  Comorbid conditions   Client and therapist will asses for comorbid conditions (e.g. anxiety, depression, substance use). and add additional items to treatment plan as needed  Self-care  Client will identify 3 things they can do just for themselves  Client will take time for quiet, reflection, meditation 3 times per week  Client will Learn to set boundaries when appropriate  Client will Identify 2 individuals they can call on for support, distraction  Behavioral Activation  Client will Identify two forms of exercise/activity and engage in them 3 times per week  Client will Identify 2 things that make them laugh, and engage in these 3 times per week.  Client will Identify 2 Creative activities or hobbies  and engage in them 2 times per week  Client will identify music, movies, books that make them feel good and use them 3 times per week  Assessment of progress  Client will engage in assessment of their progress on a regular basis     Patient has reviewed and agreed to the above plan.      Jesús Gonzales, PhD  January 3, 2024

## 2024-06-14 ENCOUNTER — VIRTUAL VISIT (OUTPATIENT)
Dept: PSYCHOLOGY | Facility: CLINIC | Age: 34
End: 2024-06-14
Payer: COMMERCIAL

## 2024-06-14 DIAGNOSIS — F90.2 ADHD (ATTENTION DEFICIT HYPERACTIVITY DISORDER), COMBINED TYPE: Primary | ICD-10-CM

## 2024-06-14 DIAGNOSIS — F50.819 BINGE EATING DISORDER: ICD-10-CM

## 2024-06-14 DIAGNOSIS — F41.9 ANXIETY DISORDER, UNSPECIFIED TYPE: ICD-10-CM

## 2024-06-14 PROCEDURE — 90834 PSYTX W PT 45 MINUTES: CPT | Mod: 95 | Performed by: PSYCHOLOGIST

## 2024-06-14 NOTE — PROGRESS NOTES
[facility]                                     Progress Note    Disclaimer: Voice recognition software was used to generate this note. As a result, wrong word or 'sound-a-like' substitutions may have occurred due to the inherent limitations of voice recognition software. There may be errors in the script that have gone undetected. Please consider this when interpreting information found in this chart.     Patient Name: Vin Unger  Date: June 14, 2024         Service Type: Individual      Session Start Time: 10:00 AM      Session End Time: 10:45 AM         Session Length: 45    Session #: 9    Attendees: Client attended alone    Service Modality:  Video Visit:      Provider verified identity through the following two step process.  Patient provided:  Patient is known previously to provider    Telemedicine Visit: The patient's condition can be safely assessed and treated via synchronous audio and visual telemedicine encounter.      Reason for Telemedicine Visit: Services only offered telehealth    Originating Site (Patient Location): Patient's home    Distant Site (Provider Location): Provider Remote Setting- Home Office    Consent:  The patient/guardian has verbally consented to: the potential risks and benefits of telemedicine (video visit) versus in person care; bill my insurance or make self-payment for services provided; and responsibility for payment of non-covered services.     Patient would like the video invitation sent by:  My Chart    Mode of Communication:  [facility] Conference [facility]    Distant Location (Provider):  Off-site    As the provider I attest to compliance with applicable laws and regulations related to telemedicine.    Provider location: Off-Site    DATA  Interactive Complexity: No  Crisis: No        Progress Since Last Session (Related to Symptoms / Goals / Homework):   Symptoms: No change stable    Homework: Achieved / completed to satisfaction      Episode of  Care Goals: Satisfactory progress - ACTION (Actively working towards change); Intervened by reinforcing change plan / affirming steps taken     Current / Ongoing Stressors and Concerns:  Finding she had been a little more irritable. ED therapist is working on theory she was using binging and purging to numb negative emotions.   Significant other has been incredibly supportive. Helping her stick with meal plan.     Doing aftercare at [employer]. Virtual treatment makes for long day. May wind up going to work for an hour, then treatment then back to work. Back to work last 2 weeks.     Psychiatrist will continue working with her during IOP. Did do genetic testing. Showed stimulants not really advisable. Trying her on Adderall XR, started on 10 now up to 20.        Treatment Objective(s) Addressed in This Session:   Gratitude practice.        Intervention:   Assessment    Assessments completed prior to visit:  The following assessments were completed by patient for this visit:  PHQ9:       5/24/2013     8:01 AM 7/11/2018     7:28 AM 10/13/2023     1:16 AM 11/4/2023     1:53 PM   PHQ-9 SCORE   PHQ-9 Total Score 14      PHQ-9 Total Score MyChart  5 (Mild depression) 5 (Mild depression) 6 (Mild depression)   PHQ-9 Total Score  5 5 6     GAD7:       5/24/2013     9:27 AM 7/11/2018     7:30 AM 11/4/2023     1:54 PM   VENITA-7 SCORE   Total Score 13     Total Score  7 (mild anxiety) 11 (moderate anxiety)   Total Score  7 11     CAGE-AID:       9/22/2023     5:26 PM   CAGE-AID Total Score   Total Score 0   Total Score MyChart 0 (A total score of 2 or greater is considered clinically significant)     PROMIS 10-Global Health (all questions and answers displayed):       9/22/2023     5:25 PM 10/13/2023     1:20 AM 2/2/2024     1:41 AM 5/19/2024     4:25 PM   PROMIS 10   In general, would you say your health is: Fair Fair Good Very good   In general, would you say your quality of life is: Very good Very good Good Good   In  general, how would you rate your physical health? Poor Good Fair Good   In general, how would you rate your mental health, including your mood and your ability to think? Very good Very good Very good Very good   In general, how would you rate your satisfaction with your social activities and relationships? Fair Good Fair Good   In general, please rate how well you carry out your usual social activities and roles Excellent Good Fair Very good   To what extent are you able to carry out your everyday physical activities such as walking, climbing stairs, carrying groceries, or moving a chair? Completely Completely Completely Completely   In the past 7 days, how often have you been bothered by emotional problems such as feeling anxious, depressed, or irritable? Sometimes Never Sometimes Often   In the past 7 days, how would you rate your fatigue on average? Moderate Mild Moderate Moderate   In the past 7 days, how would you rate your pain on average, where 0 means no pain, and 10 means worst imaginable pain? 0 0 0 0   In general, would you say your health is: 2 2 3 4   In general, would you say your quality of life is: 4 4 3 3   In general, how would you rate your physical health? 1 3 2 3   In general, how would you rate your mental health, including your mood and your ability to think? 4 4 4 4   In general, how would you rate your satisfaction with your social activities and relationships? 2 3 2 3   In general, please rate how well you carry out your usual social activities and roles. (This includes activities at home, at work and in your community, and responsibilities as a parent, child, spouse, employee, friend, etc.) 5 3 2 4   To what extent are you able to carry out your everyday physical activities such as walking, climbing stairs, carrying groceries, or moving a chair? 5 5 5 5   In the past 7 days, how often have you been bothered by emotional problems such as feeling anxious, depressed, or irritable? 3 1 3 4    In the past 7 days, how would you rate your fatigue on average? 3 2 3 3   In the past 7 days, how would you rate your pain on average, where 0 means no pain, and 10 means worst imaginable pain? 0 0 0 0   Global Mental Health Score 13 16 12 12   Global Physical Health Score 14 17 15 16   PROMIS TOTAL - SUBSCORES 27 33 27 28     [place] Suicide Severity Rating Scale (Lifetime/Recent)      11/8/2023     5:00 PM   [place] Suicide Severity Rating (Lifetime/Recent)   Q1 Wished to be Dead (Past Month) no   Q2 Suicidal Thoughts (Past Month) no   Q6 Suicide Behavior (Lifetime) no         ASSESSMENT: Current Emotional / Mental Status (status of significant symptoms):   Risk status (Self / Other harm or suicidal ideation)   Patient denies current fears or concerns for personal safety.   Patient denies current or recent suicidal ideation or behaviors.   Patient denies current or recent homicidal ideation or behaviors.   Patient denies current or recent self injurious behavior or ideation.   Patient denies other safety concerns.   Patient reports there has been no change in risk factors since their last session.     Patient reports there has been no change in protective factors since their last session.     Recommended that patient call 911 or go to the local ED should there be a change in any of these risk factors.     Appearance:   Appropriate    Eye Contact:   Good    Psychomotor Behavior: Normal    Attitude:   Cooperative    Orientation:   All   Speech    Rate / Production: Normal     Volume:  Normal    Mood:    Normal   Affect:    Appropriate    Thought Content:  Clear    Thought Form:  Coherent  Logical    Insight:    Good      Medication Review:   No changes to current psychiatric medication(s)     Medication Compliance:   NA     Changes in Health Issues:   None reported     Chemical Use Review:   Substance Use: Chemical use reviewed, no active concerns identified      Tobacco Use: No current tobacco use.       Diagnosis:  1. ADHD (attention deficit hyperactivity disorder), combined type    2. Binge eating disorder    3. Anxiety disorder, unspecified type        Collateral Reports Completed:   Not Applicable    PLAN: (Patient Tasks / Therapist Tasks / Other)  Assigned [facility] assessment, client to complete before out next session        Jesús Gonzales, PhD                                                         ______________________________________________________________________    Individual Treatment Plan    Patient's Name: Vin Unger  YOB: 1990    Date of Creation: 1/3/24  Date Treatment Plan Last Reviewed/Revised: 1/3/24, May 22, 2024      DSM5 Diagnoses: Attention-Deficit/Hyperactivity Disorder  314.01 (F90.2) Combined presentation or 307.51 (F50.8) Binge-Eating Disorder In partial remission  Severity: Moderate  Psychosocial / Contextual Factors: associated depression and anxiety  PROMIS (reviewed every 90 days):     Referral / Collaboration:  Referral to another professional/service is not indicated at this time..    Anticipated number of session for this episode of care: 15  Anticipation frequency of session: Monthly  Anticipated Duration of each session: 38-52 minutes  Treatment plan will be reviewed in 90 days or when goals have been changed.       ADHD Treatment plan:  Education- the Biopsychosocial model of ADHD  Client will be able to describe in general terms what ADHD is and is not  Client will be able to describe how ADHD has affected their life in at least two different areas, such as school or work and Home/relationships  Clients parents/guardians or significant others will be provided information on what ADHD is and the ways it can affect relationships (see ADHD reading list) and be encouraged to be a part of clients treatment team.  Medication  If appropriate, Client will participate in medication evaluation for ADHD symptoms and follow medication recommendations.   Behavior  change  Using materials form ADHD reading list or others recommended by the therapist, patient will identify and implement at least three behavior changes, (e.g. filing, use of a day planner, putting keys in the same place every day) which will improve organization.  Comorbid conditions   Client and therapist will asses for comorbid conditions (e.g. anxiety, depression, substance use). and add additional items to treatment plan as needed  Self-care  Client will identify 3 things they can do just for themselves  Client will take time for quiet, reflection, meditation 3 times per week  Client will Learn to set boundaries when appropriate  Client will Identify 2 individuals they can call on for support, distraction  Behavioral Activation  Client will Identify two forms of exercise/activity and engage in them 3 times per week  Client will Identify 2 things that make them laugh, and engage in these 3 times per week.  Client will Identify 2 Creative activities or hobbies  and engage in them 2 times per week  Client will identify music, movies, books that make them feel good and use them 3 times per week  Assessment of progress  Client will engage in assessment of their progress on a regular basis     Patient has reviewed and agreed to the above plan.      Jesús Gonzales, PhD  January 3, 2024

## 2024-06-25 ENCOUNTER — MYC MEDICAL ADVICE (OUTPATIENT)
Dept: FAMILY MEDICINE | Facility: OTHER | Age: 34
End: 2024-06-25
Payer: COMMERCIAL

## 2024-07-10 ASSESSMENT — ANXIETY QUESTIONNAIRES
4. TROUBLE RELAXING: NEARLY EVERY DAY
GAD7 TOTAL SCORE: 13
5. BEING SO RESTLESS THAT IT IS HARD TO SIT STILL: NEARLY EVERY DAY
3. WORRYING TOO MUCH ABOUT DIFFERENT THINGS: SEVERAL DAYS
7. FEELING AFRAID AS IF SOMETHING AWFUL MIGHT HAPPEN: NOT AT ALL
7. FEELING AFRAID AS IF SOMETHING AWFUL MIGHT HAPPEN: NOT AT ALL
GAD7 TOTAL SCORE: 13
8. IF YOU CHECKED OFF ANY PROBLEMS, HOW DIFFICULT HAVE THESE MADE IT FOR YOU TO DO YOUR WORK, TAKE CARE OF THINGS AT HOME, OR GET ALONG WITH OTHER PEOPLE?: SOMEWHAT DIFFICULT
1. FEELING NERVOUS, ANXIOUS, OR ON EDGE: NEARLY EVERY DAY
GAD7 TOTAL SCORE: 13
2. NOT BEING ABLE TO STOP OR CONTROL WORRYING: SEVERAL DAYS
6. BECOMING EASILY ANNOYED OR IRRITABLE: MORE THAN HALF THE DAYS
IF YOU CHECKED OFF ANY PROBLEMS ON THIS QUESTIONNAIRE, HOW DIFFICULT HAVE THESE PROBLEMS MADE IT FOR YOU TO DO YOUR WORK, TAKE CARE OF THINGS AT HOME, OR GET ALONG WITH OTHER PEOPLE: SOMEWHAT DIFFICULT

## 2024-07-12 ENCOUNTER — VIRTUAL VISIT (OUTPATIENT)
Dept: PSYCHOLOGY | Facility: CLINIC | Age: 34
End: 2024-07-12
Payer: COMMERCIAL

## 2024-07-12 DIAGNOSIS — F50.819 BINGE EATING DISORDER: ICD-10-CM

## 2024-07-12 DIAGNOSIS — F90.2 ADHD (ATTENTION DEFICIT HYPERACTIVITY DISORDER), COMBINED TYPE: Primary | ICD-10-CM

## 2024-07-12 PROCEDURE — 90834 PSYTX W PT 45 MINUTES: CPT | Mod: 95 | Performed by: PSYCHOLOGIST

## 2024-07-12 NOTE — PROGRESS NOTES
[facility]                                     Progress Note    Disclaimer: Voice recognition software was used to generate this note. As a result, wrong word or 'sound-a-like' substitutions may have occurred due to the inherent limitations of voice recognition software. There may be errors in the script that have gone undetected. Please consider this when interpreting information found in this chart.     Patient Name: Vin Unger  Date: July 12, 2024           Service Type: Individual      Session Start Time: 1:00 PM        Session End Time: 1:45 PM           Session Length: 45    Session #: 10    Attendees: Client attended alone    Service Modality:  Video Visit:      Provider verified identity through the following two step process.  Patient provided:  Patient is known previously to provider    Telemedicine Visit: The patient's condition can be safely assessed and treated via synchronous audio and visual telemedicine encounter.      Reason for Telemedicine Visit: Services only offered telehealth    Originating Site (Patient Location): Patient's home    Distant Site (Provider Location): Provider Remote Setting- Home Office    Consent:  The patient/guardian has verbally consented to: the potential risks and benefits of telemedicine (video visit) versus in person care; bill my insurance or make self-payment for services provided; and responsibility for payment of non-covered services.     Patient would like the video invitation sent by:  My Chart    Mode of Communication:  [facility] Conference [facility]    Distant Location (Provider):  Off-site    As the provider I attest to compliance with applicable laws and regulations related to telemedicine.    Provider location: Off-Site    DATA  Interactive Complexity: No  Crisis: No        Progress Since Last Session (Related to Symptoms / Goals / Homework):   Symptoms: No change stable    Homework: Achieved / completed to satisfaction      Episode  of Care Goals: Satisfactory progress - ACTION (Actively working towards change); Intervened by reinforcing change plan / affirming steps taken     Current / Ongoing Stressors and Concerns:    Psychiatry wanted blood pressure reading. Turned out a little high possibly dt lack of sleep. Work and [facility] program have been impinging on her sleep. 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 Doing interviews. [facility] program ends in a couple weeks. Mindfulness and DBT skills have been useful.   Reporting good response to Adderall XR 30. Feeling a little more connected to herself. More self aware.      Treatment Objective(s) Addressed in This Session:   Gratitude practice.        Intervention:   Assessment    Assessments completed prior to visit:  The following assessments were completed by patient for this visit:  PHQ9:       5/24/2013     8:01 AM 7/11/2018     7:28 AM 10/13/2023     1:16 AM 11/4/2023     1:53 PM   PHQ-9 SCORE   PHQ-9 Total Score 14      PHQ-9 Total Score MyChart  5 (Mild depression) 5 (Mild depression) 6 (Mild depression)   PHQ-9 Total Score  5 5 6     GAD7:       5/24/2013     9:27 AM 7/11/2018     7:30 AM 11/4/2023     1:54 PM 7/10/2024     7:46 PM   VENITA-7 SCORE   Total Score 13      Total Score  7 (mild anxiety) 11 (moderate anxiety) 13 (moderate anxiety)   Total Score  7 11 13     CAGE-AID:       9/22/2023     5:26 PM   CAGE-AID Total Score   Total Score 0   Total Score MyChart 0 (A total score of 2 or greater is considered clinically significant)     PROMIS 10-Global Health (all questions and answers displayed):       9/22/2023     5:25 PM 10/13/2023     1:20 AM 2/2/2024     1:41 AM 5/19/2024     4:25 PM   PROMIS 10   In general, would you say your health is: Fair Fair Good Very good   In general, would you say your quality of life is: Very good Very good Good Good   In general, how would you rate your physical health? Poor Good Fair Good   In general, how would you rate your mental health, including your mood and  your ability to think? Very good Very good Very good Very good   In general, how would you rate your satisfaction with your social activities and relationships? Fair Good Fair Good   In general, please rate how well you carry out your usual social activities and roles Excellent Good Fair Very good   To what extent are you able to carry out your everyday physical activities such as walking, climbing stairs, carrying groceries, or moving a chair? Completely Completely Completely Completely   In the past 7 days, how often have you been bothered by emotional problems such as feeling anxious, depressed, or irritable? Sometimes Never Sometimes Often   In the past 7 days, how would you rate your fatigue on average? Moderate Mild Moderate Moderate   In the past 7 days, how would you rate your pain on average, where 0 means no pain, and 10 means worst imaginable pain? 0 0 0 0   In general, would you say your health is: 2 2 3 4   In general, would you say your quality of life is: 4 4 3 3   In general, how would you rate your physical health? 1 3 2 3   In general, how would you rate your mental health, including your mood and your ability to think? 4 4 4 4   In general, how would you rate your satisfaction with your social activities and relationships? 2 3 2 3   In general, please rate how well you carry out your usual social activities and roles. (This includes activities at home, at work and in your community, and responsibilities as a parent, child, spouse, employee, friend, etc.) 5 3 2 4   To what extent are you able to carry out your everyday physical activities such as walking, climbing stairs, carrying groceries, or moving a chair? 5 5 5 5   In the past 7 days, how often have you been bothered by emotional problems such as feeling anxious, depressed, or irritable? 3 1 3 4   In the past 7 days, how would you rate your fatigue on average? 3 2 3 3   In the past 7 days, how would you rate your pain on average, where 0 means  no pain, and 10 means worst imaginable pain? 0 0 0 0   Global Mental Health Score 13 16 12 12   Global Physical Health Score 14 17 15 16   PROMIS TOTAL - SUBSCORES 27 33 27 28     [place] Suicide Severity Rating Scale (Lifetime/Recent)      11/8/2023     5:00 PM   [place] Suicide Severity Rating (Lifetime/Recent)   Q1 Wished to be Dead (Past Month) no   Q2 Suicidal Thoughts (Past Month) no   Q6 Suicide Behavior (Lifetime) no         ASSESSMENT: Current Emotional / Mental Status (status of significant symptoms):   Risk status (Self / Other harm or suicidal ideation)   Patient denies current fears or concerns for personal safety.   Patient denies current or recent suicidal ideation or behaviors.   Patient denies current or recent homicidal ideation or behaviors.   Patient denies current or recent self injurious behavior or ideation.   Patient denies other safety concerns.   Patient reports there has been no change in risk factors since their last session.     Patient reports there has been no change in protective factors since their last session.     Recommended that patient call 911 or go to the local ED should there be a change in any of these risk factors.     Appearance:   Appropriate    Eye Contact:   Good    Psychomotor Behavior: Normal    Attitude:   Cooperative    Orientation:   All   Speech    Rate / Production: Normal     Volume:  Normal    Mood:    Normal   Affect:    Appropriate    Thought Content:  Clear    Thought Form:  Coherent  Logical    Insight:    Good      Medication Review:   No changes to current psychiatric medication(s)     Medication Compliance:   NA     Changes in Health Issues:   None reported     Chemical Use Review:   Substance Use: Chemical use reviewed, no active concerns identified      Tobacco Use: No current tobacco use.      Diagnosis:  1. ADHD (attention deficit hyperactivity disorder), combined type    2. Binge eating disorder        Collateral Reports Completed:   Not  Applicable    Client to:  Continue with psychiatry. Complete [facility].         Jesús Gonzales, PhD                                                         ______________________________________________________________________    Individual Treatment Plan    Patient's Name: Vin Unger  YOB: 1990    Date of Creation: 1/3/24  Date Treatment Plan Last Reviewed/Revised: 1/3/24, May 22, 2024      DSM5 Diagnoses: Attention-Deficit/Hyperactivity Disorder  314.01 (F90.2) Combined presentation or 307.51 (F50.8) Binge-Eating Disorder In partial remission  Severity: Moderate  Psychosocial / Contextual Factors: associated depression and anxiety  PROMIS (reviewed every 90 days):     Referral / Collaboration:  Referral to another professional/service is not indicated at this time..    Anticipated number of session for this episode of care: 15  Anticipation frequency of session: Monthly  Anticipated Duration of each session: 38-52 minutes  Treatment plan will be reviewed in 90 days or when goals have been changed.       ADHD Treatment plan:  Education- the Biopsychosocial model of ADHD  Client will be able to describe in general terms what ADHD is and is not  Client will be able to describe how ADHD has affected their life in at least two different areas, such as school or work and Home/relationships  Clients parents/guardians or significant others will be provided information on what ADHD is and the ways it can affect relationships (see ADHD reading list) and be encouraged to be a part of clients treatment team.  Medication  If appropriate, Client will participate in medication evaluation for ADHD symptoms and follow medication recommendations.   Behavior change  Using materials form ADHD reading list or others recommended by the therapist, patient will identify and implement at least three behavior changes, (e.g. filing, use of a day planner, putting keys in the same place every day) which will  improve organization.  Comorbid conditions   Client and therapist will asses for comorbid conditions (e.g. anxiety, depression, substance use). and add additional items to treatment plan as needed  Self-care  Client will identify 3 things they can do just for themselves  Client will take time for quiet, reflection, meditation 3 times per week  Client will Learn to set boundaries when appropriate  Client will Identify 2 individuals they can call on for support, distraction  Behavioral Activation  Client will Identify two forms of exercise/activity and engage in them 3 times per week  Client will Identify 2 things that make them laugh, and engage in these 3 times per week.  Client will Identify 2 Creative activities or hobbies  and engage in them 2 times per week  Client will identify music, movies, books that make them feel good and use them 3 times per week  Assessment of progress  Client will engage in assessment of their progress on a regular basis     Patient has reviewed and agreed to the above plan.      Jesús Gonzales, PhD  January 3, 2024    Answers submitted by the patient for this visit:  VENITA-7 (Submitted on 7/10/2024)  VENITA 7 TOTAL SCORE: 13

## 2024-08-02 ENCOUNTER — VIRTUAL VISIT (OUTPATIENT)
Dept: PSYCHOLOGY | Facility: CLINIC | Age: 34
End: 2024-08-02
Payer: COMMERCIAL

## 2024-08-02 DIAGNOSIS — F90.2 ADHD (ATTENTION DEFICIT HYPERACTIVITY DISORDER), COMBINED TYPE: Primary | ICD-10-CM

## 2024-08-02 DIAGNOSIS — F50.819 BINGE EATING DISORDER: ICD-10-CM

## 2024-08-02 PROCEDURE — 90834 PSYTX W PT 45 MINUTES: CPT | Mod: 95 | Performed by: PSYCHOLOGIST

## 2024-08-02 NOTE — PROGRESS NOTES
[facility]                                     Progress Note    Disclaimer: Voice recognition software was used to generate this note. As a result, wrong word or 'sound-a-like' substitutions may have occurred due to the inherent limitations of voice recognition software. There may be errors in the script that have gone undetected. Please consider this when interpreting information found in this chart.     Patient Name: Vin Unger  Date: August 2, 2024             Service Type: Individual      Session Start Time: 1:00 PM        Session End Time: 1:45 PM           Session Length: 45    Session #: 11    Attendees: Client attended alone    Service Modality:  Video Visit:      Provider verified identity through the following two step process.  Patient provided:  Patient is known previously to provider    Telemedicine Visit: The patient's condition can be safely assessed and treated via synchronous audio and visual telemedicine encounter.      Reason for Telemedicine Visit: Services only offered telehealth    Originating Site (Patient Location): Patient's home    Distant Site (Provider Location): Provider Remote Setting- Home Office    Consent:  The patient/guardian has verbally consented to: the potential risks and benefits of telemedicine (video visit) versus in person care; bill my insurance or make self-payment for services provided; and responsibility for payment of non-covered services.     Patient would like the video invitation sent by:  My Chart    Mode of Communication:  Video Conference via [facility]    Distant Location (Provider):  Off-site    As the provider I attest to compliance with applicable laws and regulations related to telemedicine.    Provider location: Off-Site    DATA  Interactive Complexity: No  Crisis: No        Progress Since Last Session (Related to Symptoms / Goals / Homework):   Symptoms: No change stable    Homework: Achieved / completed to  satisfaction      Episode of Care Goals: Satisfactory progress - ACTION (Actively working towards change); Intervened by reinforcing change plan / affirming steps taken     Current / Ongoing Stressors and Concerns:    [facility] program completed Tuesday. Less having to switch back and forth.   Transferring psych meds to primary care.     Has come to accept her eating disorder diagnosis. Noticing a lot of little things in her life that are related to it.     Going hunting in September for grouse.     Notes good response to increase in ADDERALL. Notes decreased anxiety. Able to focus when she has other appointments that day.      Treatment Objective(s) Addressed in This Session:   Gratitude practice.        Intervention:   Assessment    Assessments completed prior to visit:  The following assessments were completed by patient for this visit:  PHQ9:       5/24/2013     8:01 AM 7/11/2018     7:28 AM 10/13/2023     1:16 AM 11/4/2023     1:53 PM   PHQ-9 SCORE   PHQ-9 Total Score 14      PHQ-9 Total Score MyChart  5 (Mild depression) 5 (Mild depression) 6 (Mild depression)   PHQ-9 Total Score  5 5 6     GAD7:       5/24/2013     9:27 AM 7/11/2018     7:30 AM 11/4/2023     1:54 PM 7/10/2024     7:46 PM   VENITA-7 SCORE   Total Score 13      Total Score  7 (mild anxiety) 11 (moderate anxiety) 13 (moderate anxiety)   Total Score  7 11 13     CAGE-AID:       9/22/2023     5:26 PM   CAGE-AID Total Score   Total Score 0   Total Score MyChart 0 (A total score of 2 or greater is considered clinically significant)     PROMIS 10-Global Health (all questions and answers displayed):       9/22/2023     5:25 PM 10/13/2023     1:20 AM 2/2/2024     1:41 AM 5/19/2024     4:25 PM   PROMIS 10   In general, would you say your health is: Fair Fair Good Very good   In general, would you say your quality of life is: Very good Very good Good Good   In general, how would you rate your physical health? Poor Good Fair Good   In general, how would you rate  your mental health, including your mood and your ability to think? Very good Very good Very good Very good   In general, how would you rate your satisfaction with your social activities and relationships? Fair Good Fair Good   In general, please rate how well you carry out your usual social activities and roles Excellent Good Fair Very good   To what extent are you able to carry out your everyday physical activities such as walking, climbing stairs, carrying groceries, or moving a chair? Completely Completely Completely Completely   In the past 7 days, how often have you been bothered by emotional problems such as feeling anxious, depressed, or irritable? Sometimes Never Sometimes Often   In the past 7 days, how would you rate your fatigue on average? Moderate Mild Moderate Moderate   In the past 7 days, how would you rate your pain on average, where 0 means no pain, and 10 means worst imaginable pain? 0 0 0 0   In general, would you say your health is: 2 2 3 4   In general, would you say your quality of life is: 4 4 3 3   In general, how would you rate your physical health? 1 3 2 3   In general, how would you rate your mental health, including your mood and your ability to think? 4 4 4 4   In general, how would you rate your satisfaction with your social activities and relationships? 2 3 2 3   In general, please rate how well you carry out your usual social activities and roles. (This includes activities at home, at work and in your community, and responsibilities as a parent, child, spouse, employee, friend, etc.) 5 3 2 4   To what extent are you able to carry out your everyday physical activities such as walking, climbing stairs, carrying groceries, or moving a chair? 5 5 5 5   In the past 7 days, how often have you been bothered by emotional problems such as feeling anxious, depressed, or irritable? 3 1 3 4   In the past 7 days, how would you rate your fatigue on average? 3 2 3 3   In the past 7 days, how would  you rate your pain on average, where 0 means no pain, and 10 means worst imaginable pain? 0 0 0 0   Global Mental Health Score 13 16 12 12   Global Physical Health Score 14 17 15 16   PROMIS TOTAL - SUBSCORES 27 33 27 28     [place] Suicide Severity Rating Scale (Lifetime/Recent)      11/8/2023     5:00 PM   [place] Suicide Severity Rating (Lifetime/Recent)   Q1 Wished to be Dead (Past Month) no   Q2 Suicidal Thoughts (Past Month) no   Q6 Suicide Behavior (Lifetime) no         ASSESSMENT: Current Emotional / Mental Status (status of significant symptoms):   Risk status (Self / Other harm or suicidal ideation)   Patient denies current fears or concerns for personal safety.   Patient denies current or recent suicidal ideation or behaviors.   Patient denies current or recent homicidal ideation or behaviors.   Patient denies current or recent self injurious behavior or ideation.   Patient denies other safety concerns.   Patient reports there has been no change in risk factors since their last session.     Patient reports there has been no change in protective factors since their last session.     Recommended that patient call 911 or go to the local ED should there be a change in any of these risk factors.     Appearance:   Appropriate    Eye Contact:   Good    Psychomotor Behavior: Normal    Attitude:   Cooperative    Orientation:   All   Speech    Rate / Production: Normal     Volume:  Normal    Mood:    Normal   Affect:    Appropriate    Thought Content:  Clear    Thought Form:  Coherent  Logical    Insight:    Good      Medication Review:   Changes to psychiatric medications, see updated Medication List in EPIC.   ADDERALL XR increased to 40.      Medication Compliance:   NA     Changes in Health Issues:   None reported     Chemical Use Review:   Substance Use: Chemical use reviewed, no active concerns identified      Tobacco Use: No current tobacco use.      Diagnosis:  1. ADHD (attention deficit hyperactivity  disorder), combined type    2. Binge eating disorder        Collateral Reports Completed:   Not Applicable    Client to:  Continue with psychiatry. Complete [facility].         Jesús Gonzales, PhD                                                         ______________________________________________________________________    Individual Treatment Plan    Patient's Name: Vin Unger  YOB: 1990    Date of Creation: 1/3/24  Date Treatment Plan Last Reviewed/Revised: 1/3/24, May 22, 2024      DSM5 Diagnoses: Attention-Deficit/Hyperactivity Disorder  314.01 (F90.2) Combined presentation or 307.51 (F50.8) Binge-Eating Disorder In partial remission  Severity: Moderate  Psychosocial / Contextual Factors: associated depression and anxiety  PROMIS (reviewed every 90 days):     Referral / Collaboration:  Referral to another professional/service is not indicated at this time..    Anticipated number of session for this episode of care: 15  Anticipation frequency of session: Monthly  Anticipated Duration of each session: 38-52 minutes  Treatment plan will be reviewed in 90 days or when goals have been changed.       ADHD Treatment plan:  Education- the Biopsychosocial model of ADHD  Client will be able to describe in general terms what ADHD is and is not  Client will be able to describe how ADHD has affected their life in at least two different areas, such as school or work and Home/relationships  Clients parents/guardians or significant others will be provided information on what ADHD is and the ways it can affect relationships (see ADHD reading list) and be encouraged to be a part of clients treatment team.  Medication  If appropriate, Client will participate in medication evaluation for ADHD symptoms and follow medication recommendations.   Behavior change  Using materials form ADHD reading list or others recommended by the therapist, patient will identify and implement at least three behavior  changes, (e.g. filing, use of a day planner, putting keys in the same place every day) which will improve organization.  Comorbid conditions   Client and therapist will asses for comorbid conditions (e.g. anxiety, depression, substance use). and add additional items to treatment plan as needed  Self-care  Client will identify 3 things they can do just for themselves  Client will take time for quiet, reflection, meditation 3 times per week  Client will Learn to set boundaries when appropriate  Client will Identify 2 individuals they can call on for support, distraction  Behavioral Activation  Client will Identify two forms of exercise/activity and engage in them 3 times per week  Client will Identify 2 things that make them laugh, and engage in these 3 times per week.  Client will Identify 2 Creative activities or hobbies  and engage in them 2 times per week  Client will identify music, movies, books that make them feel good and use them 3 times per week  Assessment of progress  Client will engage in assessment of their progress on a regular basis     Patient has reviewed and agreed to the above plan.      Jesús Gonzales, PhD  January 3, 2024    Answers submitted by the patient for this visit:  VENITA-7 (Submitted on 7/10/2024)  VENITA 7 TOTAL SCORE: 13

## 2024-08-15 ENCOUNTER — PATIENT OUTREACH (OUTPATIENT)
Dept: CARE COORDINATION | Facility: CLINIC | Age: 34
End: 2024-08-15
Payer: COMMERCIAL

## 2024-08-16 ENCOUNTER — MYC MEDICAL ADVICE (OUTPATIENT)
Dept: FAMILY MEDICINE | Facility: OTHER | Age: 34
End: 2024-08-16

## 2024-08-16 ENCOUNTER — OFFICE VISIT (OUTPATIENT)
Dept: FAMILY MEDICINE | Facility: OTHER | Age: 34
End: 2024-08-16
Payer: COMMERCIAL

## 2024-08-16 VITALS
DIASTOLIC BLOOD PRESSURE: 80 MMHG | BODY MASS INDEX: 29.82 KG/M2 | OXYGEN SATURATION: 97 % | WEIGHT: 190 LBS | HEART RATE: 105 BPM | HEIGHT: 67 IN | RESPIRATION RATE: 19 BRPM | TEMPERATURE: 98.8 F | SYSTOLIC BLOOD PRESSURE: 122 MMHG

## 2024-08-16 DIAGNOSIS — F90.2 ADHD (ATTENTION DEFICIT HYPERACTIVITY DISORDER), COMBINED TYPE: Primary | ICD-10-CM

## 2024-08-16 DIAGNOSIS — F50.20 BULIMIA NERVOSA: ICD-10-CM

## 2024-08-16 PROCEDURE — G2211 COMPLEX E/M VISIT ADD ON: HCPCS | Performed by: PHYSICIAN ASSISTANT

## 2024-08-16 PROCEDURE — 99213 OFFICE O/P EST LOW 20 MIN: CPT | Performed by: PHYSICIAN ASSISTANT

## 2024-08-16 RX ORDER — DEXTROAMPHETAMINE SACCHARATE, AMPHETAMINE ASPARTATE MONOHYDRATE, DEXTROAMPHETAMINE SULFATE AND AMPHETAMINE SULFATE 5; 5; 5; 5 MG/1; MG/1; MG/1; MG/1
CAPSULE, EXTENDED RELEASE ORAL
COMMUNITY
Start: 2024-06-07 | End: 2024-08-16

## 2024-08-16 RX ORDER — DEXTROAMPHETAMINE SACCHARATE, AMPHETAMINE ASPARTATE MONOHYDRATE, DEXTROAMPHETAMINE SULFATE AND AMPHETAMINE SULFATE 5; 5; 5; 5 MG/1; MG/1; MG/1; MG/1
40 CAPSULE, EXTENDED RELEASE ORAL DAILY
Qty: 60 CAPSULE | Refills: 0 | Status: SHIPPED | OUTPATIENT
Start: 2024-09-15 | End: 2024-08-26

## 2024-08-16 RX ORDER — DEXTROAMPHETAMINE SACCHARATE, AMPHETAMINE ASPARTATE MONOHYDRATE, DEXTROAMPHETAMINE SULFATE AND AMPHETAMINE SULFATE 2.5; 2.5; 2.5; 2.5 MG/1; MG/1; MG/1; MG/1
10 CAPSULE, EXTENDED RELEASE ORAL EVERY MORNING
COMMUNITY
Start: 2024-05-31 | End: 2024-08-16

## 2024-08-16 RX ORDER — DEXTROAMPHETAMINE SACCHARATE, AMPHETAMINE ASPARTATE MONOHYDRATE, DEXTROAMPHETAMINE SULFATE AND AMPHETAMINE SULFATE 5; 5; 5; 5 MG/1; MG/1; MG/1; MG/1
40 CAPSULE, EXTENDED RELEASE ORAL DAILY
Qty: 60 CAPSULE | Refills: 0 | Status: SHIPPED | OUTPATIENT
Start: 2024-08-16 | End: 2024-08-19

## 2024-08-16 RX ORDER — DEXTROAMPHETAMINE SACCHARATE, AMPHETAMINE ASPARTATE MONOHYDRATE, DEXTROAMPHETAMINE SULFATE AND AMPHETAMINE SULFATE 5; 5; 5; 5 MG/1; MG/1; MG/1; MG/1
40 CAPSULE, EXTENDED RELEASE ORAL DAILY
Qty: 60 CAPSULE | Refills: 0 | Status: SHIPPED | OUTPATIENT
Start: 2024-10-15 | End: 2024-08-26

## 2024-08-16 ASSESSMENT — PAIN SCALES - GENERAL: PAINLEVEL: NO PAIN (0)

## 2024-08-16 NOTE — PROGRESS NOTES
"  Assessment & Plan   The longitudinal plan of care for the diagnosis(es)/condition(s) as documented were addressed during this visit. Due to the added complexity in care, I will continue to support Vin in the subsequent management and with ongoing continuity of care.    ADHD (attention deficit hyperactivity disorder), combined type  Patient is doing well on 40 mg of Adderall XR daily.  Follow-up in 1 month.  - amphetamine-dextroamphetamine (ADDERALL XR) 20 MG 24 hr capsule; Take 2 capsules (40 mg) by mouth daily for 30 days  - amphetamine-dextroamphetamine (ADDERALL XR) 20 MG 24 hr capsule; Take 2 capsules (40 mg) by mouth daily for 30 days  - amphetamine-dextroamphetamine (ADDERALL XR) 20 MG 24 hr capsule; Take 2 capsules (40 mg) by mouth daily for 60 days    Bulimia nervosa (H28)  Stable at this point in time with ongoing counseling and dietary consults.  Follow-up with me as needed.    BMI  Estimated body mass index is 29.79 kg/m  as calculated from the following:    Height as of this encounter: 1.701 m (5' 6.97\").    Weight as of this encounter: 86.2 kg (190 lb).   Weight management plan: Discussed healthy diet and exercise guidelines      Regular exercise    Subjective   Vin is a 34 year old, presenting for the following health issues:  Recheck Medication      8/16/2024    12:49 PM   Additional Questions   Roomed by fransisca   Accompanied by self         8/16/2024    12:49 PM   Patient Reported Additional Medications   Patient reports taking the following new medications Adderall XR 40mg daily, Vitamin D 5000 IU     History of Present Illness       Reason for visit:  Continued Care - ED, ADHD    She eats 2-3 servings of fruits and vegetables daily.She consumes 0 sweetened beverage(s) daily.She exercises with enough effort to increase her heart rate 10 to 19 minutes per day.  She exercises with enough effort to increase her heart rate 3 or less days per week. She is missing 3 dose(s) of medications per " "week.  She is not taking prescribed medications regularly due to remembering to take.         Review of Systems  Constitutional, HEENT, cardiovascular, pulmonary, GI, , musculoskeletal, neuro, skin, endocrine and psych systems are negative, except as otherwise noted.      Objective    /80   Pulse 105   Temp 98.8  F (37.1  C) (Temporal)   Resp 19   Ht 1.701 m (5' 6.97\")   Wt 86.2 kg (190 lb)   LMP 08/02/2024 (Approximate)   SpO2 97%   BMI 29.79 kg/m    Body mass index is 29.79 kg/m .  Physical Exam   GENERAL: alert and no distress  NECK: no adenopathy, no asymmetry, masses, or scars  RESP: lungs clear to auscultation - no rales, rhonchi or wheezes  CV: regular rate and rhythm, normal S1 S2, no S3 or S4, no murmur, click or rub, no peripheral edema  ABDOMEN: soft, nontender, no hepatosplenomegaly, no masses and bowel sounds normal  MS: no gross musculoskeletal defects noted, no edema    No results found for this or any previous visit (from the past 24 hour(s)).        Signed Electronically by: Juan Riley PA-C    "

## 2024-08-19 DIAGNOSIS — F90.2 ADHD (ATTENTION DEFICIT HYPERACTIVITY DISORDER), COMBINED TYPE: ICD-10-CM

## 2024-08-19 RX ORDER — DEXTROAMPHETAMINE SACCHARATE, AMPHETAMINE ASPARTATE MONOHYDRATE, DEXTROAMPHETAMINE SULFATE AND AMPHETAMINE SULFATE 5; 5; 5; 5 MG/1; MG/1; MG/1; MG/1
40 CAPSULE, EXTENDED RELEASE ORAL DAILY
Qty: 60 CAPSULE | Refills: 0 | Status: SHIPPED | OUTPATIENT
Start: 2024-08-19

## 2024-08-26 ENCOUNTER — OFFICE VISIT (OUTPATIENT)
Dept: OBGYN | Facility: CLINIC | Age: 34
End: 2024-08-26
Payer: COMMERCIAL

## 2024-08-26 VITALS
HEIGHT: 67 IN | SYSTOLIC BLOOD PRESSURE: 121 MMHG | DIASTOLIC BLOOD PRESSURE: 80 MMHG | BODY MASS INDEX: 29.82 KG/M2 | WEIGHT: 190 LBS

## 2024-08-26 DIAGNOSIS — Z01.419 WELL FEMALE EXAM WITH ROUTINE GYNECOLOGICAL EXAM: Primary | ICD-10-CM

## 2024-08-26 DIAGNOSIS — E78.5 HYPERLIPIDEMIA LDL GOAL <130: ICD-10-CM

## 2024-08-26 DIAGNOSIS — Z97.5 IUD (INTRAUTERINE DEVICE) IN PLACE: ICD-10-CM

## 2024-08-26 LAB
ALBUMIN SERPL BCG-MCNC: 4.5 G/DL (ref 3.5–5.2)
ALP SERPL-CCNC: 65 U/L (ref 40–150)
ALT SERPL W P-5'-P-CCNC: <5 U/L (ref 0–50)
ANION GAP SERPL CALCULATED.3IONS-SCNC: 10 MMOL/L (ref 7–15)
AST SERPL W P-5'-P-CCNC: 15 U/L (ref 0–45)
BILIRUB SERPL-MCNC: 0.7 MG/DL
BUN SERPL-MCNC: 10.1 MG/DL (ref 6–20)
CALCIUM SERPL-MCNC: 9.4 MG/DL (ref 8.8–10.4)
CHLORIDE SERPL-SCNC: 104 MMOL/L (ref 98–107)
CHOLEST SERPL-MCNC: 219 MG/DL
CREAT SERPL-MCNC: 0.85 MG/DL (ref 0.51–0.95)
EGFRCR SERPLBLD CKD-EPI 2021: >90 ML/MIN/1.73M2
FASTING STATUS PATIENT QL REPORTED: YES
FASTING STATUS PATIENT QL REPORTED: YES
GLUCOSE SERPL-MCNC: 100 MG/DL (ref 70–99)
HCO3 SERPL-SCNC: 24 MMOL/L (ref 22–29)
HDLC SERPL-MCNC: 52 MG/DL
LDLC SERPL CALC-MCNC: 151 MG/DL
NONHDLC SERPL-MCNC: 167 MG/DL
POTASSIUM SERPL-SCNC: 3.7 MMOL/L (ref 3.4–5.3)
PROT SERPL-MCNC: 7.3 G/DL (ref 6.4–8.3)
SODIUM SERPL-SCNC: 138 MMOL/L (ref 135–145)
TRIGL SERPL-MCNC: 79 MG/DL

## 2024-08-26 PROCEDURE — 80061 LIPID PANEL: CPT | Performed by: OBSTETRICS & GYNECOLOGY

## 2024-08-26 PROCEDURE — 36415 COLL VENOUS BLD VENIPUNCTURE: CPT | Performed by: OBSTETRICS & GYNECOLOGY

## 2024-08-26 PROCEDURE — 99395 PREV VISIT EST AGE 18-39: CPT | Performed by: OBSTETRICS & GYNECOLOGY

## 2024-08-26 PROCEDURE — 80053 COMPREHEN METABOLIC PANEL: CPT | Performed by: OBSTETRICS & GYNECOLOGY

## 2024-08-26 NOTE — PROGRESS NOTES
34 year old  presents for annual health maintenance exam today.    No history of GDM or GHTN   Contraception - tubal .   History of endometriosis - managed with kyleena.  She will rarely have a period, light.  Rarely has endometriosis symptoms.    Additional concerns:  none.  She has been working with Juan regarding her cholesterol and will have labs done today.  She recently completed a eating disorder program.       History of abnormal Pap smear: No - age 28- 62 PAP with HPV every 5 years recommended      Latest Ref Rng & Units 2023     1:08 PM 2018     9:32 AM 2018     9:30 AM   PAP / HPV   PAP  Negative for Intraepithelial Lesion or Malignancy (NILM)      PAP (Historical)   NIL     HPV 16 DNA Negative Negative   Negative    HPV 18 DNA Negative Negative   Negative    Other HR HPV Negative Negative   Negative        No Known Allergies    Current Outpatient Medications   Medication Sig Dispense Refill    amphetamine-dextroamphetamine (ADDERALL XR) 20 MG 24 hr capsule Take 2 capsules (40 mg) by mouth daily 60 capsule 0    levonorgestrel (KYLEENA) 19.5 MG IUD 1 each by Intrauterine route once      pravastatin (PRAVACHOL) 20 MG tablet Take 1 tablet (20 mg) by mouth daily 90 tablet 1    tretinoin (RETIN-A) 0.025 % external gel Apply topically At Bedtime 45 g 3     No current facility-administered medications for this visit.       Patient Active Problem List   Diagnosis    Chronic pelvic pain in female    Ovarian cysts    Endometriosis of pelvis    Family history of familial hypercholesterolemia    IUD (intrauterine device) in place    Intractable episodic headache, unspecified headache type    Diarrhea, unspecified type    Abdominal bloating    Binge eating disorder    VENITA (generalized anxiety disorder)    Acne, unspecified acne type    Hand pain, right    PCOS (polycystic ovarian syndrome)     Past Surgical History:   Procedure Laterality Date    APPENDECTOMY      DAVINCI ASSISTED ABLATION  / EXCISION OF ENDOMETRIOSIS  12/06/2013    Dr. Tolliver of [facility]    ESOPHAGOSCOPY, GASTROSCOPY, DUODENOSCOPY (EGD), COMBINED  04/16/2013    Procedure: COMBINED ESOPHAGOSCOPY, GASTROSCOPY, DUODENOSCOPY (EGD);  EGD  Nausea   pkt given in clinic  [facility];  Surgeon: Oliver Del Cid MD;  Location: [facility] OR    GYN SURGERY  2011, 2012    cystectomy    TUBAL LIGATION  05/2021       Social History     Tobacco Use    Smoking status: Former     Types: Cigarettes    Smokeless tobacco: Never   Substance Use Topics    Alcohol use: Yes     Family History   Problem Relation Age of Onset    Hyperlipidemia Mother     Endometriosis Mother     Endometriosis Maternal Grandmother     Hyperlipidemia Maternal Grandfather     Diabetes Type 2  Paternal Grandmother     Cancer Paternal Grandmother     Prostate Cancer Paternal Grandfather             PHQ-2 Score:         5/22/2024     2:58 PM 2/8/2024     7:04 AM   PHQ-2 ( 1999 [employer])   Q1: Little interest or pleasure in doing things 0 0   Q2: Feeling down, depressed or hopeless 0 0   PHQ-2 Score 0 0   Q1: Little interest or pleasure in doing things Not at all Not at all   Q2: Feeling down, depressed or hopeless Not at all Not at all   PHQ-2 Score 0 0        BP Readings from Last 3 Encounters:   08/26/24 121/80   08/16/24 122/80   02/08/24 118/68    Wt Readings from Last 3 Encounters:   08/26/24 86.2 kg (190 lb)   08/16/24 86.2 kg (190 lb)   02/08/24 96.6 kg (213 lb)         Recent Labs   Lab Test 02/12/24  0812 09/14/23  1330 11/03/22  1209 11/11/21  0937 11/11/21  0937 01/28/21  0855 08/29/19  0738 07/11/18  0803   * 148*  --   --  122* 121*   < > 153*   HDL 60 56  --   --  63 66   < > 74   TRIG 124 110  --   --  116 106   < > 84   ALT 19  --   --   --   --   --   --   --    CR 0.89 0.87 0.74   < > 0.78 0.80  --  0.84   GFRESTIMATED 86 89 >90   < > >90 >90  --  80   GFRESTBLACK  --   --   --   --   --  >90  --  >90   POTASSIUM 3.7 4.2 4.0   < > 3.4 4.0  --   "4.0   TSH  --   --  0.90  --   --   --   --   --     < > = values in this interval not displayed.        ROS:  Pertinent positives and negatives are listed in the HPI.     Physical Exam  Vitals:    08/26/24 1256   BP: 121/80   Weight: 86.2 kg (190 lb)   Height: 1.701 m (5' 6.97\")     Body mass index is 29.78 kg/m .  Gen: Alert and oriented times 3, no acute distress.  Well developed, well nourished, pleasant.    Neck: Supple, no masses.  No thyromegaly.  Breast: Symmetrical without lesions.  No dimpling, nipple discharge, or discrete masses.  No lymphadenopathy.  Chest:  Non labored.  Clear to auscultation bilaterally.    Heart: Regular, normal S1, S2.  No murmurs.   Abdomen: Soft, nontender, nondistended.  No hepatosplenomegaly.    :  Normal female external genitalia.  No lesions.  Urethral meatus normal.  Speculum exam reveals a normal vaginal vault, normal cervix with normal IUD strings.  No abnormal discharge.  Bimanual exam reveals a normal, mobile, nontender uterus.  No cervical motion tenderness.  Adnexa nontender with no palpable masses.    Extremities:  Nontender, no edema.        Assessment/Plan:     ICD-10-CM    1. Well female exam with routine gynecological exam  Z01.419       2. IUD (intrauterine device) in place  Z97.5            Continue current healthy lifestyle patterns    Return in one year or as needed.        {Breast Cancer Risk Calculator  BRCA-Related Cancer Risk Assessment FHS-7 Tool :060680    "

## 2024-08-26 NOTE — PATIENT INSTRUCTIONS
If you have labs or imaging done, the results will automatically release in [employer] without an interpretation.  Your health care professional will review those results and send an interpretation with recommendations as soon as possible, but this may be 1-3 business days.    If you have any questions regarding your visit, please contact your care team.     [employer]: [phone]  [facility] CLINIC HOURS TELEPHONE NUMBER       MD Cathy Bourne - Certified Medical Assistant     Thuy- LEAD RN  Sofía-CRAIG Haskins-CRAIG Robert-  Vanita-     Monday- [place]  8:00 a.m - 5:00 p.m    Tuesday- Surgery        Thursday- [place]  8:00 a.m - 5:00 p.m.    Friday- [place]  7:30 a.m - 4:00 p.m. [facility]  [address]  [place], [place] [address] [phone] Fax  [phone] Phone  Imaging Scheduling [phone]    [facility]  [address] [place] [address] [address].  [place], [place] [address] [phone]    [facility]  [address] [place] [address] [phone] Phone  [phone] Fax  Imaging Scheduling [phone]     Urgent Care locations:  [facility] Monday-Friday  10 am - 8 pm  Saturday and Sunday   9 am - 5 pm  Monday-Friday   10 am - 8 pm  Saturday and Sunday   9 am - 5 pm   [phone]     **Surgeries** Our Surgery Schedulers will contact you to schedule. If you do not receive a call within 3 business days, please call [phone].    If you need a medication refill, please contact your pharmacy. Please allow 3 business days for your refill to be completed.    As always, thank you for trusting us with your healthcare needs!

## 2024-09-17 ENCOUNTER — OFFICE VISIT (OUTPATIENT)
Dept: FAMILY MEDICINE | Facility: OTHER | Age: 34
End: 2024-09-17
Payer: COMMERCIAL

## 2024-09-17 VITALS
BODY MASS INDEX: 27.89 KG/M2 | TEMPERATURE: 97.6 F | RESPIRATION RATE: 18 BRPM | OXYGEN SATURATION: 98 % | SYSTOLIC BLOOD PRESSURE: 102 MMHG | HEIGHT: 68 IN | DIASTOLIC BLOOD PRESSURE: 72 MMHG | HEART RATE: 111 BPM | WEIGHT: 184 LBS

## 2024-09-17 DIAGNOSIS — F90.2 ADHD (ATTENTION DEFICIT HYPERACTIVITY DISORDER), COMBINED TYPE: ICD-10-CM

## 2024-09-17 PROCEDURE — 99213 OFFICE O/P EST LOW 20 MIN: CPT | Performed by: PHYSICIAN ASSISTANT

## 2024-09-17 ASSESSMENT — PAIN SCALES - GENERAL: PAINLEVEL: NO PAIN (0)

## 2024-09-17 NOTE — PROGRESS NOTES
"  Assessment & Plan     ADHD (attention deficit hyperactivity disorder), combined type  Stable at this point in time with no new concerns.  She states as long as she remembers to take it it works well.  Follow-up in 3 months.    Rainer Banuelos is a 34 year old, presenting for the following health issues:  A.D.H.D    History of Present Illness       Reason for visit:  ADHD - Med Review    She eats 2-3 servings of fruits and vegetables daily.She consumes 1 sweetened beverage(s) daily.She exercises with enough effort to increase her heart rate 10 to 19 minutes per day.  She exercises with enough effort to increase her heart rate 3 or less days per week.   She is taking medications regularly.       Review of Systems  Constitutional, HEENT, cardiovascular, pulmonary, GI, , musculoskeletal, neuro, skin, endocrine and psych systems are negative, except as otherwise noted.      Objective    /72   Pulse 111   Temp 97.6  F (36.4  C) (Temporal)   Resp 18   Ht 1.725 m (5' 7.91\")   Wt 83.5 kg (184 lb)   LMP 08/02/2024 (Approximate)   SpO2 98%   BMI 28.05 kg/m    Body mass index is 28.05 kg/m .  Physical Exam   GENERAL: alert and no distress  NECK: no adenopathy, no asymmetry, masses, or scars  RESP: lungs clear to auscultation - no rales, rhonchi or wheezes  CV: regular rate and rhythm, normal S1 S2, no S3 or S4, no murmur, click or rub, no peripheral edema  ABDOMEN: soft, nontender, no hepatosplenomegaly, no masses and bowel sounds normal  MS: no gross musculoskeletal defects noted, no edema    No results found for this or any previous visit (from the past 24 hour(s)).        Signed Electronically by: Juan Riley PA-C    "

## 2024-09-24 RX ORDER — DEXTROAMPHETAMINE SACCHARATE, AMPHETAMINE ASPARTATE MONOHYDRATE, DEXTROAMPHETAMINE SULFATE AND AMPHETAMINE SULFATE 5; 5; 5; 5 MG/1; MG/1; MG/1; MG/1
40 CAPSULE, EXTENDED RELEASE ORAL DAILY
Qty: 60 CAPSULE | Refills: 0 | Status: SHIPPED | OUTPATIENT
Start: 2024-09-24

## 2024-12-11 ASSESSMENT — ANXIETY QUESTIONNAIRES
1. FEELING NERVOUS, ANXIOUS, OR ON EDGE: SEVERAL DAYS
6. BECOMING EASILY ANNOYED OR IRRITABLE: SEVERAL DAYS
IF YOU CHECKED OFF ANY PROBLEMS ON THIS QUESTIONNAIRE, HOW DIFFICULT HAVE THESE PROBLEMS MADE IT FOR YOU TO DO YOUR WORK, TAKE CARE OF THINGS AT HOME, OR GET ALONG WITH OTHER PEOPLE: NOT DIFFICULT AT ALL
7. FEELING AFRAID AS IF SOMETHING AWFUL MIGHT HAPPEN: NOT AT ALL
5. BEING SO RESTLESS THAT IT IS HARD TO SIT STILL: SEVERAL DAYS
4. TROUBLE RELAXING: SEVERAL DAYS
GAD7 TOTAL SCORE: 6
GAD7 TOTAL SCORE: 6
3. WORRYING TOO MUCH ABOUT DIFFERENT THINGS: SEVERAL DAYS
2. NOT BEING ABLE TO STOP OR CONTROL WORRYING: SEVERAL DAYS
8. IF YOU CHECKED OFF ANY PROBLEMS, HOW DIFFICULT HAVE THESE MADE IT FOR YOU TO DO YOUR WORK, TAKE CARE OF THINGS AT HOME, OR GET ALONG WITH OTHER PEOPLE?: NOT DIFFICULT AT ALL
7. FEELING AFRAID AS IF SOMETHING AWFUL MIGHT HAPPEN: NOT AT ALL
GAD7 TOTAL SCORE: 6

## 2024-12-11 ASSESSMENT — PATIENT HEALTH QUESTIONNAIRE - PHQ9
10. IF YOU CHECKED OFF ANY PROBLEMS, HOW DIFFICULT HAVE THESE PROBLEMS MADE IT FOR YOU TO DO YOUR WORK, TAKE CARE OF THINGS AT HOME, OR GET ALONG WITH OTHER PEOPLE: SOMEWHAT DIFFICULT
SUM OF ALL RESPONSES TO PHQ QUESTIONS 1-9: 4
SUM OF ALL RESPONSES TO PHQ QUESTIONS 1-9: 4

## 2024-12-16 ENCOUNTER — OFFICE VISIT (OUTPATIENT)
Dept: FAMILY MEDICINE | Facility: OTHER | Age: 34
End: 2024-12-16
Payer: COMMERCIAL

## 2024-12-16 VITALS
TEMPERATURE: 96.9 F | WEIGHT: 187 LBS | BODY MASS INDEX: 28.34 KG/M2 | HEART RATE: 99 BPM | DIASTOLIC BLOOD PRESSURE: 80 MMHG | SYSTOLIC BLOOD PRESSURE: 108 MMHG | RESPIRATION RATE: 18 BRPM | HEIGHT: 68 IN | OXYGEN SATURATION: 98 %

## 2024-12-16 DIAGNOSIS — F90.2 ADHD (ATTENTION DEFICIT HYPERACTIVITY DISORDER), COMBINED TYPE: ICD-10-CM

## 2024-12-16 DIAGNOSIS — F50.814 BINGE EATING DISORDER IN REMISSION: Primary | ICD-10-CM

## 2024-12-16 DIAGNOSIS — F41.1 GAD (GENERALIZED ANXIETY DISORDER): ICD-10-CM

## 2024-12-16 PROBLEM — R19.7 DIARRHEA, UNSPECIFIED TYPE: Status: RESOLVED | Noted: 2021-01-27 | Resolved: 2024-12-16

## 2024-12-16 PROBLEM — R14.0 ABDOMINAL BLOATING: Status: RESOLVED | Noted: 2021-01-27 | Resolved: 2024-12-16

## 2024-12-16 PROCEDURE — G2211 COMPLEX E/M VISIT ADD ON: HCPCS | Performed by: PHYSICIAN ASSISTANT

## 2024-12-16 PROCEDURE — 99213 OFFICE O/P EST LOW 20 MIN: CPT | Performed by: PHYSICIAN ASSISTANT

## 2024-12-16 RX ORDER — DEXTROAMPHETAMINE SACCHARATE, AMPHETAMINE ASPARTATE MONOHYDRATE, DEXTROAMPHETAMINE SULFATE AND AMPHETAMINE SULFATE 5; 5; 5; 5 MG/1; MG/1; MG/1; MG/1
40 CAPSULE, EXTENDED RELEASE ORAL DAILY
Qty: 60 CAPSULE | Refills: 0 | Status: SHIPPED | OUTPATIENT
Start: 2024-12-16

## 2024-12-16 ASSESSMENT — PAIN SCALES - GENERAL: PAINLEVEL_OUTOF10: NO PAIN (0)

## 2024-12-16 NOTE — PROGRESS NOTES
Assessment & Plan   The longitudinal plan of care for the diagnosis(es)/condition(s) as documented were addressed during this visit. Due to the added complexity in care, I will continue to support Vin in the subsequent management and with ongoing continuity of care.    ADHD (attention deficit hyperactivity disorder), combined type  Patient is doing very well on current dose of medication.  Wishes a refill today.  Refill sent to the pharmacy for her.  Follow-up in 6 months advised.  - amphetamine-dextroamphetamine (ADDERALL XR) 20 MG 24 hr capsule; Take 2 capsules (40 mg) by mouth daily.    Binge eating disorder in remission  VENITA (generalized anxiety disorder)  Ongoing follow-up with specialist as strongly recommended.  Follow-up with me as needed.      Work on weight loss  Regular exercise    Subjective   Vin is a 34 year old, presenting for the following health issues:  A.D.H.D (Follow up /)      12/16/2024     8:35 AM   Additional Questions   Roomed by Salena MCCOY   Accompanied by Self     A.D.H.D    History of Present Illness       Reason for visit:  ADHD Med Follow-up    She eats 2-3 servings of fruits and vegetables daily.She consumes 0 sweetened beverage(s) daily.She exercises with enough effort to increase her heart rate 20 to 29 minutes per day.  She exercises with enough effort to increase her heart rate 3 or less days per week. She is missing 1 dose(s) of medications per week.  She is not taking prescribed medications regularly due to remembering to take and other.     SUBJECTIVE:  Vin is here today to recheck ADHD/ADD.    Updates since last visit: Same     Routine for taking medicine, including time: 4pm  Time medicine wears off: 3-4am   Issues at school/work: None   Issues at home: none   Control of symptoms: Yes     Side effects:  Headaches: No  Stomach aches: No  Irritability/mood swings: No  Difficulties with sleep: No  Social withdrawal: No  Unusual movements/tics: No  Decreased appetite:  "No    Other concerns: None       Review of Systems  Constitutional, HEENT, cardiovascular, pulmonary, GI, , musculoskeletal, neuro, skin, endocrine and psych systems are negative, except as otherwise noted.      Objective    /80   Pulse 99   Temp 96.9  F (36.1  C) (Temporal)   Resp 18   Ht 1.735 m (5' 8.31\")   Wt 84.8 kg (187 lb)   SpO2 98%   BMI 28.18 kg/m    Body mass index is 28.18 kg/m .  Physical Exam   GENERAL: alert and no distress  NECK: no adenopathy, no asymmetry, masses, or scars  RESP: lungs clear to auscultation - no rales, rhonchi or wheezes  CV: regular rate and rhythm, normal S1 S2, no S3 or S4, no murmur, click or rub, no peripheral edema  ABDOMEN: soft, nontender, no hepatosplenomegaly, no masses and bowel sounds normal  MS: no gross musculoskeletal defects noted, no edema    No results found for this or any previous visit (from the past 24 hours).        Signed Electronically by: Juan Riley PA-C    "

## 2025-01-16 DIAGNOSIS — F90.2 ADHD (ATTENTION DEFICIT HYPERACTIVITY DISORDER), COMBINED TYPE: ICD-10-CM

## 2025-01-16 RX ORDER — DEXTROAMPHETAMINE SACCHARATE, AMPHETAMINE ASPARTATE MONOHYDRATE, DEXTROAMPHETAMINE SULFATE AND AMPHETAMINE SULFATE 5; 5; 5; 5 MG/1; MG/1; MG/1; MG/1
40 CAPSULE, EXTENDED RELEASE ORAL DAILY
Qty: 60 CAPSULE | Refills: 0 | Status: SHIPPED | OUTPATIENT
Start: 2025-01-16

## 2025-02-17 DIAGNOSIS — F90.2 ADHD (ATTENTION DEFICIT HYPERACTIVITY DISORDER), COMBINED TYPE: ICD-10-CM

## 2025-02-17 RX ORDER — DEXTROAMPHETAMINE SACCHARATE, AMPHETAMINE ASPARTATE MONOHYDRATE, DEXTROAMPHETAMINE SULFATE AND AMPHETAMINE SULFATE 5; 5; 5; 5 MG/1; MG/1; MG/1; MG/1
40 CAPSULE, EXTENDED RELEASE ORAL DAILY
Qty: 60 CAPSULE | Refills: 0 | Status: SHIPPED | OUTPATIENT
Start: 2025-02-17

## 2025-03-18 DIAGNOSIS — F90.2 ADHD (ATTENTION DEFICIT HYPERACTIVITY DISORDER), COMBINED TYPE: ICD-10-CM

## 2025-03-19 RX ORDER — DEXTROAMPHETAMINE SACCHARATE, AMPHETAMINE ASPARTATE MONOHYDRATE, DEXTROAMPHETAMINE SULFATE AND AMPHETAMINE SULFATE 5; 5; 5; 5 MG/1; MG/1; MG/1; MG/1
40 CAPSULE, EXTENDED RELEASE ORAL DAILY
Qty: 60 CAPSULE | Refills: 0 | Status: SHIPPED | OUTPATIENT
Start: 2025-03-19

## 2025-04-21 DIAGNOSIS — F90.2 ADHD (ATTENTION DEFICIT HYPERACTIVITY DISORDER), COMBINED TYPE: ICD-10-CM

## 2025-04-21 RX ORDER — DEXTROAMPHETAMINE SACCHARATE, AMPHETAMINE ASPARTATE MONOHYDRATE, DEXTROAMPHETAMINE SULFATE AND AMPHETAMINE SULFATE 5; 5; 5; 5 MG/1; MG/1; MG/1; MG/1
40 CAPSULE, EXTENDED RELEASE ORAL DAILY
Qty: 60 CAPSULE | Refills: 0 | Status: SHIPPED | OUTPATIENT
Start: 2025-04-21

## 2025-05-21 DIAGNOSIS — F90.2 ADHD (ATTENTION DEFICIT HYPERACTIVITY DISORDER), COMBINED TYPE: ICD-10-CM

## 2025-05-21 RX ORDER — DEXTROAMPHETAMINE SACCHARATE, AMPHETAMINE ASPARTATE MONOHYDRATE, DEXTROAMPHETAMINE SULFATE AND AMPHETAMINE SULFATE 5; 5; 5; 5 MG/1; MG/1; MG/1; MG/1
40 CAPSULE, EXTENDED RELEASE ORAL DAILY
Qty: 60 CAPSULE | Refills: 0 | Status: SHIPPED | OUTPATIENT
Start: 2025-05-21

## 2025-06-17 ENCOUNTER — OFFICE VISIT (OUTPATIENT)
Dept: FAMILY MEDICINE | Facility: OTHER | Age: 35
End: 2025-06-17
Payer: COMMERCIAL

## 2025-06-17 VITALS
TEMPERATURE: 97.8 F | WEIGHT: 176 LBS | OXYGEN SATURATION: 98 % | SYSTOLIC BLOOD PRESSURE: 102 MMHG | DIASTOLIC BLOOD PRESSURE: 86 MMHG | HEART RATE: 97 BPM | HEIGHT: 68 IN | RESPIRATION RATE: 18 BRPM | BODY MASS INDEX: 26.67 KG/M2

## 2025-06-17 DIAGNOSIS — E78.5 HYPERLIPIDEMIA LDL GOAL <130: ICD-10-CM

## 2025-06-17 DIAGNOSIS — F90.2 ADHD (ATTENTION DEFICIT HYPERACTIVITY DISORDER), COMBINED TYPE: Primary | ICD-10-CM

## 2025-06-17 DIAGNOSIS — Z83.42 FAMILY HISTORY OF FAMILIAL HYPERCHOLESTEROLEMIA: ICD-10-CM

## 2025-06-17 DIAGNOSIS — F41.1 GAD (GENERALIZED ANXIETY DISORDER): ICD-10-CM

## 2025-06-17 PROBLEM — L70.0 ACNE VULGARIS: Status: ACTIVE | Noted: 2018-05-17

## 2025-06-17 PROBLEM — R51.9 INTRACTABLE EPISODIC HEADACHE, UNSPECIFIED HEADACHE TYPE: Status: RESOLVED | Noted: 2018-08-14 | Resolved: 2025-06-17

## 2025-06-17 PROCEDURE — 99213 OFFICE O/P EST LOW 20 MIN: CPT | Performed by: PHYSICIAN ASSISTANT

## 2025-06-17 PROCEDURE — G2211 COMPLEX E/M VISIT ADD ON: HCPCS | Performed by: PHYSICIAN ASSISTANT

## 2025-06-17 PROCEDURE — 1126F AMNT PAIN NOTED NONE PRSNT: CPT | Performed by: PHYSICIAN ASSISTANT

## 2025-06-17 PROCEDURE — 3074F SYST BP LT 130 MM HG: CPT | Performed by: PHYSICIAN ASSISTANT

## 2025-06-17 PROCEDURE — 3079F DIAST BP 80-89 MM HG: CPT | Performed by: PHYSICIAN ASSISTANT

## 2025-06-17 RX ORDER — DEXTROAMPHETAMINE SACCHARATE, AMPHETAMINE ASPARTATE MONOHYDRATE, DEXTROAMPHETAMINE SULFATE AND AMPHETAMINE SULFATE 5; 5; 5; 5 MG/1; MG/1; MG/1; MG/1
40 CAPSULE, EXTENDED RELEASE ORAL DAILY
Qty: 60 CAPSULE | Refills: 0 | Status: SHIPPED | OUTPATIENT
Start: 2025-06-20

## 2025-06-17 ASSESSMENT — PAIN SCALES - GENERAL: PAINLEVEL_OUTOF10: NO PAIN (0)

## 2025-06-17 NOTE — PROGRESS NOTES
"  Assessment & Plan     ADHD (attention deficit hyperactivity disorder), combined type  Refilled medications as requested.  They are working very well.  No new concerns.  Follow-up in 6 months.  Much improved  - amphetamine-dextroamphetamine (ADDERALL XR) 20 MG 24 hr capsule; Take 2 capsules (40 mg) by mouth daily.    VENITA (generalized anxiety disorder)    Much improved with treatment of her ADHD.  Follow-up as needed.    Hyperlipidemia LDL goal <130  Family history of familial hypercholesterolemia  LDL goal less than 130.  She stopped taking medications.  She needs to follow-up with primary care Or with whoever is doing her physicals to make sure her cholesterol is low.  Encouraged full physical in the near future but she is not due for Pap or pelvic exam until 2028.    The longitudinal plan of care for the diagnosis(es)/condition(s) as documented were addressed during this visit. Due to the added complexity in care, I will continue to support Vin in the subsequent management and with ongoing continuity of care.        BMI  Estimated body mass index is 26.52 kg/m  as calculated from the following:    Height as of this encounter: 1.735 m (5' 8.31\").    Weight as of this encounter: 79.8 kg (176 lb).   Weight management plan: Discussed healthy diet and exercise guidelines      Subjective   Vin is a 34 year old, presenting for the following health issues:  Recheck Medication      6/17/2025     7:05 AM   Additional Questions   Roomed by justine     History of Present Illness       Reason for visit:  6 month Rx connection (ADHD)    She eats 2-3 servings of fruits and vegetables daily.She consumes 0 sweetened beverage(s) daily.She exercises with enough effort to increase her heart rate 30 to 60 minutes per day.  She exercises with enough effort to increase her heart rate 4 days per week. She is missing 1 dose(s) of medications per week.  She is not taking prescribed medications regularly due to remembering to " "take.          Review of Systems  Constitutional, HEENT, cardiovascular, pulmonary, GI, , musculoskeletal, neuro, skin, endocrine and psych systems are negative, except as otherwise noted.      Objective    /86 (BP Location: Left arm, Cuff Size: Adult Regular)   Pulse 97   Temp 97.8  F (36.6  C) (Temporal)   Resp 18   Ht 1.735 m (5' 8.31\")   Wt 79.8 kg (176 lb)   SpO2 98%   BMI 26.52 kg/m    Body mass index is 26.52 kg/m .  Physical Exam   GENERAL: alert and no distress  RESP: lungs clear to auscultation - no rales, rhonchi or wheezes  CV: regular rate and rhythm, normal S1 S2, no S3 or S4, no murmur, click or rub, no peripheral edema  ABDOMEN: soft, nontender, no hepatosplenomegaly, no masses and bowel sounds normal  MS: no gross musculoskeletal defects noted, no edema  NEURO:  DTRs are normal and symmetric throughout.  Cranial nerves 2-12 grossly intact.  Sensation intact to light touch.  Psychological:   Negative for memory, mood or flight of ideas at this time with appropriate affect.  Good recall of date, time and place.    No results found for this or any previous visit (from the past 24 hours).        Signed Electronically by: Juan Riley PA-C    "

## 2025-07-28 ENCOUNTER — PATIENT OUTREACH (OUTPATIENT)
Dept: CARE COORDINATION | Facility: CLINIC | Age: 35
End: 2025-07-28
Payer: COMMERCIAL

## 2025-08-11 ENCOUNTER — PATIENT OUTREACH (OUTPATIENT)
Dept: CARE COORDINATION | Facility: CLINIC | Age: 35
End: 2025-08-11
Payer: COMMERCIAL

## 2025-08-25 ENCOUNTER — OFFICE VISIT (OUTPATIENT)
Dept: OBGYN | Facility: CLINIC | Age: 35
End: 2025-08-25
Payer: COMMERCIAL

## 2025-08-25 VITALS
WEIGHT: 173.6 LBS | HEIGHT: 68 IN | SYSTOLIC BLOOD PRESSURE: 112 MMHG | DIASTOLIC BLOOD PRESSURE: 76 MMHG | BODY MASS INDEX: 26.31 KG/M2

## 2025-08-25 DIAGNOSIS — Z01.419 WELL FEMALE EXAM WITH ROUTINE GYNECOLOGICAL EXAM: Primary | ICD-10-CM

## 2025-08-25 PROCEDURE — 99395 PREV VISIT EST AGE 18-39: CPT | Performed by: OBSTETRICS & GYNECOLOGY

## 2025-08-25 PROCEDURE — 3074F SYST BP LT 130 MM HG: CPT | Performed by: OBSTETRICS & GYNECOLOGY

## 2025-08-25 PROCEDURE — 3078F DIAST BP <80 MM HG: CPT | Performed by: OBSTETRICS & GYNECOLOGY
